# Patient Record
Sex: FEMALE | Race: WHITE | NOT HISPANIC OR LATINO | URBAN - METROPOLITAN AREA
[De-identification: names, ages, dates, MRNs, and addresses within clinical notes are randomized per-mention and may not be internally consistent; named-entity substitution may affect disease eponyms.]

---

## 2017-01-09 ENCOUNTER — OUTPATIENT (OUTPATIENT)
Dept: OUTPATIENT SERVICES | Facility: HOSPITAL | Age: 57
LOS: 1 days | End: 2017-01-09
Payer: COMMERCIAL

## 2017-01-09 ENCOUNTER — APPOINTMENT (OUTPATIENT)
Dept: CT IMAGING | Facility: IMAGING CENTER | Age: 57
End: 2017-01-09

## 2017-01-09 DIAGNOSIS — Z00.8 ENCOUNTER FOR OTHER GENERAL EXAMINATION: ICD-10-CM

## 2017-01-09 PROCEDURE — G0297: CPT

## 2018-02-14 ENCOUNTER — RESULT REVIEW (OUTPATIENT)
Age: 58
End: 2018-02-14

## 2018-03-05 ENCOUNTER — OUTPATIENT (OUTPATIENT)
Dept: OUTPATIENT SERVICES | Facility: HOSPITAL | Age: 58
LOS: 1 days | End: 2018-03-05
Payer: COMMERCIAL

## 2018-03-05 ENCOUNTER — APPOINTMENT (OUTPATIENT)
Dept: CT IMAGING | Facility: IMAGING CENTER | Age: 58
End: 2018-03-05
Payer: COMMERCIAL

## 2018-03-05 DIAGNOSIS — Z13.9 ENCOUNTER FOR SCREENING, UNSPECIFIED: ICD-10-CM

## 2018-03-05 PROCEDURE — G0297: CPT

## 2018-03-05 PROCEDURE — G0297: CPT | Mod: 26

## 2018-11-20 ENCOUNTER — APPOINTMENT (OUTPATIENT)
Dept: RADIOLOGY | Facility: IMAGING CENTER | Age: 58
End: 2018-11-20
Payer: COMMERCIAL

## 2018-11-20 ENCOUNTER — OUTPATIENT (OUTPATIENT)
Dept: OUTPATIENT SERVICES | Facility: HOSPITAL | Age: 58
LOS: 1 days | End: 2018-11-20
Payer: COMMERCIAL

## 2018-11-20 DIAGNOSIS — R05 COUGH: ICD-10-CM

## 2018-11-20 PROCEDURE — 71046 X-RAY EXAM CHEST 2 VIEWS: CPT | Mod: 26

## 2018-11-20 PROCEDURE — 71046 X-RAY EXAM CHEST 2 VIEWS: CPT

## 2019-03-22 ENCOUNTER — APPOINTMENT (OUTPATIENT)
Dept: CT IMAGING | Facility: IMAGING CENTER | Age: 59
End: 2019-03-22

## 2019-03-22 ENCOUNTER — OUTPATIENT (OUTPATIENT)
Dept: OUTPATIENT SERVICES | Facility: HOSPITAL | Age: 59
LOS: 1 days | End: 2019-03-22
Payer: COMMERCIAL

## 2019-03-22 DIAGNOSIS — Z00.8 ENCOUNTER FOR OTHER GENERAL EXAMINATION: ICD-10-CM

## 2019-03-22 PROCEDURE — G0297: CPT | Mod: 26

## 2019-03-22 PROCEDURE — G0297: CPT

## 2020-09-24 VITALS — BODY MASS INDEX: 19.49 KG/M2 | WEIGHT: 110 LBS | HEIGHT: 63 IN

## 2020-09-24 DIAGNOSIS — Z87.09 PERSONAL HISTORY OF OTHER DISEASES OF THE RESPIRATORY SYSTEM: ICD-10-CM

## 2020-09-24 DIAGNOSIS — Z12.2 ENCOUNTER FOR SCREENING FOR MALIGNANT NEOPLASM OF RESPIRATORY ORGANS: ICD-10-CM

## 2020-09-24 NOTE — ASSESSMENT
[Discussed Risks and Advised to Quit Smoking] : Discussed risks and advised to quit smoking [Discussed Cessation Strategies] : cessation strategies were discussed [Not Ready] : Patient is not ready for cessation intervention [Pre-contemplation] : Pre-contemplation: The patient is not thinking about quitting smoking in the next 6 months

## 2020-09-24 NOTE — HISTORY OF PRESENT ILLNESS
[Current] : current smoker [_____ pack-years] : [unfilled] pack-years [TextBox_13] : She denies hemoptysis, denies new cough, denies unexplained weight loss.  She is a current smoker with a 41 pack year smoking history (1PPD x 41 years). She denies family h/o lung cancer.  She is referred by Dr. Freedom Fallon.  This is a telephonic visit. \par \par

## 2020-09-24 NOTE — PLAN
[Smoking Cessation Guidance Provided] : Smoking cessation guidance was provided to patient [Smoking Cessation] : smoking cessation [Age appropriate screenings] : Age appropriate screenings [Regular Exercise] : regular exercise [Regular follow-up with healthcare provider] : regular follow-up with healthcare provider [FreeTextEntry1] : Her LDCT is scheduled for 9/29/20 at the [facility] location.  She will follow up with Dr. Fallon for the resutls.

## 2020-09-29 ENCOUNTER — APPOINTMENT (OUTPATIENT)
Dept: CT IMAGING | Facility: IMAGING CENTER | Age: 60
End: 2020-09-29
Payer: COMMERCIAL

## 2020-09-29 ENCOUNTER — OUTPATIENT (OUTPATIENT)
Dept: OUTPATIENT SERVICES | Facility: HOSPITAL | Age: 60
LOS: 1 days | End: 2020-09-29
Payer: COMMERCIAL

## 2020-09-29 DIAGNOSIS — Z00.8 ENCOUNTER FOR OTHER GENERAL EXAMINATION: ICD-10-CM

## 2020-09-29 PROCEDURE — G0297: CPT | Mod: 26

## 2020-09-29 PROCEDURE — G0297: CPT

## 2020-10-27 ENCOUNTER — APPOINTMENT (OUTPATIENT)
Dept: THORACIC SURGERY | Facility: CLINIC | Age: 60
End: 2020-10-27
Payer: COMMERCIAL

## 2020-10-27 VITALS
WEIGHT: 112 LBS | DIASTOLIC BLOOD PRESSURE: 78 MMHG | OXYGEN SATURATION: 97 % | BODY MASS INDEX: 19.84 KG/M2 | HEART RATE: 100 BPM | SYSTOLIC BLOOD PRESSURE: 118 MMHG | HEIGHT: 63 IN

## 2020-10-27 DIAGNOSIS — F17.200 NICOTINE DEPENDENCE, UNSPECIFIED, UNCOMPLICATED: ICD-10-CM

## 2020-10-27 DIAGNOSIS — Z82.5 FAMILY HISTORY OF ASTHMA AND OTHER CHRONIC LOWER RESPIRATORY DISEASES: ICD-10-CM

## 2020-10-27 DIAGNOSIS — Z78.9 OTHER SPECIFIED HEALTH STATUS: ICD-10-CM

## 2020-10-27 PROCEDURE — 99205 OFFICE O/P NEW HI 60 MIN: CPT

## 2020-10-27 PROCEDURE — 99072 ADDL SUPL MATRL&STAF TM PHE: CPT

## 2020-10-27 RX ORDER — ERGOCALCIFEROL (VITAMIN D2) 1250 MCG
50000 CAPSULE ORAL
Refills: 0 | Status: ACTIVE | COMMUNITY

## 2020-10-27 RX ORDER — FLUTICASONE FUROATE, UMECLIDINIUM BROMIDE AND VILANTEROL TRIFENATATE 100; 62.5; 25 UG/1; UG/1; UG/1
100-62.5-25 POWDER RESPIRATORY (INHALATION)
Refills: 0 | Status: ACTIVE | COMMUNITY

## 2020-10-27 RX ORDER — ALBUTEROL SULFATE
POWDER (GRAM) MISCELLANEOUS
Refills: 0 | Status: ACTIVE | COMMUNITY

## 2020-10-27 RX ORDER — MULTIVITAMIN/IRON/FOLIC ACID 18MG-0.4MG
TABLET ORAL
Refills: 0 | Status: ACTIVE | COMMUNITY

## 2020-10-28 NOTE — HISTORY OF PRESENT ILLNESS
[FreeTextEntry1] : Ms. AVIVA VALENCIA, 60 year old female, current smoker (1/2 PPD x 30 years; Quit 1 week ago), w/ hx of COPD, who presented to PCP for low dose CT lung cancer screening and was found to have new lung nodule during the LDCT this year. \par \par CT chest on 09/29/2020:\par - New 3 x 2 cm lobulated mass with surrounding tree-in-bud micronodules within the superior segment of LLL \par - Centrilobular and paraseptal emphysema \par \par Patient is here today for CT surgery consultation, referred by Dr. Freedom Fallon (Pulm). Today, patient admits to AM cough, expectorating small amount of clear sputum; Denies SOB, chest pain,hemoptysis, dyspnea upon exertion, fever, chills, night sweats, lightheadedness or dizziness.\par

## 2020-10-28 NOTE — PHYSICAL EXAM
[General Appearance - Alert] : alert [General Appearance - In No Acute Distress] : in no acute distress [Sclera] : the sclera and conjunctiva were normal [PERRL With Normal Accommodation] : pupils were equal in size, round, and reactive to light [Extraocular Movements] : extraocular movements were intact [Outer Ear] : the ears and nose were normal in appearance [Hearing Threshold Finger Rub Not Butler] : hearing was normal [Oropharynx] : the oropharynx was normal [Neck Appearance] : the appearance of the neck was normal [Neck Cervical Mass (___cm)] : no neck mass was observed [Jugular Venous Distention Increased] : there was no jugular-venous distention [Thyroid Diffuse Enlargement] : the thyroid was not enlarged [Thyroid Nodule] : there were no palpable thyroid nodules [Respiration, Rhythm And Depth] : normal respiratory rhythm and effort [Exaggerated Use Of Accessory Muscles For Inspiration] : no accessory muscle use [Auscultation Breath Sounds / Voice Sounds] : lungs were clear to auscultation bilaterally [Apical Impulse] : the apical impulse was normal [Heart Rate And Rhythm] : heart rate was normal and rhythm regular [Heart Sounds] : normal S1 and S2 [Examination Of The Chest] : the chest was normal in appearance [Chest Visual Inspection Thoracic Asymmetry] : no chest asymmetry [Diminished Respiratory Excursion] : normal chest expansion [2+] : left 2+ [No Pulse Delay] : no pulse delay [No Pulse Discrepancy] : no pulse discrepancy detected [Full Pulse] : peripheral pulses were full [Breast Appearance] : normal in appearance [Breast Palpation Mass] : no palpable masses [Bowel Sounds] : normal bowel sounds [Abdomen Soft] : soft [Abdomen Tenderness] : non-tender [Cervical Lymph Nodes Enlarged Posterior Bilaterally] : posterior cervical [Cervical Lymph Nodes Enlarged Anterior Bilaterally] : anterior cervical [Supraclavicular Lymph Nodes Enlarged Bilaterally] : supraclavicular [No CVA Tenderness] : no ~M costovertebral angle tenderness [No Spinal Tenderness] : no spinal tenderness [Abnormal Walk] : normal gait [Nail Clubbing] : no clubbing  or cyanosis of the fingernails [Musculoskeletal - Swelling] : no joint swelling seen [Motor Tone] : muscle strength and tone were normal [Skin Color & Pigmentation] : normal skin color and pigmentation [Skin Turgor] : normal skin turgor [] : no rash [Skin Lesions] : no skin lesions [Deep Tendon Reflexes (DTR)] : deep tendon reflexes were 2+ and symmetric [Sensation] : the sensory exam was normal to light touch and pinprick [Motor Exam] : the motor exam was normal [No Focal Deficits] : no focal deficits [Oriented To Time, Place, And Person] : oriented to person, place, and time [Impaired Insight] : insight and judgment were intact [Affect] : the affect was normal [Mood] : the mood was normal [Memory Recent] : recent memory was not impaired [Memory Remote] : remote memory was not impaired [Fingers] :  capillary refill of the fingers was normal [FreeTextEntry1] : Deferred

## 2020-10-28 NOTE — CONSULT LETTER
[Dear  ___] : Dear  [unfilled], [Consult Letter:] : I had the pleasure of evaluating your patient, [unfilled]. [( Thank you for referring [unfilled] for consultation for _____ )] : Thank you for referring [unfilled] for consultation for [unfilled] [Please see my note below.] : Please see my note below. [Consult Closing:] : Thank you very much for allowing me to participate in the care of this patient.  If you have any questions, please do not hesitate to contact me. [Sincerely,] : Sincerely, [FreeTextEntry2] : Dr. Freedom Fallon (Pulm/Ref) [FreeTextEntry3] : Arden Zuleta MD, FACS \par Chief, Division of Thoracic Surgery \par Director, Minimally Invasive Thoracic Surgery \par Department of Cardiovascular and Thoracic Surgery \par [facility] \par , Cardiovascular and Thoracic Surgery\par \par

## 2020-10-28 NOTE — ASSESSMENT
[FreeTextEntry1] : Ms. AVIVA VALENCIA, 60 year old female, current smoker (1/2 PPD x 30 years; Quit 1 week ago), w/ hx of COPD, who presented to PCP for low dose CT lung cancer screening and was found to have new lung nodule during the LDCT this year. \par \par CT chest on 09/29/2020:\par - New 3 x 2 cm lobulated mass with surrounding tree-in-bud micronodules within the superior segment of LLL \par - Centrilobular and paraseptal emphysema \par \par I have reviewed CT chest with patient and have recommended that the patient undergo a Lt VATS, lung resection. Risks and benefits and alternatives explained to patient, all questions answered, patient agreed to proceed with surgery. \par 2. Prior to surgery, I have instructed patient to undergo a PET/CT to evaluate for the presence of metabolically active disease; A Brain MRI with/without contrast to evaluate for the presence of metastasis; Pulmonary function testing; and medical clearance. \par 3. Patient meeting criteria for Ethicon study. Research coordinator present to discuss risks/benefits and patient verbalizes understanding. Patient agreeing to participate.ASA 3. \par \par \par I personally performed the services described in the documentation, reviewed the documentation recorded by the scribe in my presence and it accurately and completely records my words and actions.\par \par I, Odalys Zimmerman RN, am scribing for and the presence of EDDIE LehmanIM, the following sections HISTORY OF PRESENT ILLNESS, PAST MEDICAL/FAMILY/SOCIAL HISTORY; REVIEW OF SYSTEMS; VITAL SIGNS; PHYSICAL EXAM; DISPOSITION.\par \par

## 2020-10-28 NOTE — DATA REVIEWED
[FreeTextEntry1] : CT chest on 09/29/2020:\par - New 3 x 2 cm lobulated mass with surrounding tree-in-bud micronodules within the superior segment of LLL \par - Centrilobular and paraseptal emphysema \par \par

## 2020-11-10 ENCOUNTER — APPOINTMENT (OUTPATIENT)
Dept: MRI IMAGING | Facility: CLINIC | Age: 60
End: 2020-11-10
Payer: COMMERCIAL

## 2020-11-10 ENCOUNTER — OUTPATIENT (OUTPATIENT)
Dept: OUTPATIENT SERVICES | Facility: HOSPITAL | Age: 60
LOS: 1 days | End: 2020-11-10
Payer: COMMERCIAL

## 2020-11-10 DIAGNOSIS — R91.8 OTHER NONSPECIFIC ABNORMAL FINDING OF LUNG FIELD: ICD-10-CM

## 2020-11-10 DIAGNOSIS — Z00.8 ENCOUNTER FOR OTHER GENERAL EXAMINATION: ICD-10-CM

## 2020-11-10 PROCEDURE — 70553 MRI BRAIN STEM W/O & W/DYE: CPT

## 2020-11-10 PROCEDURE — 70553 MRI BRAIN STEM W/O & W/DYE: CPT | Mod: 26

## 2020-11-10 PROCEDURE — A9585: CPT

## 2020-11-12 ENCOUNTER — APPOINTMENT (OUTPATIENT)
Dept: NUCLEAR MEDICINE | Facility: IMAGING CENTER | Age: 60
End: 2020-11-12
Payer: COMMERCIAL

## 2020-11-12 ENCOUNTER — OUTPATIENT (OUTPATIENT)
Dept: OUTPATIENT SERVICES | Facility: HOSPITAL | Age: 60
LOS: 1 days | End: 2020-11-12
Payer: COMMERCIAL

## 2020-11-12 DIAGNOSIS — R91.8 OTHER NONSPECIFIC ABNORMAL FINDING OF LUNG FIELD: ICD-10-CM

## 2020-11-12 DIAGNOSIS — Z00.8 ENCOUNTER FOR OTHER GENERAL EXAMINATION: ICD-10-CM

## 2020-11-12 PROCEDURE — 78815 PET IMAGE W/CT SKULL-THIGH: CPT | Mod: 26,PI

## 2020-11-12 PROCEDURE — A9552: CPT

## 2020-11-12 PROCEDURE — 78815 PET IMAGE W/CT SKULL-THIGH: CPT

## 2020-11-13 ENCOUNTER — OUTPATIENT (OUTPATIENT)
Dept: OUTPATIENT SERVICES | Facility: HOSPITAL | Age: 60
LOS: 1 days | End: 2020-11-13

## 2020-11-13 VITALS
RESPIRATION RATE: 20 BRPM | WEIGHT: 113.1 LBS | TEMPERATURE: 98 F | SYSTOLIC BLOOD PRESSURE: 120 MMHG | DIASTOLIC BLOOD PRESSURE: 70 MMHG | OXYGEN SATURATION: 99 % | HEIGHT: 62 IN | HEART RATE: 96 BPM

## 2020-11-13 DIAGNOSIS — R91.8 OTHER NONSPECIFIC ABNORMAL FINDING OF LUNG FIELD: ICD-10-CM

## 2020-11-13 DIAGNOSIS — J44.9 CHRONIC OBSTRUCTIVE PULMONARY DISEASE, UNSPECIFIED: ICD-10-CM

## 2020-11-13 DIAGNOSIS — Z98.890 OTHER SPECIFIED POSTPROCEDURAL STATES: Chronic | ICD-10-CM

## 2020-11-13 DIAGNOSIS — Z90.89 ACQUIRED ABSENCE OF OTHER ORGANS: Chronic | ICD-10-CM

## 2020-11-13 LAB
ANION GAP SERPL CALC-SCNC: 11 MMO/L — SIGNIFICANT CHANGE UP (ref 7–14)
BLD GP AB SCN SERPL QL: NEGATIVE — SIGNIFICANT CHANGE UP
BUN SERPL-MCNC: 10 MG/DL — SIGNIFICANT CHANGE UP (ref 7–23)
CALCIUM SERPL-MCNC: 9.5 MG/DL — SIGNIFICANT CHANGE UP (ref 8.4–10.5)
CHLORIDE SERPL-SCNC: 101 MMOL/L — SIGNIFICANT CHANGE UP (ref 98–107)
CO2 SERPL-SCNC: 27 MMOL/L — SIGNIFICANT CHANGE UP (ref 22–31)
CREAT SERPL-MCNC: 0.58 MG/DL — SIGNIFICANT CHANGE UP (ref 0.5–1.3)
GLUCOSE SERPL-MCNC: 97 MG/DL — SIGNIFICANT CHANGE UP (ref 70–99)
POTASSIUM SERPL-MCNC: 3.6 MMOL/L — SIGNIFICANT CHANGE UP (ref 3.5–5.3)
POTASSIUM SERPL-SCNC: 3.6 MMOL/L — SIGNIFICANT CHANGE UP (ref 3.5–5.3)
RH IG SCN BLD-IMP: POSITIVE — SIGNIFICANT CHANGE UP
SODIUM SERPL-SCNC: 139 MMOL/L — SIGNIFICANT CHANGE UP (ref 135–145)

## 2020-11-13 RX ORDER — SODIUM CHLORIDE 9 MG/ML
1000 INJECTION, SOLUTION INTRAVENOUS
Refills: 0 | Status: DISCONTINUED | OUTPATIENT
Start: 2020-11-20 | End: 2020-11-22

## 2020-11-13 NOTE — H&P PST ADULT - NEGATIVE ENMT SYMPTOMS
no ear pain/no hearing difficulty/no vertigo/no nasal congestion/no sinus symptoms/no tinnitus/no throat pain/no dysphagia

## 2020-11-13 NOTE — H&P PST ADULT - RS GEN PE MLT RESP DETAILS PC
breath sounds equal/respirations non-labored/clear to auscultation bilaterally/airway patent/no wheezes/good air movement

## 2020-11-13 NOTE — H&P PST ADULT - NSICDXPROBLEM_GEN_ALL_CORE_FT
PROBLEM DIAGNOSES  Problem: Other nonspecific abnormal finding of lung field  Assessment and Plan: Scheduled for flexible bronchoscopy, left video assisted thoracoscopic surgery, lung resection with Dr. Zuleta.   Verbal and written pre-op instructions provided to patient. Patient verbalized understanding and will call surgeons office for revised instructions if surgery is rescheduled.   Pepcid for GI prophylaxis provided.   patient given verbal and written instruction with teach back on chlorhexidine shampoo, and the patient verbalized understanding with return demonstration.   Patient aware of need for COVID testing prior to  procedure and advised to coordinate with surgeon.   Pending Cardiac eval as per surgeon request-copy requested.   Patient will obtain medical clearance as per surgeons request-copy requested.       Problem: COPD (chronic obstructive pulmonary disease)  Assessment and Plan: Pt. instructed to continue medications as prescribed.

## 2020-11-13 NOTE — H&P PST ADULT - ASSESSMENT
59 yo 30-pack year smoker with history of COPD presents to PST unit with other nonspecific abnormal finding of lung field scheduled for flexible bronchoscopy, left video assisted thoracoscopic surgery, lung resection with Dr. Zuleta.

## 2020-11-13 NOTE — H&P PST ADULT - HISTORY OF PRESENT ILLNESS
61 yo 30-pack year smoker with history of COPD presents to PST unit with other nonspecific abnormal finding of lung field scheduled for flexible bronchoscopy, left video assisted thoracoscopic surgery, lung resection with Dr. Zuleta. She reports abnormal ling finding on annual CT scan done as surveillance due to her smoking history.

## 2020-11-13 NOTE — H&P PST ADULT - NSICDXPASTMEDICALHX_GEN_ALL_CORE_FT
PAST MEDICAL HISTORY:  History of COPD     Osteoporosis     Other nonspecific abnormal finding of lung field

## 2020-11-13 NOTE — H&P PST ADULT - NSANTHOSAYNRD_GEN_A_CORE
No. PANCHO screening performed.  STOP BANG Legend: 0-2 = LOW Risk; 3-4 = INTERMEDIATE Risk; 5-8 = HIGH Risk

## 2020-11-16 DIAGNOSIS — Z01.818 ENCOUNTER FOR OTHER PREPROCEDURAL EXAMINATION: ICD-10-CM

## 2020-11-17 ENCOUNTER — APPOINTMENT (OUTPATIENT)
Dept: DISASTER EMERGENCY | Facility: CLINIC | Age: 60
End: 2020-11-17

## 2020-11-18 LAB — SARS-COV-2 N GENE NPH QL NAA+PROBE: NOT DETECTED

## 2020-11-19 ENCOUNTER — TRANSCRIPTION ENCOUNTER (OUTPATIENT)
Age: 60
End: 2020-11-19

## 2020-11-20 ENCOUNTER — INPATIENT (INPATIENT)
Facility: HOSPITAL | Age: 60
LOS: 2 days | Discharge: HOME | End: 2020-11-23
Attending: THORACIC SURGERY (CARDIOTHORACIC VASCULAR SURGERY) | Admitting: THORACIC SURGERY (CARDIOTHORACIC VASCULAR SURGERY)
Payer: COMMERCIAL

## 2020-11-20 ENCOUNTER — RESULT REVIEW (OUTPATIENT)
Age: 60
End: 2020-11-20

## 2020-11-20 ENCOUNTER — APPOINTMENT (OUTPATIENT)
Dept: THORACIC SURGERY | Facility: HOSPITAL | Age: 60
End: 2020-11-20

## 2020-11-20 VITALS
HEART RATE: 90 BPM | WEIGHT: 113.1 LBS | TEMPERATURE: 98 F | DIASTOLIC BLOOD PRESSURE: 57 MMHG | HEIGHT: 62 IN | OXYGEN SATURATION: 100 % | SYSTOLIC BLOOD PRESSURE: 88 MMHG | RESPIRATION RATE: 17 BRPM

## 2020-11-20 DIAGNOSIS — Z90.89 ACQUIRED ABSENCE OF OTHER ORGANS: Chronic | ICD-10-CM

## 2020-11-20 DIAGNOSIS — Z98.890 OTHER SPECIFIED POSTPROCEDURAL STATES: Chronic | ICD-10-CM

## 2020-11-20 DIAGNOSIS — R91.8 OTHER NONSPECIFIC ABNORMAL FINDING OF LUNG FIELD: ICD-10-CM

## 2020-11-20 LAB — RH IG SCN BLD-IMP: POSITIVE — SIGNIFICANT CHANGE UP

## 2020-11-20 PROCEDURE — 88305 TISSUE EXAM BY PATHOLOGIST: CPT | Mod: 26

## 2020-11-20 PROCEDURE — 88341 IMHCHEM/IMCYTCHM EA ADD ANTB: CPT | Mod: 26

## 2020-11-20 PROCEDURE — 88313 SPECIAL STAINS GROUP 2: CPT | Mod: 26

## 2020-11-20 PROCEDURE — 88342 IMHCHEM/IMCYTCHM 1ST ANTB: CPT | Mod: 26

## 2020-11-20 PROCEDURE — 88331 PATH CONSLTJ SURG 1 BLK 1SPC: CPT | Mod: 26

## 2020-11-20 PROCEDURE — 99233 SBSQ HOSP IP/OBS HIGH 50: CPT

## 2020-11-20 PROCEDURE — 71045 X-RAY EXAM CHEST 1 VIEW: CPT | Mod: 26,77

## 2020-11-20 PROCEDURE — 88307 TISSUE EXAM BY PATHOLOGIST: CPT | Mod: 26

## 2020-11-20 PROCEDURE — 88309 TISSUE EXAM BY PATHOLOGIST: CPT | Mod: 26

## 2020-11-20 PROCEDURE — 32663 THORACOSCOPY W/LOBECTOMY: CPT | Mod: LT

## 2020-11-20 PROCEDURE — 88334 PATH CONSLTJ SURG CYTO XM EA: CPT | Mod: 26,59

## 2020-11-20 PROCEDURE — 32674 THORACOSCOPY LYMPH NODE EXC: CPT

## 2020-11-20 PROCEDURE — 71045 X-RAY EXAM CHEST 1 VIEW: CPT | Mod: 26

## 2020-11-20 RX ORDER — HYDROMORPHONE HYDROCHLORIDE 2 MG/ML
30 INJECTION INTRAMUSCULAR; INTRAVENOUS; SUBCUTANEOUS
Refills: 0 | Status: DISCONTINUED | OUTPATIENT
Start: 2020-11-20 | End: 2020-11-21

## 2020-11-20 RX ORDER — SENNA PLUS 8.6 MG/1
2 TABLET ORAL AT BEDTIME
Refills: 0 | Status: DISCONTINUED | OUTPATIENT
Start: 2020-11-20 | End: 2020-11-23

## 2020-11-20 RX ORDER — ACETAMINOPHEN 500 MG
975 TABLET ORAL ONCE
Refills: 0 | Status: COMPLETED | OUTPATIENT
Start: 2020-11-20 | End: 2020-11-20

## 2020-11-20 RX ORDER — ALBUTEROL 90 UG/1
0 AEROSOL, METERED ORAL
Qty: 0 | Refills: 0 | DISCHARGE

## 2020-11-20 RX ORDER — HEPARIN SODIUM 5000 [USP'U]/ML
5000 INJECTION INTRAVENOUS; SUBCUTANEOUS ONCE
Refills: 0 | Status: COMPLETED | OUTPATIENT
Start: 2020-11-20 | End: 2020-11-20

## 2020-11-20 RX ORDER — NICOTINE POLACRILEX 2 MG
0 GUM BUCCAL
Qty: 0 | Refills: 0 | DISCHARGE

## 2020-11-20 RX ORDER — GABAPENTIN 400 MG/1
300 CAPSULE ORAL ONCE
Refills: 0 | Status: COMPLETED | OUTPATIENT
Start: 2020-11-20 | End: 2020-11-20

## 2020-11-20 RX ORDER — ONDANSETRON 8 MG/1
4 TABLET, FILM COATED ORAL EVERY 6 HOURS
Refills: 0 | Status: DISCONTINUED | OUTPATIENT
Start: 2020-11-20 | End: 2020-11-21

## 2020-11-20 RX ORDER — ALBUTEROL 90 UG/1
1 AEROSOL, METERED ORAL EVERY 4 HOURS
Refills: 0 | Status: DISCONTINUED | OUTPATIENT
Start: 2020-11-20 | End: 2020-11-20

## 2020-11-20 RX ORDER — HEPARIN SODIUM 5000 [USP'U]/ML
5000 INJECTION INTRAVENOUS; SUBCUTANEOUS EVERY 8 HOURS
Refills: 0 | Status: DISCONTINUED | OUTPATIENT
Start: 2020-11-20 | End: 2020-11-23

## 2020-11-20 RX ORDER — CHOLECALCIFEROL (VITAMIN D3) 125 MCG
0 CAPSULE ORAL
Qty: 0 | Refills: 0 | DISCHARGE

## 2020-11-20 RX ORDER — MULTIVIT-MIN/FERROUS GLUCONATE 9 MG/15 ML
1 LIQUID (ML) ORAL
Qty: 0 | Refills: 0 | DISCHARGE

## 2020-11-20 RX ORDER — ALBUTEROL 90 UG/1
2.5 AEROSOL, METERED ORAL EVERY 6 HOURS
Refills: 0 | Status: DISCONTINUED | OUTPATIENT
Start: 2020-11-20 | End: 2020-11-21

## 2020-11-20 RX ORDER — NALOXONE HYDROCHLORIDE 4 MG/.1ML
0.1 SPRAY NASAL
Refills: 0 | Status: DISCONTINUED | OUTPATIENT
Start: 2020-11-20 | End: 2020-11-21

## 2020-11-20 RX ORDER — ALBUTEROL 90 UG/1
4 AEROSOL, METERED ORAL EVERY 6 HOURS
Refills: 0 | Status: DISCONTINUED | OUTPATIENT
Start: 2020-11-20 | End: 2020-11-21

## 2020-11-20 RX ORDER — SODIUM CHLORIDE 9 MG/ML
250 INJECTION, SOLUTION INTRAVENOUS ONCE
Refills: 0 | Status: COMPLETED | OUTPATIENT
Start: 2020-11-20 | End: 2020-11-20

## 2020-11-20 RX ORDER — HYDROMORPHONE HYDROCHLORIDE 2 MG/ML
0.5 INJECTION INTRAMUSCULAR; INTRAVENOUS; SUBCUTANEOUS
Refills: 0 | Status: DISCONTINUED | OUTPATIENT
Start: 2020-11-20 | End: 2020-11-21

## 2020-11-20 RX ADMIN — HYDROMORPHONE HYDROCHLORIDE 30 MILLILITER(S): 2 INJECTION INTRAMUSCULAR; INTRAVENOUS; SUBCUTANEOUS at 10:36

## 2020-11-20 RX ADMIN — Medication 975 MILLIGRAM(S): at 07:34

## 2020-11-20 RX ADMIN — SODIUM CHLORIDE 1000 MILLILITER(S): 9 INJECTION, SOLUTION INTRAVENOUS at 23:00

## 2020-11-20 RX ADMIN — SODIUM CHLORIDE 30 MILLILITER(S): 9 INJECTION, SOLUTION INTRAVENOUS at 10:35

## 2020-11-20 RX ADMIN — Medication 5 MILLIGRAM(S): at 17:46

## 2020-11-20 RX ADMIN — Medication 975 MILLIGRAM(S): at 07:38

## 2020-11-20 RX ADMIN — HEPARIN SODIUM 5000 UNIT(S): 5000 INJECTION INTRAVENOUS; SUBCUTANEOUS at 07:33

## 2020-11-20 RX ADMIN — HYDROMORPHONE HYDROCHLORIDE 0.5 MILLIGRAM(S): 2 INJECTION INTRAMUSCULAR; INTRAVENOUS; SUBCUTANEOUS at 21:48

## 2020-11-20 RX ADMIN — GABAPENTIN 300 MILLIGRAM(S): 400 CAPSULE ORAL at 07:33

## 2020-11-20 RX ADMIN — HEPARIN SODIUM 5000 UNIT(S): 5000 INJECTION INTRAVENOUS; SUBCUTANEOUS at 21:36

## 2020-11-20 RX ADMIN — HEPARIN SODIUM 5000 UNIT(S): 5000 INJECTION INTRAVENOUS; SUBCUTANEOUS at 16:07

## 2020-11-20 NOTE — BRIEF OPERATIVE NOTE - OPERATION/FINDINGS
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  Uniportal left lower lobectomy, left upper lobe wedge and mediastinal lymph node dissection.

## 2020-11-20 NOTE — PROGRESS NOTE ADULT - SUBJECTIVE AND OBJECTIVE BOX
AVIVA VALENCIA                     [id number]    HPI:  59 yo 30-pack year smoker with history of COPD presents to PST unit with other nonspecific abnormal finding of lung field scheduled for flexible bronchoscopy, left video assisted thoracoscopic surgery, lung resection with Dr. Zuleta. She reports abnormal ling finding on annual CT scan done as surveillance due to her smoking history.  (13 Nov 2020 14:23)      Procedure:   Thoracoscopic lobectomy of left lung 20-Nov-2020 Uniportal left lower lobectomy   Flexible bronchoscopy       Issues:  Lung nodule  Post op pain  COPD        PAST MEDICAL & SURGICAL HISTORY:  Other nonspecific abnormal finding of lung field    History of COPD    Osteoporosis    History of tonsillectomy    H/O ovarian cystectomy              VITAL SIGNS:  Vital Signs Last 24 Hrs  T(C): 36.4 (20 Nov 2020 16:00), Max: 36.7 (20 Nov 2020 07:18)  T(F): 97.6 (20 Nov 2020 16:00), Max: 98.1 (20 Nov 2020 12:00)  HR: 99 (20 Nov 2020 16:00) (82 - 100)  BP: 100/54 (20 Nov 2020 16:00) (88/57 - 110/56)  BP(mean): 65 (20 Nov 2020 16:00) (61 - 86)  RR: 20 (20 Nov 2020 16:00) (13 - 20)  SpO2: 94% (20 Nov 2020 16:00) (94% - 100%)    I/Os:   I&O's Detail    20 Nov 2020 07:01  -  20 Nov 2020 16:13  --------------------------------------------------------  IN:    Lactated Ringers: 210 mL    Oral Fluid: 240 mL  Total IN: 450 mL    OUT:    Chest Tube (mL): 35 mL  Total OUT: 35 mL    Total NET: 415 mL          CAPILLARY BLOOD GLUCOSE          =======================MEDICATIONS===================  MEDICATIONS  (STANDING):  busPIRone 5 milliGRAM(s) Oral every 12 hours  heparin   Injectable 5000 Unit(s) SubCutaneous every 8 hours  HYDROmorphone PCA (1 mG/mL) 30 milliLiter(s) PCA Continuous PCA Continuous  lactated ringers. 1000 milliLiter(s) (30 mL/Hr) IV Continuous <Continuous>    MEDICATIONS  (PRN):  ALBUTerol    0.083% 2.5 milliGRAM(s) Nebulizer every 6 hours PRN Shortness of Breath and/or Wheezing  ALBUTerol    90 MICROgram(s) HFA Inhaler 4 Puff(s) Inhalation every 6 hours PRN Shortness of Breath and/or Wheezing  HYDROmorphone PCA (1 mG/mL) Rescue Clinician Bolus 0.5 milliGRAM(s) IV Push every 15 minutes PRN for Pain Scale GREATER THAN 6  naloxone Injectable 0.1 milliGRAM(s) IV Push every 3 minutes PRN For ANY of the following changes in patient status:  A. RR LESS THAN 10 breaths per minute, B. Oxygen saturation LESS THAN 90%, C. Sedation score of 6  ondansetron Injectable 4 milliGRAM(s) IV Push every 6 hours PRN Nausea  senna 2 Tablet(s) Oral at bedtime PRN Constipation    PHYSICAL EXAM============================  General:                         Awake, alert, not in any distress  Neuro:                            Moving all extremities to commands.   Respiratory:	Air entry fair and  bilateral conducted sounds                                           Effort even and unlabored.  CV:		Regular rate and rhythm. Normal S1/S2                                          Distal pulses present.  Abdomen:	                     Soft, non-distended. Bowel sounds present   Skin:		No rash.  Extremities:	Warm, no cyanosis or edema.  Palpable pulses      =============================NEUROLOGY============================  Pain control with PCA  / Tylenol IV     ==============================RESPIRATORY========================  Pt is on  2    L nasal canula   Comfortable, not in any distress.  Using incentive spirometry &  Monitor chest tube output  Chest tube to suction 	  Continue bronchodilators, pulmonary toilet    ============================CARDIOVASCULAR======================  Continue hemodynamic monitoring.  Not on any pressors    =====================RENAL===================  Continue LR 30CC/hr    Monitor I/Os and electrolytes    ====================GASTROINTESTINAL===================  On clears, tolerating  Continue GI prophylaxis with Pepcid   Continue Zofran / Reglan for nausea - PRN	    ========================HEMATOLOGIC/ONCOLOGIC====================  Monitor chest tube output. No signs of active bleeding.   Follow CBC in AM    ============================INFECTIOUS DISEASE========================  Monitor for fever / leukocytosis.  All surgical incision / chest tube  sites look clean      Pt is on GI & DVT prophylaxis  OOB & ambulate       Pertinent clinical, laboratory, radiographic, hemodynamic, echocardiographic, respiratory data, microbiologic data and chart were reviewed and analyzed frequently throughout the course of the day and night  Patient seen, examined and plan discussed with CT Surgery / CTICU team during rounds.    Pt's status discussed with family at bedside, updated status        Charles Viramontes DO FACEP

## 2020-11-21 LAB
ALBUMIN SERPL ELPH-MCNC: 3.3 G/DL — SIGNIFICANT CHANGE UP (ref 3.3–5)
ALP SERPL-CCNC: 66 U/L — SIGNIFICANT CHANGE UP (ref 40–120)
ALT FLD-CCNC: 15 U/L — SIGNIFICANT CHANGE UP (ref 4–33)
ANION GAP SERPL CALC-SCNC: 11 MMO/L — SIGNIFICANT CHANGE UP (ref 7–14)
ANION GAP SERPL CALC-SCNC: 12 MMO/L — SIGNIFICANT CHANGE UP (ref 7–14)
AST SERPL-CCNC: 39 U/L — HIGH (ref 4–32)
BASOPHILS # BLD AUTO: 0.04 K/UL — SIGNIFICANT CHANGE UP (ref 0–0.2)
BASOPHILS NFR BLD AUTO: 0.2 % — SIGNIFICANT CHANGE UP (ref 0–2)
BASOPHILS NFR SPEC: 0.9 % — SIGNIFICANT CHANGE UP (ref 0–2)
BILIRUB DIRECT SERPL-MCNC: < 0.2 MG/DL — SIGNIFICANT CHANGE UP (ref 0.1–0.2)
BILIRUB SERPL-MCNC: 0.2 MG/DL — SIGNIFICANT CHANGE UP (ref 0.2–1.2)
BUN SERPL-MCNC: 10 MG/DL — SIGNIFICANT CHANGE UP (ref 7–23)
BUN SERPL-MCNC: 14 MG/DL — SIGNIFICANT CHANGE UP (ref 7–23)
CALCIUM SERPL-MCNC: 9.3 MG/DL — SIGNIFICANT CHANGE UP (ref 8.4–10.5)
CALCIUM SERPL-MCNC: 9.5 MG/DL — SIGNIFICANT CHANGE UP (ref 8.4–10.5)
CHLORIDE SERPL-SCNC: 92 MMOL/L — LOW (ref 98–107)
CHLORIDE SERPL-SCNC: 96 MMOL/L — LOW (ref 98–107)
CO2 SERPL-SCNC: 23 MMOL/L — SIGNIFICANT CHANGE UP (ref 22–31)
CO2 SERPL-SCNC: 23 MMOL/L — SIGNIFICANT CHANGE UP (ref 22–31)
CREAT SERPL-MCNC: 0.58 MG/DL — SIGNIFICANT CHANGE UP (ref 0.5–1.3)
CREAT SERPL-MCNC: 0.81 MG/DL — SIGNIFICANT CHANGE UP (ref 0.5–1.3)
EOSINOPHIL # BLD AUTO: 0 K/UL — SIGNIFICANT CHANGE UP (ref 0–0.5)
EOSINOPHIL NFR BLD AUTO: 0 % — SIGNIFICANT CHANGE UP (ref 0–6)
EOSINOPHIL NFR FLD: 0 % — SIGNIFICANT CHANGE UP (ref 0–6)
GLUCOSE SERPL-MCNC: 151 MG/DL — HIGH (ref 70–99)
GLUCOSE SERPL-MCNC: 154 MG/DL — HIGH (ref 70–99)
HCT VFR BLD CALC: 37.5 % — SIGNIFICANT CHANGE UP (ref 34.5–45)
HCT VFR BLD CALC: 37.7 % — SIGNIFICANT CHANGE UP (ref 34.5–45)
HGB BLD-MCNC: 11.8 G/DL — SIGNIFICANT CHANGE UP (ref 11.5–15.5)
HGB BLD-MCNC: 12 G/DL — SIGNIFICANT CHANGE UP (ref 11.5–15.5)
IMM GRANULOCYTES NFR BLD AUTO: 0.4 % — SIGNIFICANT CHANGE UP (ref 0–1.5)
LYMPHOCYTES # BLD AUTO: 1.27 K/UL — SIGNIFICANT CHANGE UP (ref 1–3.3)
LYMPHOCYTES # BLD AUTO: 6.9 % — LOW (ref 13–44)
LYMPHOCYTES NFR SPEC AUTO: 7.8 % — LOW (ref 13–44)
MAGNESIUM SERPL-MCNC: 1.9 MG/DL — SIGNIFICANT CHANGE UP (ref 1.6–2.6)
MAGNESIUM SERPL-MCNC: 2.7 MG/DL — HIGH (ref 1.6–2.6)
MANUAL SMEAR VERIFICATION: SIGNIFICANT CHANGE UP
MCHC RBC-ENTMCNC: 30.6 PG — SIGNIFICANT CHANGE UP (ref 27–34)
MCHC RBC-ENTMCNC: 30.7 PG — SIGNIFICANT CHANGE UP (ref 27–34)
MCHC RBC-ENTMCNC: 31.3 % — LOW (ref 32–36)
MCHC RBC-ENTMCNC: 32 % — SIGNIFICANT CHANGE UP (ref 32–36)
MCV RBC AUTO: 95.9 FL — SIGNIFICANT CHANGE UP (ref 80–100)
MCV RBC AUTO: 97.7 FL — SIGNIFICANT CHANGE UP (ref 80–100)
MONOCYTES # BLD AUTO: 1.85 K/UL — HIGH (ref 0–0.9)
MONOCYTES NFR BLD AUTO: 10 % — SIGNIFICANT CHANGE UP (ref 2–14)
MONOCYTES NFR BLD: 3.5 % — SIGNIFICANT CHANGE UP (ref 2–9)
MORPHOLOGY BLD-IMP: NORMAL — SIGNIFICANT CHANGE UP
NEUTROPHIL AB SER-ACNC: 86.1 % — HIGH (ref 43–77)
NEUTROPHILS # BLD AUTO: 15.17 K/UL — HIGH (ref 1.8–7.4)
NEUTROPHILS NFR BLD AUTO: 82.5 % — HIGH (ref 43–77)
NRBC # FLD: 0 K/UL — SIGNIFICANT CHANGE UP (ref 0–0)
NRBC # FLD: 0 K/UL — SIGNIFICANT CHANGE UP (ref 0–0)
PLATELET # BLD AUTO: 398 K/UL — SIGNIFICANT CHANGE UP (ref 150–400)
PLATELET # BLD AUTO: 424 K/UL — HIGH (ref 150–400)
PLATELET COUNT - ESTIMATE: NORMAL — SIGNIFICANT CHANGE UP
PMV BLD: 8.7 FL — SIGNIFICANT CHANGE UP (ref 7–13)
PMV BLD: 8.8 FL — SIGNIFICANT CHANGE UP (ref 7–13)
POTASSIUM SERPL-MCNC: 4.6 MMOL/L — SIGNIFICANT CHANGE UP (ref 3.5–5.3)
POTASSIUM SERPL-MCNC: 4.9 MMOL/L — SIGNIFICANT CHANGE UP (ref 3.5–5.3)
POTASSIUM SERPL-SCNC: 4.6 MMOL/L — SIGNIFICANT CHANGE UP (ref 3.5–5.3)
POTASSIUM SERPL-SCNC: 4.9 MMOL/L — SIGNIFICANT CHANGE UP (ref 3.5–5.3)
PROT SERPL-MCNC: 6.6 G/DL — SIGNIFICANT CHANGE UP (ref 6–8.3)
RBC # BLD: 3.86 M/UL — SIGNIFICANT CHANGE UP (ref 3.8–5.2)
RBC # BLD: 3.91 M/UL — SIGNIFICANT CHANGE UP (ref 3.8–5.2)
RBC # FLD: 12.1 % — SIGNIFICANT CHANGE UP (ref 10.3–14.5)
RBC # FLD: 12.4 % — SIGNIFICANT CHANGE UP (ref 10.3–14.5)
SODIUM SERPL-SCNC: 127 MMOL/L — LOW (ref 135–145)
SODIUM SERPL-SCNC: 130 MMOL/L — LOW (ref 135–145)
VARIANT LYMPHS # BLD: 1.7 % — SIGNIFICANT CHANGE UP
WBC # BLD: 16.13 K/UL — HIGH (ref 3.8–10.5)
WBC # BLD: 18.41 K/UL — HIGH (ref 3.8–10.5)
WBC # FLD AUTO: 16.13 K/UL — HIGH (ref 3.8–10.5)
WBC # FLD AUTO: 18.41 K/UL — HIGH (ref 3.8–10.5)

## 2020-11-21 PROCEDURE — 71045 X-RAY EXAM CHEST 1 VIEW: CPT | Mod: 26

## 2020-11-21 PROCEDURE — 99292 CRITICAL CARE ADDL 30 MIN: CPT

## 2020-11-21 PROCEDURE — 99291 CRITICAL CARE FIRST HOUR: CPT

## 2020-11-21 RX ORDER — ACETAMINOPHEN 500 MG
1000 TABLET ORAL ONCE
Refills: 0 | Status: DISCONTINUED | OUTPATIENT
Start: 2020-11-21 | End: 2020-11-23

## 2020-11-21 RX ORDER — SODIUM CHLORIDE 9 MG/ML
250 INJECTION INTRAMUSCULAR; INTRAVENOUS; SUBCUTANEOUS ONCE
Refills: 0 | Status: DISCONTINUED | OUTPATIENT
Start: 2020-11-21 | End: 2020-11-22

## 2020-11-21 RX ORDER — NICOTINE POLACRILEX 2 MG
1 GUM BUCCAL DAILY
Refills: 0 | Status: DISCONTINUED | OUTPATIENT
Start: 2020-11-21 | End: 2020-11-23

## 2020-11-21 RX ORDER — METOPROLOL TARTRATE 50 MG
12.5 TABLET ORAL ONCE
Refills: 0 | Status: COMPLETED | OUTPATIENT
Start: 2020-11-21 | End: 2020-11-21

## 2020-11-21 RX ORDER — ACETAMINOPHEN 500 MG
1000 TABLET ORAL ONCE
Refills: 0 | Status: COMPLETED | OUTPATIENT
Start: 2020-11-21 | End: 2020-11-21

## 2020-11-21 RX ORDER — BUDESONIDE AND FORMOTEROL FUMARATE DIHYDRATE 160; 4.5 UG/1; UG/1
2 AEROSOL RESPIRATORY (INHALATION)
Refills: 0 | Status: DISCONTINUED | OUTPATIENT
Start: 2020-11-21 | End: 2020-11-22

## 2020-11-21 RX ORDER — PHENYLEPHRINE HYDROCHLORIDE 10 MG/ML
0.5 INJECTION INTRAVENOUS
Qty: 40 | Refills: 0 | Status: DISCONTINUED | OUTPATIENT
Start: 2020-11-21 | End: 2020-11-22

## 2020-11-21 RX ORDER — METOPROLOL TARTRATE 50 MG
12.5 TABLET ORAL EVERY 12 HOURS
Refills: 0 | Status: DISCONTINUED | OUTPATIENT
Start: 2020-11-21 | End: 2020-11-21

## 2020-11-21 RX ORDER — SODIUM CHLORIDE 9 MG/ML
750 INJECTION, SOLUTION INTRAVENOUS ONCE
Refills: 0 | Status: COMPLETED | OUTPATIENT
Start: 2020-11-21 | End: 2020-11-21

## 2020-11-21 RX ORDER — ONDANSETRON 8 MG/1
4 TABLET, FILM COATED ORAL EVERY 8 HOURS
Refills: 0 | Status: DISCONTINUED | OUTPATIENT
Start: 2020-11-21 | End: 2020-11-23

## 2020-11-21 RX ORDER — KETOROLAC TROMETHAMINE 30 MG/ML
15 SYRINGE (ML) INJECTION EVERY 8 HOURS
Refills: 0 | Status: DISCONTINUED | OUTPATIENT
Start: 2020-11-21 | End: 2020-11-23

## 2020-11-21 RX ORDER — MIDODRINE HYDROCHLORIDE 2.5 MG/1
5 TABLET ORAL EVERY 8 HOURS
Refills: 0 | Status: DISCONTINUED | OUTPATIENT
Start: 2020-11-21 | End: 2020-11-21

## 2020-11-21 RX ORDER — IPRATROPIUM BROMIDE 0.2 MG/ML
500 SOLUTION, NON-ORAL INHALATION EVERY 6 HOURS
Refills: 0 | Status: DISCONTINUED | OUTPATIENT
Start: 2020-11-21 | End: 2020-11-21

## 2020-11-21 RX ORDER — SODIUM CHLORIDE 9 MG/ML
250 INJECTION, SOLUTION INTRAVENOUS ONCE
Refills: 0 | Status: COMPLETED | OUTPATIENT
Start: 2020-11-21 | End: 2020-11-21

## 2020-11-21 RX ORDER — TIOTROPIUM BROMIDE 18 UG/1
1 CAPSULE ORAL; RESPIRATORY (INHALATION) DAILY
Refills: 0 | Status: DISCONTINUED | OUTPATIENT
Start: 2020-11-21 | End: 2020-11-23

## 2020-11-21 RX ORDER — METOCLOPRAMIDE HCL 10 MG
10 TABLET ORAL EVERY 8 HOURS
Refills: 0 | Status: DISCONTINUED | OUTPATIENT
Start: 2020-11-21 | End: 2020-11-23

## 2020-11-21 RX ORDER — SODIUM CHLORIDE 9 MG/ML
500 INJECTION INTRAMUSCULAR; INTRAVENOUS; SUBCUTANEOUS ONCE
Refills: 0 | Status: COMPLETED | OUTPATIENT
Start: 2020-11-21 | End: 2020-11-21

## 2020-11-21 RX ORDER — MAGNESIUM SULFATE 500 MG/ML
2 VIAL (ML) INJECTION ONCE
Refills: 0 | Status: COMPLETED | OUTPATIENT
Start: 2020-11-21 | End: 2020-11-21

## 2020-11-21 RX ORDER — LIDOCAINE 4 G/100G
1 CREAM TOPICAL DAILY
Refills: 0 | Status: DISCONTINUED | OUTPATIENT
Start: 2020-11-21 | End: 2020-11-23

## 2020-11-21 RX ORDER — ACETAMINOPHEN 500 MG
1000 TABLET ORAL ONCE
Refills: 0 | Status: COMPLETED | OUTPATIENT
Start: 2020-11-21 | End: 2021-10-20

## 2020-11-21 RX ADMIN — HEPARIN SODIUM 5000 UNIT(S): 5000 INJECTION INTRAVENOUS; SUBCUTANEOUS at 06:10

## 2020-11-21 RX ADMIN — Medication 12.5 MILLIGRAM(S): at 17:13

## 2020-11-21 RX ADMIN — SODIUM CHLORIDE 30 MILLILITER(S): 9 INJECTION, SOLUTION INTRAVENOUS at 17:17

## 2020-11-21 RX ADMIN — Medication 50 GRAM(S): at 16:40

## 2020-11-21 RX ADMIN — Medication 400 MILLIGRAM(S): at 20:13

## 2020-11-21 RX ADMIN — LIDOCAINE 1 PATCH: 4 CREAM TOPICAL at 19:07

## 2020-11-21 RX ADMIN — TIOTROPIUM BROMIDE 1 CAPSULE(S): 18 CAPSULE ORAL; RESPIRATORY (INHALATION) at 21:40

## 2020-11-21 RX ADMIN — BUDESONIDE AND FORMOTEROL FUMARATE DIHYDRATE 2 PUFF(S): 160; 4.5 AEROSOL RESPIRATORY (INHALATION) at 21:38

## 2020-11-21 RX ADMIN — SODIUM CHLORIDE 250 MILLILITER(S): 9 INJECTION INTRAMUSCULAR; INTRAVENOUS; SUBCUTANEOUS at 20:14

## 2020-11-21 RX ADMIN — ONDANSETRON 4 MILLIGRAM(S): 8 TABLET, FILM COATED ORAL at 13:22

## 2020-11-21 RX ADMIN — SODIUM CHLORIDE 30 MILLILITER(S): 9 INJECTION, SOLUTION INTRAVENOUS at 19:06

## 2020-11-21 RX ADMIN — Medication 5 MILLIGRAM(S): at 06:10

## 2020-11-21 RX ADMIN — PHENYLEPHRINE HYDROCHLORIDE 9.62 MICROGRAM(S)/KG/MIN: 10 INJECTION INTRAVENOUS at 17:17

## 2020-11-21 RX ADMIN — HEPARIN SODIUM 5000 UNIT(S): 5000 INJECTION INTRAVENOUS; SUBCUTANEOUS at 13:22

## 2020-11-21 RX ADMIN — Medication 1 PATCH: at 19:07

## 2020-11-21 RX ADMIN — ONDANSETRON 4 MILLIGRAM(S): 8 TABLET, FILM COATED ORAL at 00:09

## 2020-11-21 RX ADMIN — HEPARIN SODIUM 5000 UNIT(S): 5000 INJECTION INTRAVENOUS; SUBCUTANEOUS at 21:07

## 2020-11-21 RX ADMIN — PHENYLEPHRINE HYDROCHLORIDE 9.62 MICROGRAM(S)/KG/MIN: 10 INJECTION INTRAVENOUS at 19:06

## 2020-11-21 RX ADMIN — Medication 400 MILLIGRAM(S): at 01:00

## 2020-11-21 RX ADMIN — Medication 5 MILLIGRAM(S): at 17:13

## 2020-11-21 RX ADMIN — Medication 1 PATCH: at 17:48

## 2020-11-21 RX ADMIN — SODIUM CHLORIDE 1000 MILLILITER(S): 9 INJECTION, SOLUTION INTRAVENOUS at 05:00

## 2020-11-21 RX ADMIN — LIDOCAINE 1 PATCH: 4 CREAM TOPICAL at 13:22

## 2020-11-21 RX ADMIN — SODIUM CHLORIDE 1000 MILLILITER(S): 9 INJECTION, SOLUTION INTRAVENOUS at 06:00

## 2020-11-21 RX ADMIN — Medication 1000 MILLIGRAM(S): at 20:40

## 2020-11-21 NOTE — PHYSICAL THERAPY INITIAL EVALUATION ADULT - MD ORDER
Thoracoscopic wedge resection of left lung 20-Nov-2020 10:37:44 Uniportal left upper lobe wedge Kee Colon  Thoracoscopic lobectomy of left lung 20-Nov-2020 10:37:29 Uniportal left lower lobectomy Kee Colon  Flexible bronchoscopy 20-Nov-2020 10:37:06  Kee Colon.

## 2020-11-21 NOTE — PHYSICAL THERAPY INITIAL EVALUATION ADULT - PATIENT PROFILE REVIEW, REHAB EVAL
PT orders received: Ambulate with assistance. Consult with LEONEL Hawley, pt may participate in PT evaluation.

## 2020-11-21 NOTE — PROGRESS NOTE ADULT - SUBJECTIVE AND OBJECTIVE BOX
PROCEDURE: Uniportal left lower lobectomy, left upper lobe wedge and mediastinal lymph node dissection  20-Nov-2020       ISSUES:   Post operative hypotension  Hypoxia  Post op pain  Chest tube in place  COPD  Lung mass  Osteoporosis         INTERVAL EVENTS:   Received patient on phenylephrine. Titrated to MAP 65.  TTE showed dilated IVC. Normal RV size.  Patient with airleak         HISTORY:   Patient reports moderate pain at chest wall incision sites which is worse with coughing and deep breathing without associated fever or dyspnea. Pain is improved with use of pain meds.     PHYSICAL EXAM:   Gen: Comfortable, No acute distress  Eyes: Sclera white, Conjunctiva normal, Eyelids normal, Pupils symmetrical   ENT: Mucous membranes moist,  ,  ,    Neck: Trachea midline,  ,  ,  ,  ,    CV: Rate regular, Rhythm regular,  ,  ,    Resp: Breath sounds clear, No accessory muscles use, L chest tube in place,    Abd: Soft, Non-distended, Non-tender, Bowel sounds normal,  ,  ,    Skin: Warm, No peripheral edema of lower extremities,  ,    : No angelo  Neuro: Moving all 4 extremities,    Psych: A&Ox3      ASSESSMENT AND PLAN:     NEURO:  Post-operative Pain - Pain control with oxycodone PO      RESPIRATORY:  Hypoxia - Wean nasal cannula for goal O2sat above 92. Obtain CXR. Incentive spirometry. Chest PT and frequent suctioning. Continue bronchodilators. OOB to chair & ambulate w/ assistance. Continuous pulse oximetry for support & to prevent decompensation.     Chest tube – Pleurevac regulated suctioning. Monitor chest tube output.         COPD - stable. Continue home inhalers.         CARDIOVASCULAR:  Post-operative Hypotension requiring IV pressors - wean pressors for MAP goal of 65         RENAL:  Stable - Monitor IOs and electrolytes. Keep K above 4.0 and Mg above 2.0.         GASTROINTESTINAL:  GI prophylaxis not indicated  Zofran and Reglan IV PRN for nausea  Regular consistency diet        HEMATOLOGIC:  No signs of active bleeding. Monitor Hgb in CBC in AM  DVT prophylaxis with heparin subQ and SCDs.         INFECTIOUS DISEASE:  All surgical sites appear clean. Will monitor for fever and leukocytosis.       ENDOCRINE:  Stable – Monitor glucose fingersticks for goal 120-180.            Pertinent clinical, laboratory, radiographic, hemodynamic, echocardiographic, respiratory data, microbiologic data and chart were reviewed by myself and analyzed frequently throughout the course of the day and night by myself.    Plan discussed at length with the CTICU staff and Attending CT Surgeon.     Patient's status was discussed with patient at bedside.    Patient required critical care management.  75 minutes were spent evaluating, managing, providing, coordinating, and documenting care for this patient, exclusive of procedures from  7AM to 1159PM.      ________________________________________________      _________________________  VITAL SIGNS:  Vital Signs Last 24 Hrs  T(C): 36.1 (21 Nov 2020 16:00), Max: 36.9 (21 Nov 2020 08:00)  T(F): 97 (21 Nov 2020 16:00), Max: 98.4 (21 Nov 2020 08:00)  HR: 93 (21 Nov 2020 18:00) (93 - 126)  BP: 100/67 (21 Nov 2020 18:00) (75/50 - 125/64)  BP(mean): 74 (21 Nov 2020 18:00) (56 - 88)  RR: 26 (21 Nov 2020 18:00) (20 - 29)  SpO2: 95% (21 Nov 2020 18:00) (93% - 100%)  I/Os:   I&O's Detail    20 Nov 2020 07:01  -  21 Nov 2020 07:00  --------------------------------------------------------  IN:    Lactated Ringers: 600 mL    Lactated Ringers Bolus: 1250 mL    Oral Fluid: 480 mL  Total IN: 2330 mL    OUT:    Chest Tube (mL): 125 mL    Voided (mL): 300 mL  Total OUT: 425 mL    Total NET: 1905 mL      21 Nov 2020 07:01  -  21 Nov 2020 18:42  --------------------------------------------------------  IN:    IV PiggyBack: 50 mL    Lactated Ringers: 360 mL    Oral Fluid: 720 mL    Phenylephrine: 324 mL  Total IN: 1454 mL    OUT:    Chest Tube (mL): 110 mL    Voided (mL): 900 mL  Total OUT: 1010 mL    Total NET: 444 mL              MEDICATIONS:  MEDICATIONS  (STANDING):  acetaminophen  IVPB .. 1000 milliGRAM(s) IV Intermittent once  acetaminophen  IVPB .. 1000 milliGRAM(s) IV Intermittent once  acetaminophen  IVPB .. 1000 milliGRAM(s) IV Intermittent once  acetaminophen  IVPB .. 1000 milliGRAM(s) IV Intermittent once  budesonide 160 MICROgram(s)/formoterol 4.5 MICROgram(s) Inhaler 2 Puff(s) Inhalation two times a day  busPIRone 5 milliGRAM(s) Oral every 12 hours  heparin   Injectable 5000 Unit(s) SubCutaneous every 8 hours  lactated ringers. 1000 milliLiter(s) (30 mL/Hr) IV Continuous <Continuous>  lidocaine   Patch 1 Patch Transdermal daily  metoprolol tartrate 12.5 milliGRAM(s) Oral every 12 hours  nicotine - 21 mG/24Hr(s) Patch 1 patch Transdermal daily  phenylephrine    Infusion 0.5 MICROgram(s)/kG/Min (9.62 mL/Hr) IV Continuous <Continuous>  tiotropium 18 MICROgram(s) Capsule 1 Capsule(s) Inhalation daily    MEDICATIONS  (PRN):  ketorolac   Injectable 15 milliGRAM(s) IV Push every 8 hours PRN Moderate Pain (4 - 6)  metoclopramide Injectable 10 milliGRAM(s) IV Push every 8 hours PRN nausea or vomiting not controlled by zofran  ondansetron Injectable 4 milliGRAM(s) IV Push every 8 hours PRN Nausea and/or Vomiting  senna 2 Tablet(s) Oral at bedtime PRN Constipation      LABS:                        12.0   18.41 )-----------( 398      ( 21 Nov 2020 02:15 )             37.5     11-21    130<L>  |  96<L>  |  14  ----------------------------<  151<H>  4.6   |  23  |  0.81    Ca    9.3      21 Nov 2020 02:15  Mg     1.9     11-21    TPro  6.6  /  Alb  3.3  /  TBili  0.2  /  DBili  < 0.2  /  AST  39<H>  /  ALT  15  /  AlkPhos  66  11-21    LIVER FUNCTIONS - ( 21 Nov 2020 02:15 )  Alb: 3.3 g/dL / Pro: 6.6 g/dL / ALK PHOS: 66 u/L / ALT: 15 u/L / AST: 39 u/L / GGT: x                 _________________________             PROCEDURE: Uniportal left lower lobectomy, left upper lobe wedge and mediastinal lymph node dissection  20-Nov-2020       ISSUES:   Post operative hypotension  Hypoxia  Post op pain  Chest tube in place  COPD  Lung mass  Osteoporosis         INTERVAL EVENTS:   Received patient on phenylephrine. Titrated to MAP 65.  TTE showed dilated IVC. Normal RV size.  Patient with airleak         HISTORY:   Patient reports moderate pain at chest wall incision sites which is worse with coughing and deep breathing without associated fever or dyspnea. Pain is improved with use of pain meds.     PHYSICAL EXAM:   Gen: Comfortable, No acute distress  Eyes: Sclera white, Conjunctiva normal, Eyelids normal, Pupils symmetrical   ENT: Mucous membranes moist,  ,  ,    Neck: Trachea midline,  ,  ,  ,  ,    CV: Rate regular, Rhythm regular,  ,  ,    Resp: Breath sounds clear, No accessory muscles use, L chest tube in place,    Abd: Soft, Non-distended, Non-tender, Bowel sounds normal,  ,  ,    Skin: Warm, No peripheral edema of lower extremities,  ,    : No angelo  Neuro: Moving all 4 extremities,    Psych: A&Ox3      ASSESSMENT AND PLAN:     NEURO:  Post-operative Pain - Pain control with oxycodone PO      RESPIRATORY:  Hypoxia - Wean nasal cannula for goal O2sat above 92. Obtain CXR. Incentive spirometry. Chest PT and frequent suctioning. Continue bronchodilators. OOB to chair & ambulate w/ assistance. Continuous pulse oximetry for support & to prevent decompensation.     Chest tube – Pleurevac regulated suctioning. Monitor chest tube output.         COPD - stable. Continue home inhalers.         CARDIOVASCULAR:  Post-operative Hypotension requiring IV pressors - wean pressors for MAP goal of 65         RENAL:  Stable - Monitor IOs and electrolytes. Keep K above 4.0 and Mg above 2.0.     Hyponatremia - hypovolemia and low solute. IVFs. Encourage PO intake.    GASTROINTESTINAL:  GI prophylaxis not indicated  Zofran and Reglan IV PRN for nausea  Regular consistency diet        HEMATOLOGIC:  No signs of active bleeding. Monitor Hgb in CBC in AM  DVT prophylaxis with heparin subQ and SCDs.         INFECTIOUS DISEASE:  All surgical sites appear clean. Will monitor for fever and leukocytosis.       ENDOCRINE:  Stable – Monitor glucose fingersticks for goal 120-180.            Pertinent clinical, laboratory, radiographic, hemodynamic, echocardiographic, respiratory data, microbiologic data and chart were reviewed by myself and analyzed frequently throughout the course of the day and night by myself.    Plan discussed at length with the CTICU staff and Attending CT Surgeon.     Patient's status was discussed with patient at bedside.    Patient required critical care management.  75 minutes were spent evaluating, managing, providing, coordinating, and documenting care for this patient, exclusive of procedures from  7AM to 1159PM.      ________________________________________________      _________________________  VITAL SIGNS:  Vital Signs Last 24 Hrs  T(C): 36.1 (21 Nov 2020 16:00), Max: 36.9 (21 Nov 2020 08:00)  T(F): 97 (21 Nov 2020 16:00), Max: 98.4 (21 Nov 2020 08:00)  HR: 93 (21 Nov 2020 18:00) (93 - 126)  BP: 100/67 (21 Nov 2020 18:00) (75/50 - 125/64)  BP(mean): 74 (21 Nov 2020 18:00) (56 - 88)  RR: 26 (21 Nov 2020 18:00) (20 - 29)  SpO2: 95% (21 Nov 2020 18:00) (93% - 100%)  I/Os:   I&O's Detail    20 Nov 2020 07:01  -  21 Nov 2020 07:00  --------------------------------------------------------  IN:    Lactated Ringers: 600 mL    Lactated Ringers Bolus: 1250 mL    Oral Fluid: 480 mL  Total IN: 2330 mL    OUT:    Chest Tube (mL): 125 mL    Voided (mL): 300 mL  Total OUT: 425 mL    Total NET: 1905 mL      21 Nov 2020 07:01  -  21 Nov 2020 18:42  --------------------------------------------------------  IN:    IV PiggyBack: 50 mL    Lactated Ringers: 360 mL    Oral Fluid: 720 mL    Phenylephrine: 324 mL  Total IN: 1454 mL    OUT:    Chest Tube (mL): 110 mL    Voided (mL): 900 mL  Total OUT: 1010 mL    Total NET: 444 mL              MEDICATIONS:  MEDICATIONS  (STANDING):  acetaminophen  IVPB .. 1000 milliGRAM(s) IV Intermittent once  acetaminophen  IVPB .. 1000 milliGRAM(s) IV Intermittent once  acetaminophen  IVPB .. 1000 milliGRAM(s) IV Intermittent once  acetaminophen  IVPB .. 1000 milliGRAM(s) IV Intermittent once  budesonide 160 MICROgram(s)/formoterol 4.5 MICROgram(s) Inhaler 2 Puff(s) Inhalation two times a day  busPIRone 5 milliGRAM(s) Oral every 12 hours  heparin   Injectable 5000 Unit(s) SubCutaneous every 8 hours  lactated ringers. 1000 milliLiter(s) (30 mL/Hr) IV Continuous <Continuous>  lidocaine   Patch 1 Patch Transdermal daily  metoprolol tartrate 12.5 milliGRAM(s) Oral every 12 hours  nicotine - 21 mG/24Hr(s) Patch 1 patch Transdermal daily  phenylephrine    Infusion 0.5 MICROgram(s)/kG/Min (9.62 mL/Hr) IV Continuous <Continuous>  tiotropium 18 MICROgram(s) Capsule 1 Capsule(s) Inhalation daily    MEDICATIONS  (PRN):  ketorolac   Injectable 15 milliGRAM(s) IV Push every 8 hours PRN Moderate Pain (4 - 6)  metoclopramide Injectable 10 milliGRAM(s) IV Push every 8 hours PRN nausea or vomiting not controlled by zofran  ondansetron Injectable 4 milliGRAM(s) IV Push every 8 hours PRN Nausea and/or Vomiting  senna 2 Tablet(s) Oral at bedtime PRN Constipation      LABS:                        12.0   18.41 )-----------( 398      ( 21 Nov 2020 02:15 )             37.5     11-21    130<L>  |  96<L>  |  14  ----------------------------<  151<H>  4.6   |  23  |  0.81    Ca    9.3      21 Nov 2020 02:15  Mg     1.9     11-21    TPro  6.6  /  Alb  3.3  /  TBili  0.2  /  DBili  < 0.2  /  AST  39<H>  /  ALT  15  /  AlkPhos  66  11-21    LIVER FUNCTIONS - ( 21 Nov 2020 02:15 )  Alb: 3.3 g/dL / Pro: 6.6 g/dL / ALK PHOS: 66 u/L / ALT: 15 u/L / AST: 39 u/L / GGT: x                 _________________________

## 2020-11-21 NOTE — PROGRESS NOTE ADULT - SUBJECTIVE AND OBJECTIVE BOX
Anesthesia Pain Management Service    SUBJECTIVE: Pt seen at bedside. States that she has some pain in the back, and around the left side of her chest, but has improved. Pt currently off IV PCA.    Therapy:	  [ ] IV PCA	   [ ] Epidural           [ ] s/p Spinal Opoid              [ ] Postpartum infusion	  [ ] Patient controlled regional anesthesia (PCRA)    [X ] prn Analgesics    Allergies  No Known Allergies    Intolerances      MEDICATIONS  (STANDING):  acetaminophen  IVPB .. 1000 milliGRAM(s) IV Intermittent once  acetaminophen  IVPB .. 1000 milliGRAM(s) IV Intermittent once  acetaminophen  IVPB .. 1000 milliGRAM(s) IV Intermittent once  acetaminophen  IVPB .. 1000 milliGRAM(s) IV Intermittent once  budesonide 160 MICROgram(s)/formoterol 4.5 MICROgram(s) Inhaler 2 Puff(s) Inhalation two times a day  busPIRone 5 milliGRAM(s) Oral every 12 hours  heparin   Injectable 5000 Unit(s) SubCutaneous every 8 hours  ipratropium    for Nebulization 500 MICROGram(s) Nebulizer every 6 hours  lactated ringers. 1000 milliLiter(s) (30 mL/Hr) IV Continuous <Continuous>  lidocaine   Patch 1 Patch Transdermal daily  phenylephrine    Infusion 0.5 MICROgram(s)/kG/Min (9.62 mL/Hr) IV Continuous <Continuous>    MEDICATIONS  (PRN):  ketorolac   Injectable 15 milliGRAM(s) IV Push every 8 hours PRN Moderate Pain (4 - 6)  metoclopramide Injectable 10 milliGRAM(s) IV Push every 8 hours PRN nausea or vomiting not controlled by zofran  ondansetron Injectable 4 milliGRAM(s) IV Push every 8 hours PRN Nausea and/or Vomiting  senna 2 Tablet(s) Oral at bedtime PRN Constipation      OBJECTIVE:   [X] No new signs     [ ] Other:    Side Effects:  [X ] None			[ ] Other:    Assessment of Catheter Site:		[ ] Intact		[ ] Other:    ASSESSMENT/PLAN  [ ] Continue current therapy    [X ] Therapy changed to:    [ ] IV PCA       [ ] Epidural     [ X] prn Analgesics     Comments:   - Pt off IV PCA per primary team  - continue with non-opioid adjuvants        Sharon Laboy, PGY 2  Anesthesiology  Pager: [phone]    Progress Note written now but Patient was seen earlier.

## 2020-11-21 NOTE — PROGRESS NOTE ADULT - SUBJECTIVE AND OBJECTIVE BOX
ANESTHESIA POSTOP CHECK    60y Female POSTOP DAY 1 S/P L VATS.    Vital Signs Last 24 Hrs  T(C): 36.9 (21 Nov 2020 08:00), Max: 36.9 (21 Nov 2020 08:00)  T(F): 98.4 (21 Nov 2020 08:00), Max: 98.4 (21 Nov 2020 08:00)  HR: 103 (21 Nov 2020 10:00) (88 - 126)  BP: 91/56 (21 Nov 2020 10:00) (75/50 - 125/64)  BP(mean): 63 (21 Nov 2020 10:00) (56 - 86)  RR: 26 (21 Nov 2020 10:00) (13 - 28)  SpO2: 98% (21 Nov 2020 10:00) (93% - 100%)    I&O's Summary    20 Nov 2020 07:01  -  21 Nov 2020 07:00  --------------------------------------------------------  IN: 2330 mL / OUT: 425 mL / NET: 1905 mL    21 Nov 2020 07:01  -  21 Nov 2020 11:57  --------------------------------------------------------  IN: 417 mL / OUT: 160 mL / NET: 257 mL        [X ] NO APPARENT ANESTHESIA COMPLICATIONS      Sharon Laboy, PGY 2  Anesthesiology  Pager: [phone]

## 2020-11-21 NOTE — PHYSICAL THERAPY INITIAL EVALUATION ADULT - ADDITIONAL COMMENTS
Patient lived in a private house alone, requiring 4 steps to enter. +handrail. Patient ambulated independently and without an assistive device. Prior to admission, patient was independent across all transfers and ADL skills.    Patient was left seated in recliner, with all lines/tubes intact. LEONEL javier.

## 2020-11-21 NOTE — PROGRESS NOTE ADULT - SUBJECTIVE AND OBJECTIVE BOX
Anesthesia Pain Management Service    SUBJECTIVE: Pt now off IV PCA without problems reported.    Therapy:	  [ X] IV PCA	   [ ] Epidural           [ ] s/p Spinal Opoid              [ ] Postpartum infusion	  [ ] Patient controlled regional anesthesia (PCRA)    [ ] prn Analgesics    Allergies    No Known Allergies    Intolerances      MEDICATIONS  (STANDING):  acetaminophen  IVPB .. 1000 milliGRAM(s) IV Intermittent once  acetaminophen  IVPB .. 1000 milliGRAM(s) IV Intermittent once  acetaminophen  IVPB .. 1000 milliGRAM(s) IV Intermittent once  acetaminophen  IVPB .. 1000 milliGRAM(s) IV Intermittent once  budesonide 160 MICROgram(s)/formoterol 4.5 MICROgram(s) Inhaler 2 Puff(s) Inhalation two times a day  busPIRone 5 milliGRAM(s) Oral every 12 hours  heparin   Injectable 5000 Unit(s) SubCutaneous every 8 hours  ipratropium    for Nebulization 500 MICROGram(s) Nebulizer every 6 hours  lactated ringers. 1000 milliLiter(s) (30 mL/Hr) IV Continuous <Continuous>  lidocaine   Patch 1 Patch Transdermal daily  phenylephrine    Infusion 0.5 MICROgram(s)/kG/Min (9.62 mL/Hr) IV Continuous <Continuous>    MEDICATIONS  (PRN):  ketorolac   Injectable 15 milliGRAM(s) IV Push every 8 hours PRN Moderate Pain (4 - 6)  senna 2 Tablet(s) Oral at bedtime PRN Constipation      OBJECTIVE:   [X] No new signs     [ ] Other:    Side Effects:  [X ] None			[ ] Other:    Assessment of Catheter Site:		[ ] Intact		[ ] Other:    ASSESSMENT/PLAN  [ ] Continue current therapy    [X ] Therapy changed to:    [ ] IV PCA       [ ] Epidural     [ X] prn Analgesics     Comments: PRN Oral/IV opioids and/or non-opioid adjuvant analgesics to be used at this point.    Progress Note written now but Patient was seen earlier.

## 2020-11-21 NOTE — PHYSICAL THERAPY INITIAL EVALUATION ADULT - GENERAL OBSERVATIONS, REHAB EVAL
Patient was received seated in recliner, with c/o dizziness, per RN, secondary to attempting sit<->stand transfer, but is willing to participate in PT session. BP noted 95/57. RN Marelen present and aware. +tele, +IV, +chest tube

## 2020-11-21 NOTE — PHYSICAL THERAPY INITIAL EVALUATION ADULT - PERTINENT HX OF CURRENT PROBLEM, REHAB EVAL
Patient is a 61 yo female s/p VATS procedure.  She reports abnormal lung finding on annual CT scan done as surveillance due to her smoking history. PMH: 30-pack year smoker with history of COPD.

## 2020-11-22 LAB
ALBUMIN SERPL ELPH-MCNC: 3.3 G/DL — SIGNIFICANT CHANGE UP (ref 3.3–5)
ALP SERPL-CCNC: 70 U/L — SIGNIFICANT CHANGE UP (ref 40–120)
ALT FLD-CCNC: 30 U/L — SIGNIFICANT CHANGE UP (ref 4–33)
ANION GAP SERPL CALC-SCNC: 7 MMO/L — SIGNIFICANT CHANGE UP (ref 7–14)
ANION GAP SERPL CALC-SCNC: 9 MMO/L — SIGNIFICANT CHANGE UP (ref 7–14)
APTT BLD: 29.7 SEC — SIGNIFICANT CHANGE UP (ref 27–36.3)
AST SERPL-CCNC: 58 U/L — HIGH (ref 4–32)
BILIRUB SERPL-MCNC: 0.2 MG/DL — SIGNIFICANT CHANGE UP (ref 0.2–1.2)
BLD GP AB SCN SERPL QL: NEGATIVE — SIGNIFICANT CHANGE UP
BUN SERPL-MCNC: 10 MG/DL — SIGNIFICANT CHANGE UP (ref 7–23)
BUN SERPL-MCNC: 11 MG/DL — SIGNIFICANT CHANGE UP (ref 7–23)
CALCIUM SERPL-MCNC: 8.6 MG/DL — SIGNIFICANT CHANGE UP (ref 8.4–10.5)
CALCIUM SERPL-MCNC: 9 MG/DL — SIGNIFICANT CHANGE UP (ref 8.4–10.5)
CHLORIDE SERPL-SCNC: 93 MMOL/L — LOW (ref 98–107)
CHLORIDE SERPL-SCNC: 96 MMOL/L — LOW (ref 98–107)
CO2 SERPL-SCNC: 24 MMOL/L — SIGNIFICANT CHANGE UP (ref 22–31)
CO2 SERPL-SCNC: 25 MMOL/L — SIGNIFICANT CHANGE UP (ref 22–31)
CREAT SERPL-MCNC: 0.51 MG/DL — SIGNIFICANT CHANGE UP (ref 0.5–1.3)
CREAT SERPL-MCNC: 0.58 MG/DL — SIGNIFICANT CHANGE UP (ref 0.5–1.3)
GLUCOSE SERPL-MCNC: 126 MG/DL — HIGH (ref 70–99)
GLUCOSE SERPL-MCNC: 128 MG/DL — HIGH (ref 70–99)
HCT VFR BLD CALC: 30.7 % — LOW (ref 34.5–45)
HCT VFR BLD CALC: 33.3 % — LOW (ref 34.5–45)
HGB BLD-MCNC: 10.6 G/DL — LOW (ref 11.5–15.5)
HGB BLD-MCNC: 9.9 G/DL — LOW (ref 11.5–15.5)
INR BLD: 1.08 — SIGNIFICANT CHANGE UP (ref 0.88–1.16)
MAGNESIUM SERPL-MCNC: 2.1 MG/DL — SIGNIFICANT CHANGE UP (ref 1.6–2.6)
MAGNESIUM SERPL-MCNC: 2.2 MG/DL — SIGNIFICANT CHANGE UP (ref 1.6–2.6)
MCHC RBC-ENTMCNC: 30.5 PG — SIGNIFICANT CHANGE UP (ref 27–34)
MCHC RBC-ENTMCNC: 31 PG — SIGNIFICANT CHANGE UP (ref 27–34)
MCHC RBC-ENTMCNC: 31.8 % — LOW (ref 32–36)
MCHC RBC-ENTMCNC: 32.2 % — SIGNIFICANT CHANGE UP (ref 32–36)
MCV RBC AUTO: 95.7 FL — SIGNIFICANT CHANGE UP (ref 80–100)
MCV RBC AUTO: 96.2 FL — SIGNIFICANT CHANGE UP (ref 80–100)
NRBC # FLD: 0 K/UL — SIGNIFICANT CHANGE UP (ref 0–0)
NRBC # FLD: 0 K/UL — SIGNIFICANT CHANGE UP (ref 0–0)
PHOSPHATE SERPL-MCNC: 2.2 MG/DL — LOW (ref 2.5–4.5)
PLATELET # BLD AUTO: 305 K/UL — SIGNIFICANT CHANGE UP (ref 150–400)
PLATELET # BLD AUTO: 316 K/UL — SIGNIFICANT CHANGE UP (ref 150–400)
PMV BLD: 8.5 FL — SIGNIFICANT CHANGE UP (ref 7–13)
PMV BLD: 8.7 FL — SIGNIFICANT CHANGE UP (ref 7–13)
POTASSIUM SERPL-MCNC: 4.6 MMOL/L — SIGNIFICANT CHANGE UP (ref 3.5–5.3)
POTASSIUM SERPL-MCNC: 4.8 MMOL/L — SIGNIFICANT CHANGE UP (ref 3.5–5.3)
POTASSIUM SERPL-SCNC: 4.6 MMOL/L — SIGNIFICANT CHANGE UP (ref 3.5–5.3)
POTASSIUM SERPL-SCNC: 4.8 MMOL/L — SIGNIFICANT CHANGE UP (ref 3.5–5.3)
PROT SERPL-MCNC: 6.2 G/DL — SIGNIFICANT CHANGE UP (ref 6–8.3)
PROTHROM AB SERPL-ACNC: 12.4 SEC — SIGNIFICANT CHANGE UP (ref 10.6–13.6)
RBC # BLD: 3.19 M/UL — LOW (ref 3.8–5.2)
RBC # BLD: 3.48 M/UL — LOW (ref 3.8–5.2)
RBC # FLD: 12 % — SIGNIFICANT CHANGE UP (ref 10.3–14.5)
RBC # FLD: 12.1 % — SIGNIFICANT CHANGE UP (ref 10.3–14.5)
RH IG SCN BLD-IMP: POSITIVE — SIGNIFICANT CHANGE UP
SODIUM SERPL-SCNC: 126 MMOL/L — LOW (ref 135–145)
SODIUM SERPL-SCNC: 128 MMOL/L — LOW (ref 135–145)
WBC # BLD: 10.44 K/UL — SIGNIFICANT CHANGE UP (ref 3.8–10.5)
WBC # BLD: 10.94 K/UL — HIGH (ref 3.8–10.5)
WBC # FLD AUTO: 10.44 K/UL — SIGNIFICANT CHANGE UP (ref 3.8–10.5)
WBC # FLD AUTO: 10.94 K/UL — HIGH (ref 3.8–10.5)

## 2020-11-22 PROCEDURE — 71045 X-RAY EXAM CHEST 1 VIEW: CPT | Mod: 26,77

## 2020-11-22 PROCEDURE — 71045 X-RAY EXAM CHEST 1 VIEW: CPT | Mod: 26

## 2020-11-22 PROCEDURE — 99233 SBSQ HOSP IP/OBS HIGH 50: CPT

## 2020-11-22 RX ORDER — SODIUM CHLORIDE 9 MG/ML
250 INJECTION INTRAMUSCULAR; INTRAVENOUS; SUBCUTANEOUS ONCE
Refills: 0 | Status: COMPLETED | OUTPATIENT
Start: 2020-11-22 | End: 2020-11-22

## 2020-11-22 RX ORDER — BUDESONIDE, MICRONIZED 100 %
0.5 POWDER (GRAM) MISCELLANEOUS
Refills: 0 | Status: DISCONTINUED | OUTPATIENT
Start: 2020-11-22 | End: 2020-11-23

## 2020-11-22 RX ORDER — METOPROLOL TARTRATE 50 MG
12.5 TABLET ORAL
Refills: 0 | Status: DISCONTINUED | OUTPATIENT
Start: 2020-11-22 | End: 2020-11-22

## 2020-11-22 RX ORDER — METOPROLOL TARTRATE 50 MG
5 TABLET ORAL ONCE
Refills: 0 | Status: COMPLETED | OUTPATIENT
Start: 2020-11-22 | End: 2020-11-22

## 2020-11-22 RX ORDER — METOPROLOL TARTRATE 50 MG
12.5 TABLET ORAL EVERY 8 HOURS
Refills: 0 | Status: DISCONTINUED | OUTPATIENT
Start: 2020-11-22 | End: 2020-11-22

## 2020-11-22 RX ORDER — MAGNESIUM SULFATE 500 MG/ML
1 VIAL (ML) INJECTION ONCE
Refills: 0 | Status: COMPLETED | OUTPATIENT
Start: 2020-11-22 | End: 2020-11-22

## 2020-11-22 RX ORDER — ACETAMINOPHEN 500 MG
1000 TABLET ORAL ONCE
Refills: 0 | Status: COMPLETED | OUTPATIENT
Start: 2020-11-22 | End: 2020-11-22

## 2020-11-22 RX ORDER — METOPROLOL TARTRATE 50 MG
2.5 TABLET ORAL ONCE
Refills: 0 | Status: COMPLETED | OUTPATIENT
Start: 2020-11-22 | End: 2020-11-22

## 2020-11-22 RX ORDER — METOPROLOL TARTRATE 50 MG
25 TABLET ORAL EVERY 8 HOURS
Refills: 0 | Status: DISCONTINUED | OUTPATIENT
Start: 2020-11-22 | End: 2020-11-23

## 2020-11-22 RX ADMIN — HEPARIN SODIUM 5000 UNIT(S): 5000 INJECTION INTRAVENOUS; SUBCUTANEOUS at 21:07

## 2020-11-22 RX ADMIN — Medication 63.75 MILLIMOLE(S): at 12:03

## 2020-11-22 RX ADMIN — Medication 5 MILLIGRAM(S): at 17:58

## 2020-11-22 RX ADMIN — Medication 2.5 MILLIGRAM(S): at 09:15

## 2020-11-22 RX ADMIN — Medication 1 PATCH: at 19:50

## 2020-11-22 RX ADMIN — Medication 12.5 MILLIGRAM(S): at 12:03

## 2020-11-22 RX ADMIN — Medication 5 MILLIGRAM(S): at 05:05

## 2020-11-22 RX ADMIN — Medication 100 GRAM(S): at 12:42

## 2020-11-22 RX ADMIN — Medication 1000 MILLIGRAM(S): at 23:18

## 2020-11-22 RX ADMIN — Medication 1 PATCH: at 12:12

## 2020-11-22 RX ADMIN — Medication 25 MILLIGRAM(S): at 21:07

## 2020-11-22 RX ADMIN — Medication 2.5 MILLIGRAM(S): at 12:36

## 2020-11-22 RX ADMIN — LIDOCAINE 1 PATCH: 4 CREAM TOPICAL at 00:58

## 2020-11-22 RX ADMIN — Medication 1 PATCH: at 12:13

## 2020-11-22 RX ADMIN — HEPARIN SODIUM 5000 UNIT(S): 5000 INJECTION INTRAVENOUS; SUBCUTANEOUS at 14:25

## 2020-11-22 RX ADMIN — Medication 400 MILLIGRAM(S): at 22:48

## 2020-11-22 RX ADMIN — HEPARIN SODIUM 5000 UNIT(S): 5000 INJECTION INTRAVENOUS; SUBCUTANEOUS at 05:05

## 2020-11-22 RX ADMIN — Medication 0.5 MILLIGRAM(S): at 11:28

## 2020-11-22 RX ADMIN — Medication 15 MILLIGRAM(S): at 01:17

## 2020-11-22 RX ADMIN — LIDOCAINE 1 PATCH: 4 CREAM TOPICAL at 12:04

## 2020-11-22 RX ADMIN — Medication 1 PATCH: at 07:41

## 2020-11-22 RX ADMIN — Medication 5 MILLIGRAM(S): at 16:05

## 2020-11-22 RX ADMIN — TIOTROPIUM BROMIDE 1 CAPSULE(S): 18 CAPSULE ORAL; RESPIRATORY (INHALATION) at 11:29

## 2020-11-22 RX ADMIN — Medication 12.5 MILLIGRAM(S): at 03:24

## 2020-11-22 RX ADMIN — LIDOCAINE 1 PATCH: 4 CREAM TOPICAL at 19:50

## 2020-11-22 RX ADMIN — SODIUM CHLORIDE 500 MILLILITER(S): 9 INJECTION INTRAMUSCULAR; INTRAVENOUS; SUBCUTANEOUS at 06:45

## 2020-11-22 RX ADMIN — Medication 15 MILLIGRAM(S): at 01:37

## 2020-11-22 NOTE — PROGRESS NOTE ADULT - SUBJECTIVE AND OBJECTIVE BOX
AVIVA VALENCIA                     [id number]    HPI:  61 yo 30-pack year smoker with history of COPD presents to PST unit with other nonspecific abnormal finding of lung field scheduled for flexible bronchoscopy, left video assisted thoracoscopic surgery, lung resection with Dr. Zuleta. She reports abnormal ling finding on annual CT scan done as surveillance due to her smoking history.  (13 Nov 2020 14:23)      PROCEDURE: Uniportal left lower lobectomy, left upper lobe wedge and mediastinal lymph node dissection  20-Nov-2020       ISSUES:   Post operative hypotension, vasogenic  Hypoxia  Post op pain  Chest tube in place  COPD  Lung mass  Osteoporosis       PAST MEDICAL & SURGICAL HISTORY:  Other nonspecific abnormal finding of lung field    History of COPD    Osteoporosis    History of tonsillectomy    H/O ovarian cystectomy              VITAL SIGNS:  Vital Signs Last 24 Hrs  T(C): 36 (22 Nov 2020 15:00), Max: 36.4 (22 Nov 2020 00:00)  T(F): 96.8 (22 Nov 2020 15:00), Max: 97.6 (22 Nov 2020 00:00)  HR: 112 (22 Nov 2020 17:00) (90 - 129)  BP: 99/72 (22 Nov 2020 17:00) (95/69 - 120/67)  BP(mean): 79 (22 Nov 2020 17:00) (65 - 96)  RR: 31 (22 Nov 2020 17:00) (18 - 31)  SpO2: 96% (22 Nov 2020 17:00) (93% - 100%)    I/Os:   I&O's Detail    21 Nov 2020 07:01  -  22 Nov 2020 07:00  --------------------------------------------------------  IN:    IV PiggyBack: 150 mL    Lactated Ringers: 390 mL    Oral Fluid: 1020 mL    Phenylephrine: 403.8 mL    Sodium Chloride 0.9% Bolus: 500 mL  Total IN: 2463.8 mL    OUT:    Chest Tube (mL): 170 mL    Voided (mL): 1500 mL  Total OUT: 1670 mL    Total NET: 793.8 mL      22 Nov 2020 07:01  -  22 Nov 2020 18:06  --------------------------------------------------------  IN:    IV PiggyBack: 350 mL    Oral Fluid: 450 mL  Total IN: 800 mL    OUT:    Chest Tube (mL): 10 mL    Voided (mL): 350 mL  Total OUT: 360 mL    Total NET: 440 mL          CAPILLARY BLOOD GLUCOSE          =======================MEDICATIONS===================  MEDICATIONS  (STANDING):  acetaminophen  IVPB .. 1000 milliGRAM(s) IV Intermittent once  acetaminophen  IVPB .. 1000 milliGRAM(s) IV Intermittent once  acetaminophen  IVPB .. 1000 milliGRAM(s) IV Intermittent once  acetaminophen  IVPB .. 1000 milliGRAM(s) IV Intermittent once  buDESOnide    Inhalation Suspension 0.5 milliGRAM(s) Inhalation two times a day  busPIRone 5 milliGRAM(s) Oral every 12 hours  heparin   Injectable 5000 Unit(s) SubCutaneous every 8 hours  lidocaine   Patch 1 Patch Transdermal daily  metoprolol tartrate 25 milliGRAM(s) Oral every 8 hours  nicotine - 21 mG/24Hr(s) Patch 1 patch Transdermal daily  tiotropium 18 MICROgram(s) Capsule 1 Capsule(s) Inhalation daily    MEDICATIONS  (PRN):  ketorolac   Injectable 15 milliGRAM(s) IV Push every 8 hours PRN Moderate Pain (4 - 6)  metoclopramide Injectable 10 milliGRAM(s) IV Push every 8 hours PRN nausea or vomiting not controlled by zofran  ondansetron Injectable 4 milliGRAM(s) IV Push every 8 hours PRN Nausea and/or Vomiting  senna 2 Tablet(s) Oral at bedtime PRN Constipation      PHYSICAL EXAM============================  General:                         Awake, alert, not in any distress  Neuro:                            Moving all extremities to commands.   Respiratory:	Air entry fair and  bilateral conducted sounds                                           Effort even and unlabored.  CV:		Regular rate and rhythm. Normal S1/S2                                          Distal pulses present.  Abdomen:	                     Soft, non-distended. Bowel sounds present   Skin:		No rash.  Extremities:	Warm, no cyanosis or edema.  Palpable pulses    ============================LABS=========================                        10.6   10.44 )-----------( 305      ( 22 Nov 2020 10:44 )             33.3     11-22    126<L>  |  93<L>  |  10  ----------------------------<  128<H>  4.6   |  24  |  0.51    Ca    9.0      22 Nov 2020 10:44  Phos  2.2     11-22  Mg     2.1     11-22    TPro  6.2  /  Alb  3.3  /  TBili  0.2  /  DBili  x   /  AST  58<H>  /  ALT  30  /  AlkPhos  70  11-22    LIVER FUNCTIONS - ( 22 Nov 2020 10:44 )  Alb: 3.3 g/dL / Pro: 6.2 g/dL / ALK PHOS: 70 u/L / ALT: 30 u/L / AST: 58 u/L / GGT: x           PT/INR - ( 22 Nov 2020 03:20 )   PT: 12.4 SEC;   INR: 1.08          PTT - ( 22 Nov 2020 03:20 )  PTT:29.7 SEC        ============================IMAGING STUDIES=========================    < from: Xray Chest 1 View- PORTABLE-Urgent (Xray Chest 1 View- PORTABLE-Urgent .) (11.22.20 @ 14:33) >    INTERPRETATION:  INDICATION: Chest tube clamped. Rule out pneumothorax.    TECHNIQUE: Single portable view of the chest.    COMPARISON: 11/22/2020 at 4:56 AM    IMPRESSION: There is a left-sided chest tube in place. No left-sided pneumothorax is seen. There are bilateral small pleural effusions, unchanged.    A/P:  =============================NEUROLOGY============================  Pain control with PCA /  Tylenol IV     ==============================RESPIRATORY========================  Pt is on  2  L nasal canula   Comfortable, not in any distress.  Using incentive spirometry   Monitor chest tube output  Chest tube to  water seal,	  Continue bronchodilators, pulmonary toilet    ============================CARDIOVASCULAR======================  Continue hemodynamic monitoring.  Hypotension, IVF, off Jose drip    =====================RENAL===================  Continue LR 30CC/hr    Monitor I/Os and electrolytes    ====================GASTROINTESTINAL===================   tolerating PO  Continue GI prophylaxis with Pepcid   Continue Zofran / Reglan for nausea - PRN	    ========================HEMATOLOGIC/ONCOLOGIC====================  Monitor chest tube output. No signs of active bleeding.   Follow CBC in AM    ============================INFECTIOUS DISEASE========================  Monitor for fever / leukocytosis.  All surgical incision / chest tube  sites look clean      Pt is on GI & DVT prophylaxis  OOB & ambulate       Pertinent clinical, laboratory, radiographic, hemodynamic, echocardiographic, respiratory data, microbiologic data and chart were reviewed and analyzed frequently throughout the course of the day and night  Patient seen, examined and plan discussed with CT Surgery / CTICU team during rounds.    Pt's status discussed with family at bedside, updated status        Charles Viramontes DO FACEP

## 2020-11-23 ENCOUNTER — TRANSCRIPTION ENCOUNTER (OUTPATIENT)
Age: 60
End: 2020-11-23

## 2020-11-23 VITALS
SYSTOLIC BLOOD PRESSURE: 118 MMHG | DIASTOLIC BLOOD PRESSURE: 69 MMHG | RESPIRATION RATE: 35 BRPM | HEART RATE: 108 BPM | OXYGEN SATURATION: 98 %

## 2020-11-23 LAB
ANION GAP SERPL CALC-SCNC: 9 MMO/L — SIGNIFICANT CHANGE UP (ref 7–14)
BUN SERPL-MCNC: 7 MG/DL — SIGNIFICANT CHANGE UP (ref 7–23)
CALCIUM SERPL-MCNC: 9.1 MG/DL — SIGNIFICANT CHANGE UP (ref 8.4–10.5)
CHLORIDE SERPL-SCNC: 95 MMOL/L — LOW (ref 98–107)
CO2 SERPL-SCNC: 27 MMOL/L — SIGNIFICANT CHANGE UP (ref 22–31)
CREAT SERPL-MCNC: 0.51 MG/DL — SIGNIFICANT CHANGE UP (ref 0.5–1.3)
GLUCOSE SERPL-MCNC: 116 MG/DL — HIGH (ref 70–99)
HCT VFR BLD CALC: 35.2 % — SIGNIFICANT CHANGE UP (ref 34.5–45)
HGB BLD-MCNC: 11.9 G/DL — SIGNIFICANT CHANGE UP (ref 11.5–15.5)
MAGNESIUM SERPL-MCNC: 2.1 MG/DL — SIGNIFICANT CHANGE UP (ref 1.6–2.6)
MCHC RBC-ENTMCNC: 31.2 PG — SIGNIFICANT CHANGE UP (ref 27–34)
MCHC RBC-ENTMCNC: 33.8 % — SIGNIFICANT CHANGE UP (ref 32–36)
MCV RBC AUTO: 92.1 FL — SIGNIFICANT CHANGE UP (ref 80–100)
NRBC # FLD: 0 K/UL — SIGNIFICANT CHANGE UP (ref 0–0)
PHOSPHATE SERPL-MCNC: 2.6 MG/DL — SIGNIFICANT CHANGE UP (ref 2.5–4.5)
PLATELET # BLD AUTO: 337 K/UL — SIGNIFICANT CHANGE UP (ref 150–400)
PMV BLD: 8.4 FL — SIGNIFICANT CHANGE UP (ref 7–13)
POTASSIUM SERPL-MCNC: 4 MMOL/L — SIGNIFICANT CHANGE UP (ref 3.5–5.3)
POTASSIUM SERPL-SCNC: 4 MMOL/L — SIGNIFICANT CHANGE UP (ref 3.5–5.3)
RBC # BLD: 3.82 M/UL — SIGNIFICANT CHANGE UP (ref 3.8–5.2)
RBC # FLD: 11.9 % — SIGNIFICANT CHANGE UP (ref 10.3–14.5)
SODIUM SERPL-SCNC: 131 MMOL/L — LOW (ref 135–145)
WBC # BLD: 9.61 K/UL — SIGNIFICANT CHANGE UP (ref 3.8–10.5)
WBC # FLD AUTO: 9.61 K/UL — SIGNIFICANT CHANGE UP (ref 3.8–10.5)

## 2020-11-23 PROCEDURE — 71045 X-RAY EXAM CHEST 1 VIEW: CPT | Mod: 26,77

## 2020-11-23 PROCEDURE — 99233 SBSQ HOSP IP/OBS HIGH 50: CPT

## 2020-11-23 PROCEDURE — 71045 X-RAY EXAM CHEST 1 VIEW: CPT | Mod: 26

## 2020-11-23 RX ORDER — MAGNESIUM SULFATE 500 MG/ML
1 VIAL (ML) INJECTION ONCE
Refills: 0 | Status: COMPLETED | OUTPATIENT
Start: 2020-11-23 | End: 2020-11-23

## 2020-11-23 RX ORDER — SENNA PLUS 8.6 MG/1
2 TABLET ORAL
Qty: 14 | Refills: 0
Start: 2020-11-23 | End: 2020-11-29

## 2020-11-23 RX ORDER — OXYCODONE HYDROCHLORIDE 5 MG/1
1 TABLET ORAL
Qty: 30 | Refills: 0
Start: 2020-11-23 | End: 2020-11-27

## 2020-11-23 RX ORDER — METOPROLOL TARTRATE 50 MG
1 TABLET ORAL
Qty: 90 | Refills: 0
Start: 2020-11-23 | End: 2020-12-22

## 2020-11-23 RX ADMIN — TIOTROPIUM BROMIDE 1 CAPSULE(S): 18 CAPSULE ORAL; RESPIRATORY (INHALATION) at 09:27

## 2020-11-23 RX ADMIN — Medication 100 GRAM(S): at 06:25

## 2020-11-23 RX ADMIN — Medication 1 PATCH: at 11:20

## 2020-11-23 RX ADMIN — HEPARIN SODIUM 5000 UNIT(S): 5000 INJECTION INTRAVENOUS; SUBCUTANEOUS at 13:29

## 2020-11-23 RX ADMIN — LIDOCAINE 1 PATCH: 4 CREAM TOPICAL at 11:14

## 2020-11-23 RX ADMIN — Medication 0.5 MILLIGRAM(S): at 09:27

## 2020-11-23 RX ADMIN — Medication 1 PATCH: at 11:15

## 2020-11-23 RX ADMIN — Medication 5 MILLIGRAM(S): at 05:34

## 2020-11-23 RX ADMIN — HEPARIN SODIUM 5000 UNIT(S): 5000 INJECTION INTRAVENOUS; SUBCUTANEOUS at 05:34

## 2020-11-23 RX ADMIN — Medication 25 MILLIGRAM(S): at 13:30

## 2020-11-23 RX ADMIN — Medication 1 PATCH: at 06:13

## 2020-11-23 RX ADMIN — Medication 25 MILLIGRAM(S): at 05:34

## 2020-11-23 RX ADMIN — LIDOCAINE 1 PATCH: 4 CREAM TOPICAL at 00:00

## 2020-11-23 RX ADMIN — Medication 0.5 MILLIGRAM(S): at 00:05

## 2020-11-23 NOTE — DISCHARGE NOTE PROVIDER - NSDCACTIVITY_GEN_ALL_CORE
Walking - Outdoors allowed/Showering allowed/Walking - Indoors allowed/Do not drive or operate machinery/No heavy lifting/straining/Do not make important decisions/Stairs allowed

## 2020-11-23 NOTE — PROGRESS NOTE ADULT - SUBJECTIVE AND OBJECTIVE BOX
AVIVA VALENCIA      60y   Female   MRN-[id number]         No Known Allergies             Daily     Daily Drug Dosing Weight  Height (cm): 157.5 (20 Nov 2020 07:18)  Weight (kg): 51.3 (20 Nov 2020 07:18)  BMI (kg/m2): 20.7 (20 Nov 2020 07:18)  BSA (m2): 1.5 (20 Nov 2020 07:18)    HPI:  61 yo 30-pack year smoker with history of COPD presents to PST unit with other nonspecific abnormal finding of lung field scheduled for flexible bronchoscopy, left video assisted thoracoscopic surgery, lung resection with Dr. Zuleta. She reports abnormal ling finding on annual CT scan done as surveillance due to her smoking history.  (13 Nov 2020 14:23)      Procedure:  Uniportal left lower lobectomy    Issues:  Lung nodule  Postop pain  Chest tube status  COPD  Hx of smoking               Home Medications:  albuterol:  (20 Nov 2020 09:43)  busPIRone: orally 2 times a day (20 Nov 2020 09:43)  Calcium  1200+ d3 25mcg: orally once a day, Evening (20 Nov 2020 09:43)  Centrum oral tablet: 1 tab(s) orally once a day, Evening (20 Nov 2020 09:43)  Nicoderm: transdermal once a day (20 Nov 2020 09:43)  Trelegy: 100 microgram(s) inhaled once a day, AM (20 Nov 2020 09:43)  Vitamin D3 2000 intl units (50 mcg) oral tablet: orally every other day, Evening (20 Nov 2020 09:43)      PAST MEDICAL & SURGICAL HISTORY:  Other nonspecific abnormal finding of lung field    History of COPD    Osteoporosis    History of tonsillectomy    H/O ovarian cystectomy          Vital Signs Last 24 Hrs  T(C): 36 (23 Nov 2020 08:00), Max: 37.4 (22 Nov 2020 20:00)  T(F): 96.8 (23 Nov 2020 08:00), Max: 99.4 (22 Nov 2020 20:00)  HR: 105 (23 Nov 2020 08:00) (93 - 129)  BP: 108/61 (23 Nov 2020 08:00) (98/60 - 139/89)  BP(mean): 71 (23 Nov 2020 08:00) (65 - 101)  RR: 32 (23 Nov 2020 08:00) (17 - 32)  SpO2: 96% (23 Nov 2020 08:00) (93% - 99%)  I&O's Detail    22 Nov 2020 07:01  -  23 Nov 2020 07:00  --------------------------------------------------------  IN:    IV PiggyBack: 450 mL    Oral Fluid: 450 mL  Total IN: 900 mL    OUT:    Chest Tube (mL): 40 mL    Voided (mL): 2150 mL  Total OUT: 2190 mL    Total NET: -1290 mL      23 Nov 2020 07:01  -  23 Nov 2020 09:04  --------------------------------------------------------  IN:  Total IN: 0 mL    OUT:    Chest Tube (mL): 0 mL    Voided (mL): 150 mL  Total OUT: 150 mL    Total NET: -150 mL        CAPILLARY BLOOD GLUCOSE          Home Medications:  albuterol:  (20 Nov 2020 09:43)  busPIRone: orally 2 times a day (20 Nov 2020 09:43)  Calcium  1200+ d3 25mcg: orally once a day, Evening (20 Nov 2020 09:43)  Centrum oral tablet: 1 tab(s) orally once a day, Evening (20 Nov 2020 09:43)  Nicoderm: transdermal once a day (20 Nov 2020 09:43)  Trelegy: 100 microgram(s) inhaled once a day, AM (20 Nov 2020 09:43)  Vitamin D3 2000 intl units (50 mcg) oral tablet: orally every other day, Evening (20 Nov 2020 09:43)      MEDICATIONS  (STANDING):  acetaminophen  IVPB .. 1000 milliGRAM(s) IV Intermittent once  acetaminophen  IVPB .. 1000 milliGRAM(s) IV Intermittent once  acetaminophen  IVPB .. 1000 milliGRAM(s) IV Intermittent once  buDESOnide    Inhalation Suspension 0.5 milliGRAM(s) Inhalation two times a day  busPIRone 5 milliGRAM(s) Oral every 12 hours  heparin   Injectable 5000 Unit(s) SubCutaneous every 8 hours  lidocaine   Patch 1 Patch Transdermal daily  metoprolol tartrate 25 milliGRAM(s) Oral every 8 hours  nicotine - 21 mG/24Hr(s) Patch 1 patch Transdermal daily  tiotropium 18 MICROgram(s) Capsule 1 Capsule(s) Inhalation daily    MEDICATIONS  (PRN):  ketorolac   Injectable 15 milliGRAM(s) IV Push every 8 hours PRN Moderate Pain (4 - 6)  metoclopramide Injectable 10 milliGRAM(s) IV Push every 8 hours PRN nausea or vomiting not controlled by zofran  ondansetron Injectable 4 milliGRAM(s) IV Push every 8 hours PRN Nausea and/or Vomiting  senna 2 Tablet(s) Oral at bedtime PRN Constipation      Physical exam:                             General:               Pt is awake, alert,  appears to be in pain but not in  distress                                                  Neuro:                  Nonfocal                             Cardiovascular:   S1 & S2, regular                           Respiratory:         Air entry is fair and equal on both sides, has bilateral conducted sounds                           GI:                          Soft, nondistended and nontender, Bowel sounds active                            Ext:                        No cyanosis or edema     Labs:                                                                           11.9   9.61  )-----------( 337      ( 23 Nov 2020 02:41 )             35.2             11-23    131<L>  |  95<L>  |  7   ----------------------------<  116<H>  4.0   |  27  |  0.51    Ca    9.1      23 Nov 2020 02:41  Phos  2.6     11-23  Mg     2.1     11-23    TPro  6.2  /  Alb  3.3  /  TBili  0.2  /  DBili  x   /  AST  58<H>  /  ALT  30  /  AlkPhos  70  11-22                  PT/INR - ( 22 Nov 2020 03:20 )   PT: 12.4 SEC;   INR: 1.08          PTT - ( 22 Nov 2020 03:20 )  PTT:29.7 SEC  LIVER FUNCTIONS - ( 22 Nov 2020 10:44 )  Alb: 3.3 g/dL / Pro: 6.2 g/dL / ALK PHOS: 70 u/L / ALT: 30 u/L / AST: 58 u/L / GGT: x         Transcutaneous Bilirubin      CXR:  < from: Xray Chest 1 View- PORTABLE-Urgent (Xray Chest 1 View- PORTABLE-Urgent .) (11.22.20 @ 14:33) >  IMPRESSION: There is a left-sided chest tube in place. No left-sided pneumothorax is seen. There are bilateral small pleural effusions, unchanged.        Plan:    General: 60yFemale s/p  Uniportal left lower lobectomy , experiencing  pain with deep breathing.                             Neuro:                                         Pain control with Oxy /  Tylenol                             Cardiovascular:                                          Continue hemodynamic monitoring.    Sinus tachycardia - On Lopressor , continue for now. Will f/w PCP after discharge                            Respiratory:                                         Pt is on RA                                         Comfortable, not in any distress.                                         Encourage incentive spirometry                                          Monitor chest tube output                                         Chest tube to water seal. Chest tube to be d/cd.     Hx of smoking: Continue Nicoderm                                                                   COPD: Continue bronchodilators, pulmonary toilet                            GI                                         On regular diet as tolerated                                         Continue Zofran / Reglan for nausea - PRN	                                                                 Renal:                                         Monitor I/Os and electrolytes                                                                  Hem/ Onc:                                                                                  No  signs of bleeding.                                                                   Infectious disease:                                            Monitor for fever / leukocytosis.                                          All surgical incision / chest tube  sites look clean                            Endocrine                                            Continue Accu-Checks with coverage    Pt is on SQ Heparin and Venodyne boots for DVT prophylaxis.     Pertinent clinical, laboratory, radiographic, hemodynamic, echocardiographic, respiratory data, microbiologic data and chart were reviewed and analyzed frequently throughout the course of the day and night  Patient seen, examined and plan discussed with CT Surgeon Dr. Zuleta / CTICU team during rounds.    Status discussed with patient /   updated plan of care.           Coleman Elkins MD

## 2020-11-23 NOTE — DISCHARGE NOTE PROVIDER - NSDCQMPCI_CARD_ALL_CORE
[facility] [address]    [address] [facility] [place] [address]    Phone:  [phone]    Fax:  [phone]       Thank You for choosing us for your health care visit. We are glad to serve you and happy to provide you with this summary of your visit. Please help us to ensure we have accurate records. If you find anything that needs to be changed, please let our staff know as soon as possible.          Your Demographic Information     Patient Name Sex Quentin Meza Male 1984       Ethnic Group Patient Race    Not of  or  Origin White      Your Visit Details     Date & Time Provider Department    2017 10:00 AM Virgil Alvarez MD [facility] [place] [address]      Your Upcoming Appointment*(Max 10)     2017 10:40 AM CST   [facility] Berry MD   [facility]    [address]  [facility] [address]  [facility] [id number] [phone]            2017  1:00 PM CDT   RED EXTENDED FOLLOW-UP with Virgil Alvarez MD   [facility] [address] ([facility])    [address] [facility] [id number] [phone]              Your To Do List     Follow-Up    Return in about 2 months (around 2017).      We Ordered or Performed the Following     PSYTX PT W/E&M 30 MIN       Conditions Discussed Today or Order-Related Diagnoses        Comments    Uncomplicated opioid dependence    -  Primary       Your Vitals Were     Smoking Status                   Current Some Day Smoker           Medications Prescribed or Re-Ordered Today     buprenorphine-naLOXone (SUBOXONE) 8-2 MG sublingual film    Sig - Route: Place 1 each under the tongue 2 times daily. Indications: Opioid Dependence - Sublingual    Class: Normal    Pharmacy: [id number] - [facility] [place] [address] #: [phone]    QUEtiapine  (SEROQUEL) 100 MG tablet    Sig - Route: Take 1 tablet by mouth nightly. Indications: episodic mood disorder - Oral    Class: Eprescribe    Pharmacy: [facility] - [facility], [place] [id number] [address] Ph #: [phone]      Your Current Medications Are        Disp Refills Start End    buprenorphine-naLOXone (SUBOXONE) 8-2 MG sublingual film 60 each 1 2/24/2017     Sig - Route: Place 1 each under the tongue 2 times daily. Indications: Opioid Dependence - Sublingual    QUEtiapine (SEROQUEL) 100 MG tablet 30 tablet 3 2/24/2017     Sig - Route: Take 1 tablet by mouth nightly. Indications: episodic mood disorder - Oral    Class: Eprescribe    albuterol (PROVENTIL HFA) 108 (90 BASE) MCG/ACT inhaler 1 Inhaler 2 2/9/2017     Sig - Route: Inhale 2 puffs into the lungs every 4 hours as needed for Shortness of Breath or Wheezing. - Inhalation    Class: Eprescribe    amphetamine-dextroamphetamine (ADDERALL) 20 MG tablet 60 tablet 0 2/8/2017 3/10/2017    Sig - Route: Take 1 tablet by mouth 2 times daily. - Oral    albuterol (VENTOLIN) 108 (90 BASE) MCG/ACT inhaler        Sig - Route: Inhale 2 puffs into the lungs every 4 hours as needed.   - Inhalation    Class: Historical Med      Allergies     Cat Dander Other (See Comments)    Asthma attack    Dog Dander Other (See Comments)    Asthma attack    Pollen Other (See Comments)    Asthma attack      Immunizations History as of 2/24/2017     Name Date    INFLUENZA QUADRIVALENT 10/9/2015 11:45 AM    Influenza Quadrivalent Preservative Free 11/5/2016  8:40 AM    Pneumococcal Polysaccharide Adult 11/5/2016  8:40 AM    Tdap 10/9/2015 11:45 AM      Problem List as of 2/24/2017     Tobacco use            Patient Instructions     None       No

## 2020-11-23 NOTE — DISCHARGE NOTE PROVIDER - NSDCFUADDINST_GEN_ALL_CORE_FT
Call Dr. Valentin office [phone], or CALL [phone], if you have any increased shortness of breath, worsening chest pains, fever of 101oF or greater, or any other concerning symptom.    Continue incentive spirometry, and ambulate at least 4-5x daily.

## 2020-11-23 NOTE — DISCHARGE NOTE NURSING/CASE MANAGEMENT/SOCIAL WORK - NSDCFUADDAPPT_GEN_ALL_CORE_FT
Follow-up with Dr. Zuleta in 2 weeks [phone]  Left chest suture to be removed at follow-up appointment with Dr. Zuleta  Have CXR performed 1-2 days prior to follow-up with Dr. Zuleta    Follow-up with PMD within 1 week

## 2020-11-23 NOTE — DISCHARGE NOTE PROVIDER - HOSPITAL COURSE
59 y/o female w/ PMHx of COPD, former smoker, with a Left Lower Lobe lung mass.  She presented through Same-Day Surgery on 11/20/20, and underwent Left VATS, Left Lower Lobectomy and Left Upper Lobe Wedge Resection.  Postoperatively, she had an air-leak, that resolved, and her left chest tube was removed this morning.  CXR s/p chest tube removal with a decrease in previously seen small left apical pneumothorax as per official reading.  She also had some postoperative atrial fibrillation on POD#2, which resolved, and she is currently in sinus rhythm.  She denies any chest pains or SOB, and is hemodynamically stable.  Discussed with Dr. Zuleta this morning on multidisciplinary rounds, and she is to be discharged home today with Lopressor.

## 2020-11-23 NOTE — DIETITIAN INITIAL EVALUATION ADULT. - OTHER INFO
59 yo 30-pack year smoker with history of COPD presents  with  nonspecific abnormal finding of lung field , s/p flexible bronchoscopy, left video assisted thoracoscopic surgery, lung resection with Dr. Zuleta. Pt. tolerating PO diet with > 75% intake @ meals .

## 2020-11-23 NOTE — PROGRESS NOTE ADULT - SUBJECTIVE AND OBJECTIVE BOX
Left chest tube drained 40ml serosanguinous fluid over past 24hrs.  No air-leak.  CXR at 5:11am with no gross pneumothorax.  Removed earlier this morning after discussing with Dr. Zuleta at multidisciplinary rounds.

## 2020-11-23 NOTE — DIETITIAN INITIAL EVALUATION ADULT. - ORAL INTAKE PTA/DIET HISTORY
Pt. reports having good appetite, and PO nutrition prior to admission, no known food allergies, no recent wt. changes , no issues with PO nutrition was on Regular diet .

## 2020-11-23 NOTE — DISCHARGE NOTE PROVIDER - NSDCMRMEDTOKEN_GEN_ALL_CORE_FT
albuterol:   busPIRone: orally 2 times a day  Calcium  1200+ d3 25mcg: orally once a day, Evening  Centrum oral tablet: 1 tab(s) orally once a day, Evening  metoprolol tartrate 25 mg oral tablet: 1 tab(s) orally every 8 hours MDD:3 tabs  Nicoderm: transdermal once a day  oxyCODONE 5 mg oral tablet: 1 tab(s) orally every 4 to 6 hours, As Needed -for moderate pain MDD:6 tabs   senna oral tablet: 2 tab(s) orally once a day MDD:2 tabs  Trelegy: 100 microgram(s) inhaled once a day, AM  Vitamin D3 2000 intl units (50 mcg) oral tablet: orally every other day, Evening

## 2020-11-23 NOTE — DISCHARGE NOTE PROVIDER - CARE PROVIDER_API CALL
Arden Zuleta  SURGERY  [address] [place] [address], [place] [address]  Phone: [phone]  Fax: [phone]  Follow Up Time: 2 weeks

## 2020-11-23 NOTE — DISCHARGE NOTE NURSING/CASE MANAGEMENT/SOCIAL WORK - NSDCPNINST_GEN_ALL_CORE
Adjust and increase the patient's level of activity as per patient tolerance. If chest pain unrelieved by medication develops call 911. If a fever and/or shortness of breath develops, call PCP.

## 2020-11-23 NOTE — DISCHARGE NOTE PROVIDER - NSDCFUADDAPPT_GEN_ALL_CORE_FT
Follow-up with Dr. Zuleta in 2 weeks [phone]  Left chest suture to be removed at follow-up appointment with Dr. Zuleta  Have CXR performed 1-2 days prior to follow-up with Dr. Zuleta    Follow-up with PMD within 1 week

## 2020-11-23 NOTE — DISCHARGE NOTE NURSING/CASE MANAGEMENT/SOCIAL WORK - PATIENT PORTAL LINK FT
You can access the FollowMyHealth Patient Portal offered by [facility] by registering at the following website: [email or web address]. By joining [facility]’s FollowMyHealth portal, you will also be able to view your health information using other applications (apps) compatible with our system.

## 2020-12-01 PROBLEM — R91.8 OTHER NONSPECIFIC ABNORMAL FINDING OF LUNG FIELD: Chronic | Status: ACTIVE | Noted: 2020-11-13

## 2020-12-01 PROBLEM — M81.0 AGE-RELATED OSTEOPOROSIS WITHOUT CURRENT PATHOLOGICAL FRACTURE: Chronic | Status: ACTIVE | Noted: 2020-11-13

## 2020-12-01 PROBLEM — Z87.09 PERSONAL HISTORY OF OTHER DISEASES OF THE RESPIRATORY SYSTEM: Chronic | Status: ACTIVE | Noted: 2020-11-13

## 2020-12-02 LAB — SURGICAL PATHOLOGY STUDY: SIGNIFICANT CHANGE UP

## 2020-12-08 ENCOUNTER — TRANSCRIPTION ENCOUNTER (OUTPATIENT)
Age: 60
End: 2020-12-08

## 2020-12-08 ENCOUNTER — APPOINTMENT (OUTPATIENT)
Dept: THORACIC SURGERY | Facility: CLINIC | Age: 60
End: 2020-12-08
Payer: COMMERCIAL

## 2020-12-08 ENCOUNTER — APPOINTMENT (OUTPATIENT)
Dept: RADIOLOGY | Facility: HOSPITAL | Age: 60
End: 2020-12-08

## 2020-12-08 ENCOUNTER — RESULT REVIEW (OUTPATIENT)
Age: 60
End: 2020-12-08

## 2020-12-08 ENCOUNTER — OUTPATIENT (OUTPATIENT)
Dept: OUTPATIENT SERVICES | Facility: HOSPITAL | Age: 60
LOS: 1 days | End: 2020-12-08
Payer: COMMERCIAL

## 2020-12-08 VITALS
HEART RATE: 87 BPM | HEIGHT: 62 IN | DIASTOLIC BLOOD PRESSURE: 83 MMHG | TEMPERATURE: 97.8 F | OXYGEN SATURATION: 98 % | WEIGHT: 114 LBS | SYSTOLIC BLOOD PRESSURE: 120 MMHG | BODY MASS INDEX: 20.98 KG/M2

## 2020-12-08 DIAGNOSIS — Z98.890 OTHER SPECIFIED POSTPROCEDURAL STATES: Chronic | ICD-10-CM

## 2020-12-08 DIAGNOSIS — Z90.89 ACQUIRED ABSENCE OF OTHER ORGANS: Chronic | ICD-10-CM

## 2020-12-08 DIAGNOSIS — R91.8 OTHER NONSPECIFIC ABNORMAL FINDING OF LUNG FIELD: ICD-10-CM

## 2020-12-08 PROCEDURE — 71046 X-RAY EXAM CHEST 2 VIEWS: CPT | Mod: 26

## 2020-12-08 PROCEDURE — 99024 POSTOP FOLLOW-UP VISIT: CPT

## 2020-12-08 RX ORDER — METOPROLOL TARTRATE 25 MG/1
25 TABLET, FILM COATED ORAL
Refills: 0 | Status: ACTIVE | COMMUNITY

## 2021-02-02 ENCOUNTER — APPOINTMENT (OUTPATIENT)
Dept: THORACIC SURGERY | Facility: CLINIC | Age: 61
End: 2021-02-02
Payer: COMMERCIAL

## 2021-02-02 PROCEDURE — 99024 POSTOP FOLLOW-UP VISIT: CPT

## 2021-03-03 ENCOUNTER — APPOINTMENT (OUTPATIENT)
Dept: CT IMAGING | Facility: IMAGING CENTER | Age: 61
End: 2021-03-03
Payer: COMMERCIAL

## 2021-03-03 ENCOUNTER — OUTPATIENT (OUTPATIENT)
Dept: OUTPATIENT SERVICES | Facility: HOSPITAL | Age: 61
LOS: 1 days | End: 2021-03-03
Payer: COMMERCIAL

## 2021-03-03 DIAGNOSIS — Z98.890 OTHER SPECIFIED POSTPROCEDURAL STATES: Chronic | ICD-10-CM

## 2021-03-03 DIAGNOSIS — Z90.89 ACQUIRED ABSENCE OF OTHER ORGANS: Chronic | ICD-10-CM

## 2021-03-03 DIAGNOSIS — Z00.8 ENCOUNTER FOR OTHER GENERAL EXAMINATION: ICD-10-CM

## 2021-03-03 PROCEDURE — 71250 CT THORAX DX C-: CPT | Mod: 26

## 2021-03-03 PROCEDURE — 71250 CT THORAX DX C-: CPT

## 2021-03-09 ENCOUNTER — APPOINTMENT (OUTPATIENT)
Dept: THORACIC SURGERY | Facility: CLINIC | Age: 61
End: 2021-03-09
Payer: COMMERCIAL

## 2021-03-09 VITALS
WEIGHT: 130 LBS | HEART RATE: 84 BPM | HEIGHT: 63 IN | RESPIRATION RATE: 18 BRPM | SYSTOLIC BLOOD PRESSURE: 92 MMHG | BODY MASS INDEX: 23.04 KG/M2 | TEMPERATURE: 98.6 F | DIASTOLIC BLOOD PRESSURE: 59 MMHG | OXYGEN SATURATION: 100 %

## 2021-03-09 PROCEDURE — 99214 OFFICE O/P EST MOD 30 MIN: CPT

## 2021-03-09 PROCEDURE — 99072 ADDL SUPL MATRL&STAF TM PHE: CPT

## 2021-03-09 NOTE — PHYSICAL EXAM
[Normal Rate] : the respiratory rate was normal [Normal Rhythm/Effort] : normal respiratory rhythm and effort [Wheezing Bilaterally Apices] : wheezing was heard over both apices [Heart Rate And Rhythm] : heart rate was normal and rhythm regular [Heart Sounds] : normal S1 and S2 [Heart Sounds Gallop] : no gallops [Murmurs] : no murmurs [Heart Sounds Pericardial Friction Rub] : no pericardial rub [Examination Of The Chest] : the chest was normal in appearance [Chest Visual Inspection Thoracic Asymmetry] : no chest asymmetry [Diminished Respiratory Excursion] : normal chest expansion [No CVA Tenderness] : no ~M costovertebral angle tenderness [No Spinal Tenderness] : no spinal tenderness [Skin Color & Pigmentation] : normal skin color and pigmentation [Skin Turgor] : normal skin turgor [] : no rash [Oriented To Time, Place, And Person] : oriented to person, place, and time [Impaired Insight] : insight and judgment were intact [Affect] : the affect was normal [FreeTextEntry1] : proximal left forearm lump

## 2021-03-09 NOTE — ASSESSMENT
[FreeTextEntry1] : Ms. AVIVA VALENCIA, 60 year old female, former smoker (1/2 PPD x 30 years; Quit 10/2020), w/ hx of COPD, who presented to PCP for low dose CT lung cancer screening and was found to have new lung nodule. Initially presented to office in October, 2020 for surgical evaluation.\par \par Patient s/p Flex bronch, uniportal Lt VATS, LLL anatomic video-assisted thoracoscopic lobectomy, MLND, intercostal nerve block on 11/20/2021. Path of LLLobectomy revealing poorly differentiated squamous cell carcinoma; metastatic carcinoma; 3.7 cm , G3, + 1/7 hilar LN; All margins and remainder of lymph nodes (0/24) negative for carcinoma pT2a,pN1 (Stage IIB), EVG stain highlights the intact elastic visceral pleura. +p40, (-) TTF-1. path of MILLA wedge resection revealing lung parenchyma with emphysematous changes, negative for carcinoma.\par \par Patient was referred to Dr. Catarino Aguirre (Hem/Onc). She is s/p Cycle 3 chemotherapy, the last dose is at the end of 03/2021.\par \par CT chest on 3/3/21:\par - Post op changes\par - There is minimal pleural tethering along the left lateral chest wall. Attention on follow-up. \par - Emphsyema.\par \par I have reviewed the patient's medical records and diagnostic images at time of this office consultation and have made the following recommendation:\par 1. CT chest reviewed and explained to patient, I recommended patient to return to office in 3 months with CT Chest without contrast.\par 2. F/u with Dr. Aguirre. Instructed patient to f/u with Dr. Aguirre regarding the left forearm lump. \par \par I personally performed the services described in the documentation, reviewed the documentation recorded by the scribe in my presence and it accurately and completely records my words and actions.\par \par I, Meghan Crane, NP, am scribing for and the presence of NATHAN Lehman, the following sections HISTORY OF PRESENT ILLNESS, PAST MEDICAL/FAMILY/SOCIAL HISTORY; REVIEW OF SYSTEMS; VITAL SIGNS; PHYSICAL EXAM; DISPOSITION. \par

## 2021-03-09 NOTE — CONSULT LETTER
[Dear  ___] : Dear  [unfilled], [Courtesy Letter:] : I had the pleasure of seeing your patient, [unfilled], in my office today. [Please see my note below.] : Please see my note below. [Sincerely,] : Sincerely, [DrKrish  ___] : Dr. BHAGAT [FreeTextEntry2] : Dr. Freedom Fallon (Pulm/Ref) \par  Dr. Catarino Aguirre ([facility]) [FreeTextEntry3] : Arden Zuleta MD, FACS \par Chief, Division of Thoracic Surgery \par Director, Minimally Invasive Thoracic Surgery \par Department of Cardiovascular and Thoracic Surgery \par [facility] \par , Cardiovascular and Thoracic Surgery\par \par \par

## 2021-03-09 NOTE — DATA REVIEWED
[FreeTextEntry1] : CT chest on 3/3/21:\par - Post op changes\par - There is minimal pleural tethering along the left lateral chest wall. Attention on follow-up. \par - Emphsyema.\par

## 2021-03-09 NOTE — HISTORY OF PRESENT ILLNESS
[FreeTextEntry1] : Ms. AVIVA VALENCIA, 60 year old female, former smoker (1/2 PPD x 30 years; Quit 10/2020), w/ hx of COPD, who presented to PCP for low dose CT lung cancer screening and was found to have new lung nodule. Initially presented to office in October, 2020 for surgical evaluation.\par \par Patient s/p Flex bronch, uniportal Lt VATS, LLL anatomic video-assisted thoracoscopic lobectomy, MLND, intercostal nerve block on 11/20/2021. Path of LLLobectomy revealing poorly differentiated squamous cell carcinoma; metastatic carcinoma; 3.7 cm , G3, + 1/7 hilar LN; All margins and remainder of lymph nodes (0/24) negative for carcinoma pT2a,pN1 (Stage IIB), EVG stain highlights the intact elastic visceral pleura. +p40, (-) TTF-1. path of MILLA wedge resection revealing lung parenchyma with emphysematous changes, negative for carcinoma.\par \par Patient was referred to Dr. Catarino Aguirre (Hem/Onc). She is s/p Cycle 3 chemotherapy, the last dose is at the end of 03/2021.\par \par CT chest on 3/3/21:\par - Post op changes\par - There is minimal pleural tethering along the left lateral chest wall. Attention on follow-up. \par - Emphsyema.\par \par Patient is here today for a follow up. Patient c/o SOB on exertion r/t pain, c/o lump at proximal left forearm, soreness on palpation, started a week ago, stable size, denies shortness of breath, cough, chest pain, fever, chills.

## 2021-04-15 ENCOUNTER — APPOINTMENT (OUTPATIENT)
Dept: THORACIC SURGERY | Facility: CLINIC | Age: 61
End: 2021-04-15
Payer: COMMERCIAL

## 2021-04-15 PROCEDURE — 99443: CPT

## 2021-04-15 NOTE — REASON FOR VISIT
[Follow-Up: _____] : a [unfilled] follow-up visit [FreeTextEntry1] : [facility] FOLLOW UP

## 2021-04-15 NOTE — ASSESSMENT
[FreeTextEntry1] : Ms. AVIVA VALENCIA, 60 year old female, former smoker (1/2 PPD x 30 years; Quit 10/2020), w/ hx of COPD, who presented to PCP for low dose CT lung cancer screening and was found to have new lung nodule. Initially presented to office in October, 2020 for surgical evaluation.\par \par Patient s/p Flex bronch, uniportal Lt VATS, LLL anatomic video-assisted thoracoscopic lobectomy, MLND, intercostal nerve block on 11/20/2021. Path of LLLobectomy revealing poorly differentiated squamous cell carcinoma; metastatic carcinoma; 3.7 cm , G3, + 1/7 hilar LN; All margins and remainder of lymph nodes (0/24) negative for carcinoma pT2a,pN1 (Stage IIB), EVG stain highlights the intact elastic visceral pleura. +p40, (-) TTF-1. path of MILLA wedge resection revealing lung parenchyma with emphysematous changes, negative for carcinoma.\par \par Patient was referred to Dr. Catarino Aguirre (Hem/Onc). She is s/p Cycle 3 chemotherapy, the last dose is at the end of 03/2021.\par \par Patient presents today via telephonic visit for [facility] follow up. States that she is doing well. Patient denies worsening SOB, chest pain, cough, hemoptysis, fever, chills, night sweats, lightheadedness or dizziness.\par \par - RTC in June with f/u CT Chest as recommended. \par Recommendations reviewed with patient during this office visit, and all questions answered; Patient instructed on the importance of follow up and verbalizes understanding.\par \par I personally performed the services described in the documentation, reviewed the documentation recorded by the scribe in my presence and it accurately and completely records my words and actions.\par \par I, JIGNA ColonC, am scribing for and the presence of NATHAN Lehman, the following sections HISTORY OF PRESENT ILLNESS, PAST MEDICAL/FAMILY/SOCIAL HISTORY; REVIEW OF SYSTEMS; VITAL SIGNS; PHYSICAL EXAM; DISPOSITION.\par \par \par

## 2021-04-15 NOTE — HISTORY OF PRESENT ILLNESS
[Home] : at home, [unfilled] , at the time of the visit. [Medical Office: ([facility])___] : at the medical office located in  [Verbal consent obtained from patient] : the patient, [unfilled] [FreeTextEntry1] : Ms. VAIVA VALENCIA, 60 year old female, former smoker (1/2 PPD x 30 years; Quit 10/2020), w/ hx of COPD, who presented to PCP for low dose CT lung cancer screening and was found to have new lung nodule. Initially presented to office in October, 2020 for surgical evaluation.\par \par Patient s/p Flex bronch, uniportal Lt VATS, LLL anatomic video-assisted thoracoscopic lobectomy, MLND, intercostal nerve block on 11/20/2021. Path of LLLobectomy revealing poorly differentiated squamous cell carcinoma; metastatic carcinoma; 3.7 cm , G3, + 1/7 hilar LN; All margins and remainder of lymph nodes (0/24) negative for carcinoma pT2a,pN1 (Stage IIB), EVG stain highlights the intact elastic visceral pleura. +p40, (-) TTF-1. path of MILLA wedge resection revealing lung parenchyma with emphysematous changes, negative for carcinoma.\par \par Patient was referred to Dr. Catarino Aguirre (Hem/Onc). She is s/p Cycle 3 chemotherapy, the last dose is at the end of 03/2021.\par \par Patient presents today via telephonic visit for [facility] follow up.

## 2021-04-21 ENCOUNTER — APPOINTMENT (OUTPATIENT)
Dept: CT IMAGING | Facility: IMAGING CENTER | Age: 61
End: 2021-04-21
Payer: COMMERCIAL

## 2021-04-21 ENCOUNTER — OUTPATIENT (OUTPATIENT)
Dept: OUTPATIENT SERVICES | Facility: HOSPITAL | Age: 61
LOS: 1 days | End: 2021-04-21
Payer: COMMERCIAL

## 2021-04-21 DIAGNOSIS — Z90.89 ACQUIRED ABSENCE OF OTHER ORGANS: Chronic | ICD-10-CM

## 2021-04-21 DIAGNOSIS — Z00.8 ENCOUNTER FOR OTHER GENERAL EXAMINATION: ICD-10-CM

## 2021-04-21 DIAGNOSIS — Z98.890 OTHER SPECIFIED POSTPROCEDURAL STATES: Chronic | ICD-10-CM

## 2021-04-21 PROCEDURE — 74176 CT ABD & PELVIS W/O CONTRAST: CPT

## 2021-04-21 PROCEDURE — 74176 CT ABD & PELVIS W/O CONTRAST: CPT | Mod: 26

## 2021-04-29 NOTE — ASU PATIENT PROFILE, ADULT - AS SC BRADEN MOBILITY
(4) no limitation Drysol Pregnancy And Lactation Text: This medication is considered safe during pregnancy and breast feeding.

## 2021-06-03 ENCOUNTER — OUTPATIENT (OUTPATIENT)
Dept: OUTPATIENT SERVICES | Facility: HOSPITAL | Age: 61
LOS: 1 days | End: 2021-06-03
Payer: COMMERCIAL

## 2021-06-03 ENCOUNTER — APPOINTMENT (OUTPATIENT)
Dept: CT IMAGING | Facility: IMAGING CENTER | Age: 61
End: 2021-06-03
Payer: COMMERCIAL

## 2021-06-03 DIAGNOSIS — Z90.89 ACQUIRED ABSENCE OF OTHER ORGANS: Chronic | ICD-10-CM

## 2021-06-03 DIAGNOSIS — Z00.8 ENCOUNTER FOR OTHER GENERAL EXAMINATION: ICD-10-CM

## 2021-06-03 DIAGNOSIS — C34.90 MALIGNANT NEOPLASM OF UNSPECIFIED PART OF UNSPECIFIED BRONCHUS OR LUNG: ICD-10-CM

## 2021-06-03 DIAGNOSIS — Z98.890 OTHER SPECIFIED POSTPROCEDURAL STATES: Chronic | ICD-10-CM

## 2021-06-03 PROCEDURE — 71250 CT THORAX DX C-: CPT

## 2021-06-03 PROCEDURE — 71250 CT THORAX DX C-: CPT | Mod: 26

## 2021-06-08 ENCOUNTER — NON-APPOINTMENT (OUTPATIENT)
Age: 61
End: 2021-06-08

## 2021-06-08 ENCOUNTER — APPOINTMENT (OUTPATIENT)
Dept: THORACIC SURGERY | Facility: CLINIC | Age: 61
End: 2021-06-08
Payer: COMMERCIAL

## 2021-06-08 VITALS
OXYGEN SATURATION: 96 % | DIASTOLIC BLOOD PRESSURE: 67 MMHG | SYSTOLIC BLOOD PRESSURE: 101 MMHG | BODY MASS INDEX: 23.04 KG/M2 | HEART RATE: 87 BPM | WEIGHT: 130 LBS | HEIGHT: 63 IN | TEMPERATURE: 98.1 F

## 2021-06-08 DIAGNOSIS — Z23 ENCOUNTER FOR IMMUNIZATION: ICD-10-CM

## 2021-06-08 PROCEDURE — 99214 OFFICE O/P EST MOD 30 MIN: CPT

## 2021-06-08 PROCEDURE — 99072 ADDL SUPL MATRL&STAF TM PHE: CPT

## 2021-06-08 RX ORDER — BUSPIRONE HYDROCHLORIDE 7.5 MG/1
TABLET ORAL
Refills: 0 | Status: ACTIVE | COMMUNITY

## 2021-06-08 NOTE — PHYSICAL EXAM
[] : no respiratory distress [Respiration, Rhythm And Depth] : normal respiratory rhythm and effort [Exaggerated Use Of Accessory Muscles For Inspiration] : no accessory muscle use [Auscultation Breath Sounds / Voice Sounds] : lungs were clear to auscultation bilaterally [Examination Of The Chest] : the chest was normal in appearance [Chest Visual Inspection Thoracic Asymmetry] : no chest asymmetry [Diminished Respiratory Excursion] : normal chest expansion [Oriented To Time, Place, And Person] : oriented to person, place, and time [Abnormal Walk] : normal gait [Impaired Insight] : insight and judgment were intact [Affect] : the affect was normal [Mood] : the mood was normal

## 2021-06-08 NOTE — ASSESSMENT
[FreeTextEntry1] : Ms. AVIVA VALENCIA, 60 year old female, former smoker (1/2 PPD x 30 years; Quit 10/2020), w/ hx of COPD, who presented to PCP for low dose CT lung cancer screening and was found to have new lung nodule. Initially presented to office in October, 2020 for surgical evaluation.\par \par Patient s/p Flex bronch, uniportal Lt VATS, LLL anatomic video-assisted thoracoscopic lobectomy, MLND, intercostal nerve block on 11/20/2020. Path of LLLobectomy revealing poorly differentiated squamous cell carcinoma; metastatic carcinoma; 3.7 cm , G3, + 1/7 hilar LN; All margins and remainder of lymph nodes (0/24) negative for carcinoma pT2a,pN1 (Stage IIB), EVG stain highlights the intact elastic visceral pleura. +p40, (-) TTF-1. path of MILLA wedge resection revealing lung parenchyma with emphysematous changes, negative for carcinoma.\par \par CT chest on 3/3/21:\par - Post op changes\par - There is minimal pleural tethering along the left lateral chest wall. Attention on follow-up. \par - Emphsyema.\par \par Patient was referred to Dr. Catarino Aguirre (Hem/Onc). Completed chemotherapy end of March, 2021. S/p 4  Cycles\par \par I have independently reviewed the medical records and imaging at the time of this office consultation, and discussed the following interpretations and recommendations with the patient:\par - CT scan showed no evidence of recurrence, recommended patient to return to office in 3 months with CT chest, no contrast.\par Recommendations reviewed with patient during this office visit, and all questions answered; Patient instructed on the importance of follow up and verbalizes understanding.\par \par I personally performed the services described in the documentation, reviewed the documentation recorded by the scribe in my presence and it accurately and completely records my words and actions.\par \par I, ALFONZO Colon-C, am scribing for and the presence of NATHAN Lehman, the following sections HISTORY OF PRESENT ILLNESS, PAST MEDICAL/FAMILY/SOCIAL HISTORY; REVIEW OF SYSTEMS; VITAL SIGNS; PHYSICAL EXAM; DISPOSITION.\par \par

## 2021-06-08 NOTE — CONSULT LETTER
[Dear  ___] : Dear  [unfilled], [Courtesy Letter:] : I had the pleasure of seeing your patient, [unfilled], in my office today. [Please see my note below.] : Please see my note below. [Sincerely,] : Sincerely, [DrKrish  ___] : Dr. BHAGAT [FreeTextEntry2] : Dr. Freedom Fallon (Pulm/Ref) \par  Dr. Catarino Aguirre ([facility])  [FreeTextEntry3] : Arden Zuleta MD, FACS \par Chief, Division of Thoracic Surgery \par Director, Minimally Invasive Thoracic Surgery \par Department of Cardiovascular and Thoracic Surgery \par [facility] \par , Cardiovascular and Thoracic Surgery\par \par \par

## 2021-06-08 NOTE — DATA REVIEWED
[FreeTextEntry1] : Independently reviewed the following imaging:\par - CT chest on 3/3/21\par - CT chest on 6/3/21

## 2021-06-08 NOTE — HISTORY OF PRESENT ILLNESS
[FreeTextEntry1] : Ms. AVIVA VALENCIA, 60 year old female, former smoker (1/2 PPD x 30 years; Quit 10/2020), w/ hx of COPD, who presented to PCP for low dose CT lung cancer screening and was found to have new lung nodule. Initially presented to office in October, 2020 for surgical evaluation.\par \par Patient s/p Flex bronch, uniportal Lt VATS, LLL anatomic video-assisted thoracoscopic lobectomy, MLND, intercostal nerve block on 11/20/2020. Path of LLLobectomy revealing poorly differentiated squamous cell carcinoma; metastatic carcinoma; 3.7 cm , G3, + 1/7 hilar LN; All margins and remainder of lymph nodes (0/24) negative for carcinoma pT2a,pN1 (Stage IIB), EVG stain highlights the intact elastic visceral pleura. +p40, (-) TTF-1. path of MILLA wedge resection revealing lung parenchyma with emphysematous changes, negative for carcinoma.\par \par CT chest on 3/3/21:\par - Post op changes\par - There is minimal pleural tethering along the left lateral chest wall. Attention on follow-up. \par - Emphsyema.\par \par Patient was referred to Dr. Catarino Aguirre (Hem/Onc). Completed chemotherapy end of March, 2021. S/p 4  Cycles\par \par CT chest on 6/3/21:\par - s/p LLLobectomy and a MILLA wedge resection.\par - Unchanged, subtle area of pleural thickening adjacent to the lateral fifth rib\par - Unchanged, hypodense thyroid nodule with a peripheral calcification in right lobe measure less than 10 mm \par - Emphysema\par \par Patient presents to office for follow up. Patient denies worsening SOB, chest pain, cough, hemoptysis, fever, chills, night sweats, lightheadedness or dizziness.\par

## 2021-09-08 ENCOUNTER — OUTPATIENT (OUTPATIENT)
Dept: OUTPATIENT SERVICES | Facility: HOSPITAL | Age: 61
LOS: 1 days | End: 2021-09-08
Payer: COMMERCIAL

## 2021-09-08 ENCOUNTER — APPOINTMENT (OUTPATIENT)
Dept: CT IMAGING | Facility: IMAGING CENTER | Age: 61
End: 2021-09-08

## 2021-09-08 DIAGNOSIS — Z98.890 OTHER SPECIFIED POSTPROCEDURAL STATES: Chronic | ICD-10-CM

## 2021-09-08 DIAGNOSIS — C34.90 MALIGNANT NEOPLASM OF UNSPECIFIED PART OF UNSPECIFIED BRONCHUS OR LUNG: ICD-10-CM

## 2021-09-08 DIAGNOSIS — Z00.8 ENCOUNTER FOR OTHER GENERAL EXAMINATION: ICD-10-CM

## 2021-09-08 DIAGNOSIS — Z90.89 ACQUIRED ABSENCE OF OTHER ORGANS: Chronic | ICD-10-CM

## 2021-09-08 PROCEDURE — 71250 CT THORAX DX C-: CPT | Mod: 26

## 2021-09-08 PROCEDURE — 71250 CT THORAX DX C-: CPT

## 2021-09-14 ENCOUNTER — APPOINTMENT (OUTPATIENT)
Dept: THORACIC SURGERY | Facility: CLINIC | Age: 61
End: 2021-09-14
Payer: COMMERCIAL

## 2021-09-14 VITALS
WEIGHT: 135 LBS | OXYGEN SATURATION: 98 % | HEIGHT: 63 IN | SYSTOLIC BLOOD PRESSURE: 109 MMHG | HEART RATE: 106 BPM | BODY MASS INDEX: 23.92 KG/M2 | RESPIRATION RATE: 18 BRPM | TEMPERATURE: 98.2 F | DIASTOLIC BLOOD PRESSURE: 74 MMHG

## 2021-09-14 PROCEDURE — 99214 OFFICE O/P EST MOD 30 MIN: CPT

## 2021-09-14 NOTE — HISTORY OF PRESENT ILLNESS
[FreeTextEntry1] : Ms. AVIVA VALENCIA, 61 year old female, former smoker (1/2 PPD x 30 years; Quit 10/2020), w/ hx of COPD, who presented to PCP for low dose CT lung cancer screening and was found to have new lung nodule. Initially presented to office in October, 2020 for surgical evaluation.\par \par Patient s/p Flex bronch, uniportal Lt VATS, LLL anatomic video-assisted thoracoscopic lobectomy, MLND, intercostal nerve block on 11/20/2020. Path of LLLobectomy revealing poorly differentiated squamous cell carcinoma; metastatic carcinoma; 3.7 cm , G3, + 1/7 hilar LN; All margins and remainder of lymph nodes (0/24) negative for carcinoma pT2a,pN1 (Stage IIB), EVG stain highlights the intact elastic visceral pleura. +p40, (-) TTF-1. path of MILLA wedge resection revealing lung parenchyma with emphysematous changes, negative for carcinoma.\par \par CT chest on 3/3/21:\par - Post op changes\par - There is minimal pleural tethering along the left lateral chest wall. Attention on follow-up. \par - Emphsyema.\par \par Patient was referred to Dr. Catarino Aguirre (Hem/Onc). Completed chemotherapy end of March, 2021. S/p 4  Cycles\par \par CT chest on 6/3/21:\par - s/p LLLobectomy and a MILLA wedge resection.\par - Unchanged, subtle area of pleural thickening adjacent to the lateral fifth rib\par - Unchanged, hypodense thyroid nodule with a peripheral calcification in right lobe measure less than 10 mm \par - Emphysema\par \par CT chest on 9/8/21:\par - Stable, post op changes\par \par Patient presents to office for follow up. Denies worsening SOB, chest pain, cough, hemoptysis, fever, chills, night sweats, lightheadedness or dizziness.\par

## 2021-09-14 NOTE — CONSULT LETTER
[Dear  ___] : Dear  [unfilled], [Courtesy Letter:] : I had the pleasure of seeing your patient, [unfilled], in my office today. [Please see my note below.] : Please see my note below. [Sincerely,] : Sincerely, [FreeTextEntry2] : Dr. Freedom Fallon (Pulm/Ref) \par  Dr. Catarino Aguirre ([facility])  [FreeTextEntry3] : Arden Zuleta MD, FACS \par Chief, Division of Thoracic Surgery \par Director, Minimally Invasive Thoracic Surgery \par Department of Cardiovascular and Thoracic Surgery \par [facility] \par , Cardiovascular and Thoracic Surgery\par \par \par

## 2021-09-14 NOTE — ASSESSMENT
[FreeTextEntry1] : Ms. AVIVA VALENCIA, 61 year old female, former smoker (1/2 PPD x 30 years; Quit 10/2020), w/ hx of COPD, who presented to PCP for low dose CT lung cancer screening and was found to have new lung nodule. Initially presented to office in October, 2020 for surgical evaluation.\par \par Patient s/p Flex bronch, uniportal Lt VATS, LLL anatomic video-assisted thoracoscopic lobectomy, MLND, intercostal nerve block on 11/20/2020. Path of LLLobectomy revealing poorly differentiated squamous cell carcinoma; metastatic carcinoma; 3.7 cm , G3, + 1/7 hilar LN; All margins and remainder of lymph nodes (0/24) negative for carcinoma pT2a,pN1 (Stage IIB), EVG stain highlights the intact elastic visceral pleura. +p40, (-) TTF-1. path of MILLA wedge resection revealing lung parenchyma with emphysematous changes, negative for carcinoma.\par \par Patient was referred to Dr. Catarino Aguirre (Hem/Onc). Completed chemotherapy end of March, 2021. S/p 4  Cycles\par \par I have independently reviewed the medical records and imaging at the time of this office consultation, and discussed the following interpretations and recommendations with the patient:\par - CT scan stable. Recommended for patient to return to clinic in 3 months with repeat CT chest, no contrast. \par \par Recommendations reviewed with patient during this office visit, and all questions answered; Patient instructed on the importance of follow up and verbalizes understanding.\par \par

## 2021-09-14 NOTE — PHYSICAL EXAM
[] : no respiratory distress [Respiration, Rhythm And Depth] : normal respiratory rhythm and effort [Exaggerated Use Of Accessory Muscles For Inspiration] : no accessory muscle use [Auscultation Breath Sounds / Voice Sounds] : lungs were clear to auscultation bilaterally [Examination Of The Chest] : the chest was normal in appearance [Chest Visual Inspection Thoracic Asymmetry] : no chest asymmetry [Diminished Respiratory Excursion] : normal chest expansion [Abnormal Walk] : normal gait [Skin Color & Pigmentation] : normal skin color and pigmentation [Oriented To Time, Place, And Person] : oriented to person, place, and time [Impaired Insight] : insight and judgment were intact [Affect] : the affect was normal [Mood] : the mood was normal [Memory Recent] : recent memory was not impaired [Memory Remote] : remote memory was not impaired

## 2021-12-08 ENCOUNTER — OUTPATIENT (OUTPATIENT)
Dept: OUTPATIENT SERVICES | Facility: HOSPITAL | Age: 61
LOS: 1 days | End: 2021-12-08
Payer: COMMERCIAL

## 2021-12-08 ENCOUNTER — APPOINTMENT (OUTPATIENT)
Dept: CT IMAGING | Facility: IMAGING CENTER | Age: 61
End: 2021-12-08
Payer: COMMERCIAL

## 2021-12-08 DIAGNOSIS — C34.90 MALIGNANT NEOPLASM OF UNSPECIFIED PART OF UNSPECIFIED BRONCHUS OR LUNG: ICD-10-CM

## 2021-12-08 DIAGNOSIS — Z98.890 OTHER SPECIFIED POSTPROCEDURAL STATES: Chronic | ICD-10-CM

## 2021-12-08 DIAGNOSIS — Z90.89 ACQUIRED ABSENCE OF OTHER ORGANS: Chronic | ICD-10-CM

## 2021-12-08 PROCEDURE — 74176 CT ABD & PELVIS W/O CONTRAST: CPT | Mod: 26

## 2021-12-08 PROCEDURE — 71250 CT THORAX DX C-: CPT | Mod: 26

## 2021-12-08 PROCEDURE — 74176 CT ABD & PELVIS W/O CONTRAST: CPT

## 2021-12-08 PROCEDURE — 71250 CT THORAX DX C-: CPT

## 2021-12-14 ENCOUNTER — APPOINTMENT (OUTPATIENT)
Dept: THORACIC SURGERY | Facility: CLINIC | Age: 61
End: 2021-12-14
Payer: COMMERCIAL

## 2021-12-14 VITALS
HEIGHT: 63 IN | HEART RATE: 82 BPM | BODY MASS INDEX: 27.82 KG/M2 | RESPIRATION RATE: 18 BRPM | WEIGHT: 157 LBS | OXYGEN SATURATION: 95 % | DIASTOLIC BLOOD PRESSURE: 82 MMHG | SYSTOLIC BLOOD PRESSURE: 143 MMHG

## 2021-12-14 PROCEDURE — 99214 OFFICE O/P EST MOD 30 MIN: CPT

## 2021-12-17 NOTE — ASSESSMENT
[FreeTextEntry1] : Ms. AVIVA VALENCIA, 61 year old female, former smoker (1/2 PPD x 30 years; Quit 10/2020), w/ hx of COPD, who presented to PCP for low dose CT lung cancer screening and was found to have new lung nodule. Initially presented to office in October, 2020 for surgical evaluation.\par \par Patient s/p Flex bronch, uniportal Lt VATS, LLL anatomic video-assisted thoracoscopic lobectomy, MLND, intercostal nerve block on 11/20/2020. Path of LLLobectomy revealing poorly differentiated squamous cell carcinoma; metastatic carcinoma; 3.7 cm , G3, + 1/7 hilar LN; All margins and remainder of lymph nodes (0/24) negative for carcinoma pT2a,pN1 (Stage IIB), EVG stain highlights the intact elastic visceral pleura. +p40, (-) TTF-1. path of MILLA wedge resection revealing lung parenchyma with emphysematous changes, negative for carcinoma.\par \par Patient was referred to Dr. Catarino Aguirre (Hem/Onc). Completed chemotherapy end of March, 2021. S/p 4  Cycles\par \par I have independently reviewed the medical records and imaging at the time of this office consultation, and discussed the following interpretations and recommendations with the patient:\par - CT scan stable. Recommended for patient to return to clinic in 3 months with repeat CT chest, no contrast. \par \par Recommendations reviewed with patient during this office visit, and all questions answered; Patient instructed on the importance of follow up and verbalizes understanding.\par \par I personally performed the services described in the documentation, reviewed the documentation recorded by the scribe in my presence and it accurately and completely records my words and actions.\par \par I, Odalys Sheikh, ANP-C, am scribing for and the presence of NATHAN Lehman, the following sections HISTORY OF PRESENT ILLNESS, PAST MEDICAL/FAMILY/SOCIAL HISTORY; REVIEW OF SYSTEMS; VITAL SIGNS; PHYSICAL EXAM; DISPOSITION.\par \par \par

## 2021-12-17 NOTE — HISTORY OF PRESENT ILLNESS
[FreeTextEntry1] : Ms. AVIVA VALENCIA, 61 year old female, former smoker (1/2 PPD x 30 years; Quit 10/2020), w/ hx of COPD, who presented to PCP for low dose CT lung cancer screening and was found to have new lung nodule. Initially presented to office in October, 2020 for surgical evaluation.\par \par Patient is 1 year s/p Flex bronch, uniportal Lt VATS, LLL anatomic video-assisted thoracoscopic lobectomy, MLND, intercostal nerve block on 11/20/2020. Path of LLLobectomy revealing poorly differentiated squamous cell carcinoma; metastatic carcinoma; 3.7 cm , G3, + 1/7 hilar LN; All margins and remainder of lymph nodes (0/24) negative for carcinoma pT2a,pN1 (Stage IIB), EVG stain highlights the intact elastic visceral pleura. +p40, (-) TTF-1. path of MILLA wedge resection revealing lung parenchyma with emphysematous changes, negative for carcinoma.\par \par CT chest on 3/3/21:\par - Post op changes\par - There is minimal pleural tethering along the left lateral chest wall. Attention on follow-up. \par - Emphsyema.\par \par Patient was referred to Dr. Catarino Aguirre (Hem/Onc). Completed chemotherapy end of March, 2021. S/p 4  Cycles\par \par CT chest on 6/3/21:\par - s/p LLLobectomy and a MILLA wedge resection.\par - Unchanged, subtle area of pleural thickening adjacent to the lateral fifth rib\par - Unchanged, hypodense thyroid nodule with a peripheral calcification in right lobe measure less than 10 mm \par - Emphysema\par \par CT chest on 9/8/21:\par - Stable, post op changes\par \par CT Chest/Abdo on 12/8/21: \par - Post op changes\par - Stable partially calcified 1.5 cm nodule in the right hemithyroid. \par - No evidence of recurrence or metastasis.\par \par Patient presents to office for follow up. Continues follow up with Dr. Catarino Aguirre. Overall, she is feeling well. Today, patient denies worsening SOB, chest pain, cough, hemoptysis, fever, chills, night sweats, lightheadedness or dizziness.\par

## 2021-12-17 NOTE — PHYSICAL EXAM
[Sclera] : the sclera and conjunctiva were normal [PERRL With Normal Accommodation] : pupils were equal in size, round, and reactive to light [Extraocular Movements] : extraocular movements were intact [Neck Appearance] : the appearance of the neck was normal [Jugular Venous Distention Increased] : there was no jugular-venous distention [Respiration, Rhythm And Depth] : normal respiratory rhythm and effort [Exaggerated Use Of Accessory Muscles For Inspiration] : no accessory muscle use [Auscultation Breath Sounds / Voice Sounds] : lungs were clear to auscultation bilaterally [Heart Rate And Rhythm] : heart rate was normal and rhythm regular [Examination Of The Chest] : the chest was normal in appearance [Chest Visual Inspection Thoracic Asymmetry] : no chest asymmetry [Diminished Respiratory Excursion] : normal chest expansion [2+] : left 2+ [Breast Appearance] : normal in appearance [Breast Palpation Mass] : no palpable masses [Cervical Lymph Nodes Enlarged Posterior Bilaterally] : posterior cervical [Cervical Lymph Nodes Enlarged Anterior Bilaterally] : anterior cervical [Supraclavicular Lymph Nodes Enlarged Bilaterally] : supraclavicular [Abnormal Walk] : normal gait [Nail Clubbing] : no clubbing  or cyanosis of the fingernails [Involuntary Movements] : no involuntary movements were seen [Musculoskeletal - Swelling] : no joint swelling seen [Motor Tone] : muscle strength and tone were normal [Skin Color & Pigmentation] : normal skin color and pigmentation [Skin Turgor] : normal skin turgor [] : no rash [Oriented To Time, Place, And Person] : oriented to person, place, and time [Right Carotid Bruit] : no bruit heard over the right carotid [Left Carotid Bruit] : no bruit heard over the left carotid [Right Femoral Bruit] : no bruit heard over the right femoral artery [Left Femoral Bruit] : no bruit heard over the left femoral artery [FreeTextEntry1] : Well healed surgical incisions

## 2022-03-01 ENCOUNTER — OUTPATIENT (OUTPATIENT)
Dept: OUTPATIENT SERVICES | Facility: HOSPITAL | Age: 62
LOS: 1 days | End: 2022-03-01
Payer: COMMERCIAL

## 2022-03-01 ENCOUNTER — APPOINTMENT (OUTPATIENT)
Dept: CT IMAGING | Facility: IMAGING CENTER | Age: 62
End: 2022-03-01
Payer: COMMERCIAL

## 2022-03-01 DIAGNOSIS — Z90.89 ACQUIRED ABSENCE OF OTHER ORGANS: Chronic | ICD-10-CM

## 2022-03-01 DIAGNOSIS — Z98.890 OTHER SPECIFIED POSTPROCEDURAL STATES: Chronic | ICD-10-CM

## 2022-03-01 DIAGNOSIS — C34.90 MALIGNANT NEOPLASM OF UNSPECIFIED PART OF UNSPECIFIED BRONCHUS OR LUNG: ICD-10-CM

## 2022-03-01 PROCEDURE — 71250 CT THORAX DX C-: CPT

## 2022-03-01 PROCEDURE — 71250 CT THORAX DX C-: CPT | Mod: 26

## 2022-03-08 ENCOUNTER — APPOINTMENT (OUTPATIENT)
Dept: THORACIC SURGERY | Facility: CLINIC | Age: 62
End: 2022-03-08
Payer: COMMERCIAL

## 2022-03-08 VITALS
HEART RATE: 67 BPM | DIASTOLIC BLOOD PRESSURE: 80 MMHG | OXYGEN SATURATION: 100 % | BODY MASS INDEX: 28.35 KG/M2 | WEIGHT: 160 LBS | SYSTOLIC BLOOD PRESSURE: 126 MMHG | HEIGHT: 63 IN

## 2022-03-08 PROCEDURE — 99214 OFFICE O/P EST MOD 30 MIN: CPT

## 2022-03-08 NOTE — HISTORY OF PRESENT ILLNESS
[FreeTextEntry1] : Ms. AVIVA VALENCIA, 61 year old female, former smoker (1/2 PPD x 30 years; Quit 10/2020), w/ hx of COPD, who presented to PCP for low dose CT lung cancer screening and was found to have new lung nodule. Initially presented to office in October, 2020 for surgical evaluation.\par \par Patient is 1 year s/p Flex bronch, uniportal Lt VATS, LLL anatomic video-assisted thoracoscopic lobectomy, MLND, intercostal nerve block on 11/20/2020. Path of LLLobectomy revealing poorly differentiated squamous cell carcinoma; metastatic carcinoma; 3.7 cm , G3, + 1/7 hilar LN; All margins and remainder of lymph nodes (0/24) negative for carcinoma pT2a,pN1 (Stage IIB), EVG stain highlights the intact elastic visceral pleura. +p40, (-) TTF-1. path of MILLA wedge resection revealing lung parenchyma with emphysematous changes, negative for carcinoma.\par \par CT chest on 3/3/21:\par - Post op changes\par - There is minimal pleural tethering along the left lateral chest wall. Attention on follow-up. \par - Emphsyema.\par \par Patient was referred to Dr. Catarino Aguirre (Hem/Onc). Completed chemotherapy end of March, 2021. S/p 4  Cycles\par \par CT chest on 6/3/21:\par - s/p LLLobectomy and a MILLA wedge resection.\par - Unchanged, subtle area of pleural thickening adjacent to the lateral fifth rib\par - Unchanged, hypodense thyroid nodule with a peripheral calcification in right lobe measure less than 10 mm \par - Emphysema\par \par CT chest on 9/8/21:\par - Stable, post op changes\par \par CT Chest/Abdo on 12/8/21: \par - Post op changes\par - Stable partially calcified 1.5 cm nodule in the right hemithyroid. \par - No evidence of recurrence or metastasis.\par \par CT Chest on 3/1/22: \par - Status post partial left lung resection without evidence of recurrence or metastatic disease\par \par Patient presents to office for follow up. Follows with Dr. Catarino Aguirre. Today, patient denies worsening SOB, chest pain, cough, hemoptysis, fever, chills, night sweats, lightheadedness or dizziness.\par

## 2022-03-08 NOTE — CONSULT LETTER
[FreeTextEntry2] : Dr. Freedom Fallon (Pulm/Ref) \par  Dr. Catarino Aguirre ([facility])  [FreeTextEntry3] : Arden Zuleta MD, FACS \par Chief, Division of Thoracic Surgery \par Director, Minimally Invasive Thoracic Surgery \par Department of Cardiovascular and Thoracic Surgery \par [facility] \par , Cardiovascular and Thoracic Surgery\par \par \par

## 2022-06-01 ENCOUNTER — APPOINTMENT (OUTPATIENT)
Dept: CT IMAGING | Facility: IMAGING CENTER | Age: 62
End: 2022-06-01

## 2022-06-03 ENCOUNTER — NON-APPOINTMENT (OUTPATIENT)
Age: 62
End: 2022-06-03

## 2022-06-05 ENCOUNTER — OUTPATIENT (OUTPATIENT)
Dept: OUTPATIENT SERVICES | Facility: HOSPITAL | Age: 62
LOS: 1 days | End: 2022-06-05
Payer: COMMERCIAL

## 2022-06-05 ENCOUNTER — APPOINTMENT (OUTPATIENT)
Dept: CT IMAGING | Facility: IMAGING CENTER | Age: 62
End: 2022-06-05
Payer: COMMERCIAL

## 2022-06-05 DIAGNOSIS — Z98.890 OTHER SPECIFIED POSTPROCEDURAL STATES: Chronic | ICD-10-CM

## 2022-06-05 DIAGNOSIS — C34.90 MALIGNANT NEOPLASM OF UNSPECIFIED PART OF UNSPECIFIED BRONCHUS OR LUNG: ICD-10-CM

## 2022-06-05 DIAGNOSIS — Z00.8 ENCOUNTER FOR OTHER GENERAL EXAMINATION: ICD-10-CM

## 2022-06-05 DIAGNOSIS — Z90.89 ACQUIRED ABSENCE OF OTHER ORGANS: Chronic | ICD-10-CM

## 2022-06-05 PROCEDURE — 71250 CT THORAX DX C-: CPT

## 2022-06-05 PROCEDURE — 71250 CT THORAX DX C-: CPT | Mod: 26

## 2022-06-07 ENCOUNTER — APPOINTMENT (OUTPATIENT)
Dept: THORACIC SURGERY | Facility: CLINIC | Age: 62
End: 2022-06-07
Payer: COMMERCIAL

## 2022-06-07 ENCOUNTER — RESULT REVIEW (OUTPATIENT)
Age: 62
End: 2022-06-07

## 2022-06-07 VITALS
WEIGHT: 160 LBS | DIASTOLIC BLOOD PRESSURE: 76 MMHG | HEART RATE: 76 BPM | RESPIRATION RATE: 17 BRPM | SYSTOLIC BLOOD PRESSURE: 117 MMHG | HEIGHT: 63 IN | BODY MASS INDEX: 28.35 KG/M2 | OXYGEN SATURATION: 96 %

## 2022-06-07 PROCEDURE — 99214 OFFICE O/P EST MOD 30 MIN: CPT

## 2022-06-09 NOTE — HISTORY OF PRESENT ILLNESS
[FreeTextEntry1] : Ms. AVIVA VALENCIA, 62 year old female, former smoker (1/2 PPD x 30 years; Quit 10/2020), w/ hx of COPD, who presented to PCP for low dose CT lung cancer screening and was found to have new lung nodule. Initially presented to office in October, 2020 for surgical evaluation.\par \par Patient is now approx 1.5  years s/p Flex bronch, uniportal Lt VATS, LLL anatomic video-assisted thoracoscopic lobectomy, MLND, intercostal nerve block on 11/20/2020. Path of LLLobectomy revealing poorly differentiated squamous cell carcinoma; metastatic carcinoma; 3.7 cm , G3, + 1/7 hilar LN; All margins and remainder of lymph nodes (0/24) negative for carcinoma pT2a,pN1 (Stage IIB), EVG stain highlights the intact elastic visceral pleura. +p40, (-) TTF-1. path of MILLA wedge resection revealing lung parenchyma with emphysematous changes, negative for carcinoma.\par \par CT chest on 3/3/21:\par - Post op changes\par - There is minimal pleural tethering along the left lateral chest wall. Attention on follow-up. \par - Emphsyema.\par \par Patient was referred to Dr. Catarino Aguirre (Hem/Onc). Completed chemotherapy end of March, 2021. S/p 4  Cycles\par \par CT chest on 6/3/21:\par - s/p LLLobectomy and a MILLA wedge resection.\par - Unchanged, subtle area of pleural thickening adjacent to the lateral fifth rib\par - Unchanged, hypodense thyroid nodule with a peripheral calcification in right lobe measure less than 10 mm \par - Emphysema\par \par CT chest on 9/8/21:\par - Stable, post op changes\par \par CT Chest/Abdo on 12/8/21: \par - Post op changes\par - Stable partially calcified 1.5 cm nodule in the right hemithyroid. \par - No evidence of recurrence or metastasis.\par \par CT Chest on 3/1/22: \par - Status post partial left lung resection without evidence of recurrence or metastatic disease\par \par CT Chest on 6/5/22:\par - Stable post op changes\par - Partially included calcified right thyroid nodule. No enlarged lymph nodes.\par - Subjectively mild amount of coronary calcified plaque.\par \par Patient presents to office for follow up. Follows with Dr. Catarino Aguirre. Today, patient with no complaints. Denies worsening SOB, chest pain, cough, hemoptysis, fever, chills, night sweats, lightheadedness or dizziness.\par

## 2022-06-09 NOTE — ASSESSMENT
[FreeTextEntry1] : Ms. AVIVA VALENCIA, 62 year old female, former smoker (1/2 PPD x 30 years; Quit 10/2020), w/ hx of COPD, who presented to PCP for low dose CT lung cancer screening and was found to have new lung nodule. Initially presented to office in October, 2020 for surgical evaluation.\par \par Patient is now approx 1.5  years s/p Flex bronch, uniportal Lt VATS, LLL anatomic video-assisted thoracoscopic lobectomy, MLND, intercostal nerve block on 11/20/2020. Path of LLLobectomy revealing poorly differentiated squamous cell carcinoma; metastatic carcinoma; 3.7 cm , G3, + 1/7 hilar LN; All margins and remainder of lymph nodes (0/24) negative for carcinoma pT2a,pN1 (Stage IIB), EVG stain highlights the intact elastic visceral pleura. +p40, (-) TTF-1. path of MILLA wedge resection revealing lung parenchyma with emphysematous changes, negative for carcinoma.\par \par Patient was referred to Dr. Catarino Aguirre (Hem/Onc). Completed chemotherapy end of March, 2021. S/p 4  Cycles\par \par Here today with follow up scan. \par \par I have independently reviewed the medical records and imaging at the time of this office consultation, and discussed the following interpretations with the patient:\par - CT Chest with stable, post op findings. Recommendation to return to clinic in 6 months with repeat CT Chest, no contrast.\par - [facility]\par \par Recommendations reviewed with patient during this office visit, and all questions answered; Patient instructed on the importance of follow up and verbalizes understanding.\par \par I personally performed the services described in the documentation, reviewed the documentation recorded by the scribe in my presence and it accurately and completely records my words and actions.\par \par I, JIGNA ColonC, am scribing for and the presence of NATHAN Lehman, the following sections HISTORY OF PRESENT ILLNESS, PAST MEDICAL/FAMILY/SOCIAL HISTORY; REVIEW OF SYSTEMS; VITAL SIGNS; PHYSICAL EXAM; DISPOSITION.\par \par \par

## 2022-06-09 NOTE — CONSULT LETTER
[Dear  ___] : Dear  [unfilled], [Courtesy Letter:] : I had the pleasure of seeing your patient, [unfilled], in my office today. [Please see my note below.] : Please see my note below. [Sincerely,] : Sincerely, [FreeTextEntry2] : Dr. Freedom Fallon (Pulm/Ref) \par  Dr. Catarino Aguirre ([facility])  [FreeTextEntry3] : Arden Zuleta MD, FACS \par Chief, Division of Thoracic Surgery \par Director, Minimally Invasive Thoracic Surgery \par Department of Cardiovascular and Thoracic Surgery \par [facility] \par , Cardiovascular and Thoracic Surgery\par \par \par

## 2022-06-09 NOTE — PHYSICAL EXAM
[] : no respiratory distress [Respiration, Rhythm And Depth] : normal respiratory rhythm and effort [Exaggerated Use Of Accessory Muscles For Inspiration] : no accessory muscle use [Auscultation Breath Sounds / Voice Sounds] : lungs were clear to auscultation bilaterally [Examination Of The Chest] : the chest was normal in appearance [Chest Visual Inspection Thoracic Asymmetry] : no chest asymmetry [Diminished Respiratory Excursion] : normal chest expansion [2+] : left 2+ [Breast Appearance] : normal in appearance [Breast Palpation Mass] : no palpable masses [Bowel Sounds] : normal bowel sounds [Abdomen Soft] : soft [Abdomen Tenderness] : non-tender [Cervical Lymph Nodes Enlarged Posterior Bilaterally] : posterior cervical [Cervical Lymph Nodes Enlarged Anterior Bilaterally] : anterior cervical [Supraclavicular Lymph Nodes Enlarged Bilaterally] : supraclavicular [No CVA Tenderness] : no ~M costovertebral angle tenderness [No Spinal Tenderness] : no spinal tenderness [Abnormal Walk] : normal gait [Skin Color & Pigmentation] : normal skin color and pigmentation [No Focal Deficits] : no focal deficits [Oriented To Time, Place, And Person] : oriented to person, place, and time

## 2022-06-23 NOTE — BRIEF OPERATIVE NOTE - TYPE OF ANESTHESIA
tablet by mouth daily 30 tablet 3     No current facility-administered medications for this visit. Allergies   Allergen Reactions    Pollen Extract Other (See Comments)     Sneezing, watery eyes       Subjective:      Review of Systems   Constitutional: Negative for chills, diaphoresis, fever and unexpected weight change. HENT: Negative. Negative for mouth sores. Eyes: Negative. Respiratory: Negative. Cardiovascular: Negative. Gastrointestinal: Negative for abdominal pain. Musculoskeletal: Negative for arthralgias and myalgias. Skin: Negative for color change, pallor, rash and wound. Allergic/Immunologic: Negative for environmental allergies, food allergies and immunocompromised state. Hematological: Negative for adenopathy. Objective:     /84   Pulse 90   Ht 5' 10\" (1.778 m)   Wt 258 lb (117 kg)   SpO2 96%   BMI 37.02 kg/m²   Physical Exam  Skin:     Comments: 2 flat blisters on thenar eminence of right hand with some redness surrounding it         Assessment:       Diagnosis Orders   1. Blister          Plan:    Wound instructions given  Mupirocin tid  Complete Bactim  If this is spreading, please call. No follow-ups on file. No orders of the defined types were placed in this encounter.     Orders Placed This Encounter   Medications    sulfamethoxazole-trimethoprim (BACTRIM DS) 800-160 MG per tablet     Sig: Take 1 tablet by mouth 2 times daily for 7 days     Dispense:  14 tablet     Refill:  0           Electronically signed by Julissa Alexander 6/23/2022 at 1:48 PM General

## 2022-12-06 ENCOUNTER — OUTPATIENT (OUTPATIENT)
Dept: OUTPATIENT SERVICES | Facility: HOSPITAL | Age: 62
LOS: 1 days | End: 2022-12-06
Payer: COMMERCIAL

## 2022-12-06 ENCOUNTER — APPOINTMENT (OUTPATIENT)
Dept: CT IMAGING | Facility: IMAGING CENTER | Age: 62
End: 2022-12-06

## 2022-12-06 DIAGNOSIS — Z98.890 OTHER SPECIFIED POSTPROCEDURAL STATES: Chronic | ICD-10-CM

## 2022-12-06 DIAGNOSIS — C34.32 MALIGNANT NEOPLASM OF LOWER LOBE, LEFT BRONCHUS OR LUNG: ICD-10-CM

## 2022-12-06 DIAGNOSIS — Z90.89 ACQUIRED ABSENCE OF OTHER ORGANS: Chronic | ICD-10-CM

## 2022-12-06 DIAGNOSIS — R97.0 ELEVATED CARCINOEMBRYONIC ANTIGEN [CEA]: ICD-10-CM

## 2022-12-06 PROCEDURE — 71260 CT THORAX DX C+: CPT

## 2022-12-06 PROCEDURE — 74177 CT ABD & PELVIS W/CONTRAST: CPT | Mod: 26

## 2022-12-06 PROCEDURE — 74177 CT ABD & PELVIS W/CONTRAST: CPT

## 2022-12-06 PROCEDURE — 71260 CT THORAX DX C+: CPT | Mod: 26

## 2022-12-20 ENCOUNTER — APPOINTMENT (OUTPATIENT)
Dept: THORACIC SURGERY | Facility: CLINIC | Age: 62
End: 2022-12-20

## 2022-12-20 VITALS
HEART RATE: 85 BPM | WEIGHT: 170 LBS | HEIGHT: 63 IN | BODY MASS INDEX: 30.12 KG/M2 | RESPIRATION RATE: 18 BRPM | SYSTOLIC BLOOD PRESSURE: 137 MMHG | DIASTOLIC BLOOD PRESSURE: 82 MMHG | OXYGEN SATURATION: 96 %

## 2022-12-20 PROCEDURE — 99214 OFFICE O/P EST MOD 30 MIN: CPT

## 2022-12-20 NOTE — PHYSICAL EXAM
[] : no respiratory distress [Respiration, Rhythm And Depth] : normal respiratory rhythm and effort [Exaggerated Use Of Accessory Muscles For Inspiration] : no accessory muscle use [Auscultation Breath Sounds / Voice Sounds] : lungs were clear to auscultation bilaterally [Examination Of The Chest] : the chest was normal in appearance [Chest Visual Inspection Thoracic Asymmetry] : no chest asymmetry [Diminished Respiratory Excursion] : normal chest expansion [2+] : left 2+ [Cervical Lymph Nodes Enlarged Posterior Bilaterally] : posterior cervical [Cervical Lymph Nodes Enlarged Anterior Bilaterally] : anterior cervical [Supraclavicular Lymph Nodes Enlarged Bilaterally] : supraclavicular [Involuntary Movements] : no involuntary movements were seen [Skin Color & Pigmentation] : normal skin color and pigmentation [Oriented To Time, Place, And Person] : oriented to person, place, and time

## 2022-12-20 NOTE — CONSULT LETTER
[Dear  ___] : Dear  [unfilled], [Courtesy Letter:] : I had the pleasure of seeing your patient, [unfilled], in my office today. [Please see my note below.] : Please see my note below. [Sincerely,] : Sincerely, [FreeTextEntry2] : Dr. Freedom Fallon (Pulm/Ref) \par  Dr. Catarino Aguirre ([facility])  [FreeTextEntry3] : Arden Zuleta MD, FACS \par Chief, Division of Thoracic Surgery \par Director, Minimally Invasive Thoracic Surgery \par Department of Cardiovascular and Thoracic Surgery \par [facility] \par , Cardiovascular and Thoracic Surgery\par \par \par

## 2022-12-20 NOTE — HISTORY OF PRESENT ILLNESS
[FreeTextEntry1] : Ms. AVIVA VALENCIA, 62 year old female, former smoker (1/2 PPD x 30 years; Quit 10/2020), w/ hx of COPD, who presented to PCP for low dose CT lung cancer screening and was found to have new lung nodule. Initially presented to office in October, 2020 for surgical evaluation.\par \par Patient is now approx 1.5  years s/p Flex bronch, uniportal Lt VATS, LLL anatomic video-assisted thoracoscopic lobectomy, MLND, intercostal nerve block on 11/20/2020. Path of LLLobectomy revealing poorly differentiated squamous cell carcinoma; metastatic carcinoma; 3.7 cm , G3, + 1/7 hilar LN; All margins and remainder of lymph nodes (0/24) negative for carcinoma pT2a,pN1 (Stage IIB), EVG stain highlights the intact elastic visceral pleura. +p40, (-) TTF-1. path of MILLA wedge resection revealing lung parenchyma with emphysematous changes, negative for carcinoma.\par \par CT chest on 3/3/21:\par - Post op changes\par - There is minimal pleural tethering along the left lateral chest wall. Attention on follow-up. \par - Emphsyema.\par \par Patient was referred to Dr. Catarino Aguirre (Hem/Onc). Completed chemotherapy end of March, 2021. S/p 4  Cycles\par \par CT chest on 6/3/21:\par - s/p LLLobectomy and a MILLA wedge resection.\par - Unchanged, subtle area of pleural thickening adjacent to the lateral fifth rib\par - Unchanged, hypodense thyroid nodule with a peripheral calcification in right lobe measure less than 10 mm \par - Emphysema\par \par CT chest on 9/8/21:\par - Stable, post op changes\par \par CT Chest/Abdo on 12/8/21: \par - Post op changes\par - Stable partially calcified 1.5 cm nodule in the right hemithyroid. \par - No evidence of recurrence or metastasis.\par \par CT Chest on 3/1/22: \par - Status post partial left lung resection without evidence of recurrence or metastatic disease\par \par CT Chest on 6/5/22:\par - Stable post op changes\par - Partially included calcified right thyroid nodule. No enlarged lymph nodes.\par - Subjectively mild amount of coronary calcified plaque.\par \par CT Chest AP on 12/6/22:\par - No recurrent/metastatic disease in the chest, abdomen or pelvis. \par \par Patient is here today for 6 month follow up. Endorses that she follows with Dr. Aguirre every 3 months. Today, patient denies worsening SOB, chest pain, cough, hemoptysis, fever, chills, night sweats, lightheadedness or dizziness.\par

## 2022-12-20 NOTE — ASSESSMENT
[FreeTextEntry1] : Ms. AVIVA VALENCIA, 62 year old female, former smoker (1/2 PPD x 30 years; Quit 10/2020), w/ hx of COPD, who presented to PCP for low dose CT lung cancer screening and was found to have new lung nodule. Initially presented to office in October, 2020 for surgical evaluation.\par \par 11/20/20 s/p Flex bronch, uniportal Lt VATS, LLL anatomic video-assisted thoracoscopic lobectomy, MLND, intercostal nerve block. Path of LLLobectomy revealing poorly differentiated squamous cell carcinoma; metastatic carcinoma; 3.7 cm , G3, + 1/7 hilar LN; All margins and remainder of lymph nodes (0/24) negative for carcinoma pT2a,pN1 (Stage IIB), EVG stain highlights the intact elastic visceral pleura. +p40, (-) TTF-1. path of MILLA wedge resection revealing lung parenchyma with emphysematous changes, negative for carcinoma.\par \par I have independently reviewed the patient's medical records and diagnostic images at time of this office consultation and have made the following recommendation:\par 1.  CT Chest with stable findings. Discussed follow up CT Chest in 6 months to re-evaluate stability. Patient is agreeable. \par \par Recommendations reviewed with patient during this office visit, and all questions answered; Patient instructed on the importance of follow up and verbalizes understanding.\par \par I, NATHAN Lehman, personally performed the evaluation and management (E/M) services for this established patient. That E/M includes conducting the examination, assessing all new/exacerbated conditions, and establishing a new plan of care. Today, My ACP, Odalys Sheikh, was here to observe my evaluation and management services for this patient to be followed going forward.\par \par \par  \par \par

## 2023-06-06 ENCOUNTER — APPOINTMENT (OUTPATIENT)
Dept: CT IMAGING | Facility: IMAGING CENTER | Age: 63
End: 2023-06-06
Payer: COMMERCIAL

## 2023-06-06 ENCOUNTER — OUTPATIENT (OUTPATIENT)
Dept: OUTPATIENT SERVICES | Facility: HOSPITAL | Age: 63
LOS: 1 days | End: 2023-06-06
Payer: COMMERCIAL

## 2023-06-06 DIAGNOSIS — Z01.818 ENCOUNTER FOR OTHER PREPROCEDURAL EXAMINATION: ICD-10-CM

## 2023-06-06 DIAGNOSIS — Z98.890 OTHER SPECIFIED POSTPROCEDURAL STATES: Chronic | ICD-10-CM

## 2023-06-06 DIAGNOSIS — Z90.89 ACQUIRED ABSENCE OF OTHER ORGANS: Chronic | ICD-10-CM

## 2023-06-06 PROCEDURE — 71250 CT THORAX DX C-: CPT

## 2023-06-06 PROCEDURE — 71250 CT THORAX DX C-: CPT | Mod: 26

## 2023-06-13 ENCOUNTER — APPOINTMENT (OUTPATIENT)
Dept: THORACIC SURGERY | Facility: CLINIC | Age: 63
End: 2023-06-13
Payer: COMMERCIAL

## 2023-06-13 ENCOUNTER — NON-APPOINTMENT (OUTPATIENT)
Age: 63
End: 2023-06-13

## 2023-06-20 ENCOUNTER — APPOINTMENT (OUTPATIENT)
Dept: THORACIC SURGERY | Facility: CLINIC | Age: 63
End: 2023-06-20
Payer: COMMERCIAL

## 2023-06-20 VITALS
DIASTOLIC BLOOD PRESSURE: 78 MMHG | HEIGHT: 63 IN | SYSTOLIC BLOOD PRESSURE: 127 MMHG | HEART RATE: 74 BPM | OXYGEN SATURATION: 96 % | BODY MASS INDEX: 30.12 KG/M2 | WEIGHT: 170 LBS

## 2023-06-20 PROCEDURE — 99214 OFFICE O/P EST MOD 30 MIN: CPT

## 2023-06-20 NOTE — HISTORY OF PRESENT ILLNESS
[FreeTextEntry1] : Ms. AVIVA VALENCIA, 63 year old female, former smoker (1/2 PPD x 30 years; Quit 10/2020), w/ hx of COPD, who presented to PCP for low dose CT lung cancer screening and was found to have new lung nodule. Initially presented to office in October, 2020 for surgical evaluation.\par \par Patient is s/p Flex bronch, uniportal Lt VATS, LLL anatomic video-assisted thoracoscopic lobectomy, MLND, intercostal nerve block on 11/20/2020. Path of LLLobectomy revealing poorly differentiated squamous cell carcinoma; metastatic carcinoma; 3.7 cm , G3, + 1/7 hilar LN; All margins and remainder of lymph nodes (0/24) negative for carcinoma pT2a,pN1 (Stage IIB), EVG stain highlights the intact elastic visceral pleura. +p40, (-) TTF-1. path of MILLA wedge resection revealing lung parenchyma with emphysematous changes, negative for carcinoma.\par \par CT chest on 3/3/21:\par - Post op changes\par - There is minimal pleural tethering along the left lateral chest wall. Attention on follow-up. \par - Emphsyema.\par \par Patient was referred to Dr. Catarino Aguirre (Hem/Onc). Completed chemotherapy end of March, 2021. S/p 4  Cycles\par \par CT chest on 6/3/21:\par - s/p LLLobectomy and a MILLA wedge resection.\par - Unchanged, subtle area of pleural thickening adjacent to the lateral fifth rib\par - Unchanged, hypodense thyroid nodule with a peripheral calcification in right lobe measure less than 10 mm \par - Emphysema\par \par CT chest on 9/8/21:\par - Stable, post op changes\par \par CT Chest/Abdo on 12/8/21: \par - Post op changes\par - Stable partially calcified 1.5 cm nodule in the right hemithyroid. \par - No evidence of recurrence or metastasis.\par \par CT Chest on 3/1/22: \par - Status post partial left lung resection without evidence of recurrence or metastatic disease\par \par CT Chest on 6/5/22:\par - Stable post op changes\par - Partially included calcified right thyroid nodule. No enlarged lymph nodes.\par - Subjectively mild amount of coronary calcified plaque.\par \par CT Chest AP on 12/6/22:\par - No recurrent/metastatic disease in the chest, abdomen or pelvis. \par \par CT chest on 06/06/2023:\par - Left lower lobectomy and left upper lobe wedge resection. \par - No endobronchial lesion. \par - Emphysema.\par \par Patient is here today for 6 month follow up. Today, patient denies worsening SOB, chest pain, cough, hemoptysis, fever, chills, night sweats, lightheadedness or dizziness.\par

## 2023-06-20 NOTE — ASSESSMENT
[FreeTextEntry1] : Ms. AVIVA VALENCIA, 63 year old female, former smoker (1/2 PPD x 30 years; Quit 10/2020), w/ hx of COPD, who presented to PCP for low dose CT lung cancer screening and was found to have new lung nodule. Initially presented to office in October, 2020 for surgical evaluation.\par \par 11/20/20 s/p Flex bronch, uniportal Lt VATS, LLL anatomic video-assisted thoracoscopic lobectomy, MLND, intercostal nerve block. Path of LLLobectomy revealing poorly differentiated squamous cell carcinoma; metastatic carcinoma; 3.7 cm , G3, + 1/7 hilar LN; All margins and remainder of lymph nodes (0/24) negative for carcinoma pT2a,pN1 (Stage IIB), EVG stain highlights the intact elastic visceral pleura. +p40, (-) TTF-1. path of MILLA wedge resection revealing lung parenchyma with emphysematous changes, negative for carcinoma.\par \par I have independently reviewed the patient's medical records and diagnostic images at time of this office consultation and have made the following recommendation:\par 1.  CT Chest reviewed; Stable findings. Discussed returning to clinic in 6 months with repeat CT Chest, no contrast, to re-evaluate stability \par \par Recommendations reviewed with patient during this office visit, and all questions answered; Patient instructed on the importance of follow up and verbalizes understanding.\par \par I, EDDIE LehmanIM, personally performed the evaluation and management (E/M) services for this established patient. That E/M includes conducting the examination, assessing all new/exacerbated conditions, and establishing a new plan of care. Today, My ACP, Odalys Sheikh, was here to observe my evaluation and management services for this patient to be followed going forward.\par \par \par \par \par  \par \par

## 2023-06-20 NOTE — PHYSICAL EXAM
[] : no respiratory distress [Respiration, Rhythm And Depth] : normal respiratory rhythm and effort [Exaggerated Use Of Accessory Muscles For Inspiration] : no accessory muscle use [Auscultation Breath Sounds / Voice Sounds] : lungs were clear to auscultation bilaterally [Heart Rate And Rhythm] : heart rate was normal and rhythm regular [Examination Of The Chest] : the chest was normal in appearance [Chest Visual Inspection Thoracic Asymmetry] : no chest asymmetry [Diminished Respiratory Excursion] : normal chest expansion [2+] : left 2+ [Cervical Lymph Nodes Enlarged Posterior Bilaterally] : posterior cervical [Cervical Lymph Nodes Enlarged Anterior Bilaterally] : anterior cervical [Supraclavicular Lymph Nodes Enlarged Bilaterally] : supraclavicular [Involuntary Movements] : no involuntary movements were seen [Skin Color & Pigmentation] : normal skin color and pigmentation [Oriented To Time, Place, And Person] : oriented to person, place, and time

## 2023-06-20 NOTE — CONSULT LETTER
[FreeTextEntry2] : Dr. Freedom Fallon (Pulm/Ref) \par  Dr. Catarino Aguirre ([facility])  [FreeTextEntry3] : Arden Zuleta MD, FACS \par Chief, Division of Thoracic Surgery \par Director, Minimally Invasive Thoracic Surgery \par Department of Cardiovascular and Thoracic Surgery \par [facility] \par , Cardiovascular and Thoracic Surgery\par \par \par

## 2023-12-12 ENCOUNTER — OUTPATIENT (OUTPATIENT)
Dept: OUTPATIENT SERVICES | Facility: HOSPITAL | Age: 63
LOS: 1 days | End: 2023-12-12
Payer: COMMERCIAL

## 2023-12-12 ENCOUNTER — APPOINTMENT (OUTPATIENT)
Dept: CT IMAGING | Facility: IMAGING CENTER | Age: 63
End: 2023-12-12
Payer: COMMERCIAL

## 2023-12-12 DIAGNOSIS — Z90.89 ACQUIRED ABSENCE OF OTHER ORGANS: Chronic | ICD-10-CM

## 2023-12-12 DIAGNOSIS — Z98.890 OTHER SPECIFIED POSTPROCEDURAL STATES: Chronic | ICD-10-CM

## 2023-12-12 DIAGNOSIS — C34.90 MALIGNANT NEOPLASM OF UNSPECIFIED PART OF UNSPECIFIED BRONCHUS OR LUNG: ICD-10-CM

## 2023-12-12 PROCEDURE — 71250 CT THORAX DX C-: CPT | Mod: 26

## 2023-12-12 PROCEDURE — 71250 CT THORAX DX C-: CPT

## 2023-12-19 ENCOUNTER — APPOINTMENT (OUTPATIENT)
Dept: THORACIC SURGERY | Facility: CLINIC | Age: 63
End: 2023-12-19
Payer: COMMERCIAL

## 2023-12-19 PROCEDURE — 99442: CPT

## 2023-12-19 NOTE — REASON FOR VISIT
[Follow-Up: _____] : a [unfilled] follow-up visit [Home] : at home, [unfilled] , at the time of the visit. [Medical Office: ([facility])___] : at the medical office located in  [Verbal consent obtained from patient] : the patient, [unfilled]

## 2023-12-19 NOTE — CONSULT LETTER
[Dear  ___] : Dear  [unfilled], [Courtesy Letter:] : I had the pleasure of seeing your patient, [unfilled], in my office today. [Please see my note below.] : Please see my note below. [Sincerely,] : Sincerely, [FreeTextEntry2] : Dr. Freedom Fallon (Pulm/Ref) \par   Dr. Catarino Aguirre ([facility])  [FreeTextEntry3] : Arden Zuleta MD, FACS \par  Chief, Division of Thoracic Surgery \par  Director, Minimally Invasive Thoracic Surgery \par  Department of Cardiovascular and Thoracic Surgery \par  [facility] \par  , Cardiovascular and Thoracic Surgery\par  \par  \par

## 2023-12-19 NOTE — ASSESSMENT
[FreeTextEntry1] : Ms. AVIVA VALENCIA, 63 year old female, former smoker (1/2 PPD x 30 years; Quit 10/2020), w/ hx of COPD, who presented to PCP for low dose CT lung cancer screening and was found to have new lung nodule. Initially presented to office in October, 2020 for surgical evaluation.  11/20/20 s/p Flex bronch, uniportal Lt VATS, LLL anatomic video-assisted thoracoscopic lobectomy, MLND, intercostal nerve block. Path of LLLobectomy revealing poorly differentiated squamous cell carcinoma; metastatic carcinoma; 3.7 cm , G3, + 1/7 hilar LN; All margins and remainder of lymph nodes (0/24) negative for carcinoma pT2a,pN1 (Stage IIB), EVG stain highlights the intact elastic visceral pleura. +p40, (-) TTF-1. path of MILLA wedge resection revealing lung parenchyma with emphysematous changes, negative for carcinoma.  She presents today for follow up with scan.  I have independently reviewed the patient's medical records and diagnostic images at time of this office consultation and have made the following recommendation: 1. CT Chest reviewed with patient, stable scan. I discussed she returns to clinic in 6 months with repeat CT Chest without contrast. She is agreeable.  Recommendations reviewed with patient during this office visit, and all questions answered; Patient instructed on the importance of follow up and verbalizes understanding.   I, EDDIE LehmanIM, personally performed the evaluation and management (E/M) services for this established patient. That E/M includes conducting the examination, assessing all new/exacerbated conditions, and establishing a new plan of care. Today, my ACP, Joselo Perez NP, was here to observe my evaluation and management services for this new problem/exacerbated condition to be followed going forward.

## 2023-12-19 NOTE — HISTORY OF PRESENT ILLNESS
[FreeTextEntry1] : Ms. AVIVA VALENCIA, 63 year old female, former smoker (1/2 PPD x 30 years; Quit 10/2020), w/ hx of COPD, who presented to PCP for low dose CT lung cancer screening and was found to have new lung nodule. Initially presented to office in October, 2020 for surgical evaluation.  Patient is s/p Flex bronch, uniportal Lt VATS, LLL anatomic video-assisted thoracoscopic lobectomy, MLND, intercostal nerve block on 11/20/2020. Path of LLLobectomy revealing poorly differentiated squamous cell carcinoma; metastatic carcinoma; 3.7 cm , G3, + 1/7 hilar LN; All margins and remainder of lymph nodes (0/24) negative for carcinoma pT2a,pN1 (Stage IIB), EVG stain highlights the intact elastic visceral pleura. +p40, (-) TTF-1. path of MILLA wedge resection revealing lung parenchyma with emphysematous changes, negative for carcinoma.  CT chest on 3/3/21: - Post op changes - There is minimal pleural tethering along the left lateral chest wall. Attention on follow-up.  - Emphsyema.  Patient was referred to Dr. Catarino Aguirre (Hem/Onc). Completed chemotherapy end of March, 2021. S/p 4  Cycles  CT chest on 6/3/21: - s/p LLLobectomy and a MILLA wedge resection. - Unchanged, subtle area of pleural thickening adjacent to the lateral fifth rib - Unchanged, hypodense thyroid nodule with a peripheral calcification in right lobe measure less than 10 mm  - Emphysema  CT chest on 9/8/21: - Stable, post op changes  CT Chest/Abdo on 12/8/21:  - Post op changes - Stable partially calcified 1.5 cm nodule in the right hemithyroid.  - No evidence of recurrence or metastasis.  CT Chest on 3/1/22:  - Status post partial left lung resection without evidence of recurrence or metastatic disease  CT Chest on 6/5/22: - Stable post op changes - Partially included calcified right thyroid nodule. No enlarged lymph nodes. - Subjectively mild amount of coronary calcified plaque.  CT Chest AP on 12/6/22: - No recurrent/metastatic disease in the chest, abdomen or pelvis.   CT chest on 06/06/2023: - Left lower lobectomy and left upper lobe wedge resection.  - No endobronchial lesion.  - Emphysema.  CT Chest on 12/12/2023: - Emphysema. - Status post left lower lobectomy and left upper lobe wedge resection with stable postsurgical changes.    Patient is here today for 6 month follow up via tele visit. Overall, she reports to be feeling well. Denies any chest pain, shortness of breath, cough, or hemoptysis.

## 2024-06-11 ENCOUNTER — OUTPATIENT (OUTPATIENT)
Dept: OUTPATIENT SERVICES | Facility: HOSPITAL | Age: 64
LOS: 1 days | End: 2024-06-11
Payer: COMMERCIAL

## 2024-06-11 ENCOUNTER — APPOINTMENT (OUTPATIENT)
Dept: CT IMAGING | Facility: IMAGING CENTER | Age: 64
End: 2024-06-11
Payer: COMMERCIAL

## 2024-06-11 DIAGNOSIS — Z90.89 ACQUIRED ABSENCE OF OTHER ORGANS: Chronic | ICD-10-CM

## 2024-06-11 DIAGNOSIS — Z98.890 OTHER SPECIFIED POSTPROCEDURAL STATES: Chronic | ICD-10-CM

## 2024-06-11 DIAGNOSIS — C34.90 MALIGNANT NEOPLASM OF UNSPECIFIED PART OF UNSPECIFIED BRONCHUS OR LUNG: ICD-10-CM

## 2024-06-11 PROCEDURE — 71250 CT THORAX DX C-: CPT

## 2024-06-11 PROCEDURE — 71250 CT THORAX DX C-: CPT | Mod: 26

## 2024-06-18 ENCOUNTER — APPOINTMENT (OUTPATIENT)
Dept: THORACIC SURGERY | Facility: CLINIC | Age: 64
End: 2024-06-18
Payer: COMMERCIAL

## 2024-06-18 DIAGNOSIS — C34.90 MALIGNANT NEOPLASM OF UNSPECIFIED PART OF UNSPECIFIED BRONCHUS OR LUNG: ICD-10-CM

## 2024-06-18 PROCEDURE — 99442: CPT

## 2024-06-18 NOTE — ASSESSMENT
[FreeTextEntry1] : Ms. AVIVA VALENCIA, 64 year old female, former smoker (1/2 PPD x 30 years; Quit 10/2020), w/ hx of COPD, who presented to PCP for low dose CT lung cancer screening and was found to have new lung nodule. Initially presented to office in October, 2020 for surgical evaluation.  11/20/20 s/p Flex bronch, uniportal Lt VATS, LLL anatomic video-assisted thoracoscopic lobectomy, MLND, intercostal nerve block. Path of LLLobectomy revealing poorly differentiated squamous cell carcinoma; metastatic carcinoma; 3.7 cm , G3, + 1/7 hilar LN; All margins and remainder of lymph nodes (0/24) negative for carcinoma pT2a,pN1 (Stage IIB), EVG stain highlights the intact elastic visceral pleura. +p40, (-) TTF-1. path of MILLA wedge resection revealing lung parenchyma with emphysematous changes, negative for carcinoma.  She presents today for follow up with scan.  I have independently reviewed the patient's medical records and diagnostic images at time of this office consultation and have made the following recommendation: 1. CT Chest reviewed with patient, stable scan. I discussed she returns to clinic in 6 months with repeat CT Chest without contrast. She is agreeable.   Recommendations reviewed with patient during this office visit, and all questions answered; Patient instructed on the importance of follow up and verbalizes understanding.   I, EDDIE LehmanIM, personally performed the evaluation and management (E/M) services for this established patient. That E/M includes conducting the examination, assessing all new/exacerbated conditions, and establishing a new plan of care. Today, my ACP, Joselo Perez NP, was here to observe my evaluation and management services for this new problem/exacerbated condition to be followed going forward.

## 2024-06-18 NOTE — CONSULT LETTER
[Dear  ___] : Dear  [unfilled], [Courtesy Letter:] : I had the pleasure of seeing your patient, [unfilled], in my office today. [Please see my note below.] : Please see my note below. [Sincerely,] : Sincerely, [FreeTextEntry2] : Dr. Fredeom Fallon (Pulm/Ref) \par   Dr. Catarino Aguirre ([facility])  [FreeTextEntry3] : Arden Zuleta MD, FACS \par  Chief, Division of Thoracic Surgery \par  Director, Minimally Invasive Thoracic Surgery \par  Department of Cardiovascular and Thoracic Surgery \par  [facility] \par  , Cardiovascular and Thoracic Surgery\par  \par  \par

## 2024-06-18 NOTE — HISTORY OF PRESENT ILLNESS
[FreeTextEntry1] : Ms. AVIVA VALENCIA, 64 year old female, former smoker (1/2 PPD x 30 years; Quit 10/2020), w/ hx of COPD, who presented to PCP for low dose CT lung cancer screening and was found to have new lung nodule. Initially presented to office in October, 2020 for surgical evaluation.  Patient is s/p Flex bronch, uniportal Lt VATS, LLL anatomic video-assisted thoracoscopic lobectomy, MLND, intercostal nerve block on 11/20/2020. Path of LLLobectomy revealing poorly differentiated squamous cell carcinoma; metastatic carcinoma; 3.7 cm , G3, + 1/7 hilar LN; All margins and remainder of lymph nodes (0/24) negative for carcinoma pT2a,pN1 (Stage IIB), EVG stain highlights the intact elastic visceral pleura. +p40, (-) TTF-1. path of MILLA wedge resection revealing lung parenchyma with emphysematous changes, negative for carcinoma.  CT chest on 3/3/21: - Post op changes - There is minimal pleural tethering along the left lateral chest wall. Attention on follow-up.  - Emphsyema.  Patient was referred to Dr. Catarino Aguirre (Hem/Onc). Completed chemotherapy end of March, 2021. S/p 4  Cycles  CT chest on 6/3/21: - s/p LLLobectomy and a MILLA wedge resection. - Unchanged, subtle area of pleural thickening adjacent to the lateral fifth rib - Unchanged, hypodense thyroid nodule with a peripheral calcification in right lobe measure less than 10 mm  - Emphysema  CT chest on 9/8/21: - Stable, post op changes  CT Chest/Abdo on 12/8/21:  - Post op changes - Stable partially calcified 1.5 cm nodule in the right hemithyroid.  - No evidence of recurrence or metastasis.  CT Chest on 3/1/22:  - Status post partial left lung resection without evidence of recurrence or metastatic disease  CT Chest on 6/5/22: - Stable post op changes - Partially included calcified right thyroid nodule. No enlarged lymph nodes. - Subjectively mild amount of coronary calcified plaque.  CT Chest AP on 12/6/22: - No recurrent/metastatic disease in the chest, abdomen or pelvis.   CT chest on 06/06/2023: - Left lower lobectomy and left upper lobe wedge resection.  - No endobronchial lesion.  - Emphysema.  CT Chest on 12/12/2023: - Emphysema. - Status post left lower lobectomy and left upper lobe wedge resection with stable postsurgical changes.   CT Chest on 6/11/2024:  - s/p LLLobectomy and MILLA wedge resection with stable postoperative change.  - Coronary calcifications.   Patient is here today for 6 month follow up via tele visit. Overall, she reports to be feeling well. Denies any chest pain, shortness of breath, cough, or hemoptysis.

## 2024-10-12 ENCOUNTER — EMERGENCY (EMERGENCY)
Facility: HOSPITAL | Age: 64
LOS: 1 days | Discharge: HOME | End: 2024-10-12
Attending: EMERGENCY MEDICINE | Admitting: EMERGENCY MEDICINE
Payer: COMMERCIAL

## 2024-10-12 VITALS
HEART RATE: 105 BPM | WEIGHT: 179.9 LBS | TEMPERATURE: 98 F | OXYGEN SATURATION: 98 % | SYSTOLIC BLOOD PRESSURE: 163 MMHG | DIASTOLIC BLOOD PRESSURE: 83 MMHG | RESPIRATION RATE: 18 BRPM

## 2024-10-12 VITALS
HEART RATE: 119 BPM | RESPIRATION RATE: 18 BRPM | SYSTOLIC BLOOD PRESSURE: 125 MMHG | DIASTOLIC BLOOD PRESSURE: 59 MMHG | OXYGEN SATURATION: 93 % | TEMPERATURE: 99 F

## 2024-10-12 DIAGNOSIS — Z98.890 OTHER SPECIFIED POSTPROCEDURAL STATES: Chronic | ICD-10-CM

## 2024-10-12 DIAGNOSIS — Z90.89 ACQUIRED ABSENCE OF OTHER ORGANS: Chronic | ICD-10-CM

## 2024-10-12 LAB
ALBUMIN SERPL ELPH-MCNC: 4.1 G/DL — SIGNIFICANT CHANGE UP (ref 3.3–5)
ALP SERPL-CCNC: 69 U/L — SIGNIFICANT CHANGE UP (ref 40–120)
ALT FLD-CCNC: 14 U/L — SIGNIFICANT CHANGE UP (ref 4–33)
ANION GAP SERPL CALC-SCNC: 15 MMOL/L — HIGH (ref 7–14)
APPEARANCE UR: CLEAR — SIGNIFICANT CHANGE UP
AST SERPL-CCNC: 16 U/L — SIGNIFICANT CHANGE UP (ref 4–32)
BASOPHILS # BLD AUTO: 0 K/UL — SIGNIFICANT CHANGE UP (ref 0–0.2)
BASOPHILS NFR BLD AUTO: 0 % — SIGNIFICANT CHANGE UP (ref 0–2)
BILIRUB SERPL-MCNC: 1 MG/DL — SIGNIFICANT CHANGE UP (ref 0.2–1.2)
BILIRUB UR-MCNC: NEGATIVE — SIGNIFICANT CHANGE UP
BUN SERPL-MCNC: 13 MG/DL — SIGNIFICANT CHANGE UP (ref 7–23)
CALCIUM SERPL-MCNC: 9.1 MG/DL — SIGNIFICANT CHANGE UP (ref 8.4–10.5)
CHLORIDE SERPL-SCNC: 90 MMOL/L — LOW (ref 98–107)
CO2 SERPL-SCNC: 22 MMOL/L — SIGNIFICANT CHANGE UP (ref 22–31)
COLOR SPEC: YELLOW — SIGNIFICANT CHANGE UP
CREAT SERPL-MCNC: 0.82 MG/DL — SIGNIFICANT CHANGE UP (ref 0.5–1.3)
DIFF PNL FLD: NEGATIVE — SIGNIFICANT CHANGE UP
EGFR: 79 ML/MIN/1.73M2 — SIGNIFICANT CHANGE UP
EOSINOPHIL # BLD AUTO: 0 K/UL — SIGNIFICANT CHANGE UP (ref 0–0.5)
EOSINOPHIL NFR BLD AUTO: 0 % — SIGNIFICANT CHANGE UP (ref 0–6)
GLUCOSE SERPL-MCNC: 132 MG/DL — HIGH (ref 70–99)
GLUCOSE UR QL: NEGATIVE MG/DL — SIGNIFICANT CHANGE UP
HCT VFR BLD CALC: 37.5 % — SIGNIFICANT CHANGE UP (ref 34.5–45)
HGB BLD-MCNC: 13.3 G/DL — SIGNIFICANT CHANGE UP (ref 11.5–15.5)
IANC: 15.26 K/UL — HIGH (ref 1.8–7.4)
KETONES UR-MCNC: 40 MG/DL
LEUKOCYTE ESTERASE UR-ACNC: NEGATIVE — SIGNIFICANT CHANGE UP
LYMPHOCYTES # BLD AUTO: 0.47 K/UL — LOW (ref 1–3.3)
LYMPHOCYTES # BLD AUTO: 2.6 % — LOW (ref 13–44)
MANUAL SMEAR VERIFICATION: SIGNIFICANT CHANGE UP
MCHC RBC-ENTMCNC: 33.4 PG — SIGNIFICANT CHANGE UP (ref 27–34)
MCHC RBC-ENTMCNC: 35.5 GM/DL — SIGNIFICANT CHANGE UP (ref 32–36)
MCV RBC AUTO: 94.2 FL — SIGNIFICANT CHANGE UP (ref 80–100)
MONOCYTES # BLD AUTO: 2.21 K/UL — HIGH (ref 0–0.9)
MONOCYTES NFR BLD AUTO: 12.2 % — SIGNIFICANT CHANGE UP (ref 2–14)
NEUTROPHILS # BLD AUTO: 15.15 K/UL — HIGH (ref 1.8–7.4)
NEUTROPHILS NFR BLD AUTO: 83.5 % — HIGH (ref 43–77)
NITRITE UR-MCNC: NEGATIVE — SIGNIFICANT CHANGE UP
PH UR: 5.5 — SIGNIFICANT CHANGE UP (ref 5–8)
PLAT MORPH BLD: NORMAL — SIGNIFICANT CHANGE UP
PLATELET # BLD AUTO: 267 K/UL — SIGNIFICANT CHANGE UP (ref 150–400)
PLATELET COUNT - ESTIMATE: NORMAL — SIGNIFICANT CHANGE UP
POLYCHROMASIA BLD QL SMEAR: SLIGHT — SIGNIFICANT CHANGE UP
POTASSIUM SERPL-MCNC: 3.5 MMOL/L — SIGNIFICANT CHANGE UP (ref 3.5–5.3)
POTASSIUM SERPL-SCNC: 3.5 MMOL/L — SIGNIFICANT CHANGE UP (ref 3.5–5.3)
PROT SERPL-MCNC: 7.3 G/DL — SIGNIFICANT CHANGE UP (ref 6–8.3)
PROT UR-MCNC: SIGNIFICANT CHANGE UP MG/DL
RBC # BLD: 3.98 M/UL — SIGNIFICANT CHANGE UP (ref 3.8–5.2)
RBC # FLD: 12.5 % — SIGNIFICANT CHANGE UP (ref 10.3–14.5)
RBC BLD AUTO: NORMAL — SIGNIFICANT CHANGE UP
SODIUM SERPL-SCNC: 127 MMOL/L — LOW (ref 135–145)
SP GR SPEC: 1.02 — SIGNIFICANT CHANGE UP (ref 1–1.03)
UROBILINOGEN FLD QL: 0.2 MG/DL — SIGNIFICANT CHANGE UP (ref 0.2–1)
VARIANT LYMPHS # BLD: 1.7 % — SIGNIFICANT CHANGE UP (ref 0–6)
WBC # BLD: 18.14 K/UL — HIGH (ref 3.8–10.5)
WBC # FLD AUTO: 18.14 K/UL — HIGH (ref 3.8–10.5)

## 2024-10-12 PROCEDURE — 72132 CT LUMBAR SPINE W/DYE: CPT | Mod: 26,MC

## 2024-10-12 PROCEDURE — 72157 MRI CHEST SPINE W/O & W/DYE: CPT | Mod: 26,MC

## 2024-10-12 PROCEDURE — 74177 CT ABD & PELVIS W/CONTRAST: CPT | Mod: 26,MC

## 2024-10-12 PROCEDURE — 72158 MRI LUMBAR SPINE W/O & W/DYE: CPT | Mod: 26,MC

## 2024-10-12 PROCEDURE — 99285 EMERGENCY DEPT VISIT HI MDM: CPT

## 2024-10-12 RX ORDER — KETOROLAC TROMETHAMINE 10 MG/1
30 TABLET, FILM COATED ORAL ONCE
Refills: 0 | Status: DISCONTINUED | OUTPATIENT
Start: 2024-10-12 | End: 2024-10-12

## 2024-10-12 RX ORDER — ACETAMINOPHEN 325 MG
975 TABLET ORAL ONCE
Refills: 0 | Status: COMPLETED | OUTPATIENT
Start: 2024-10-12 | End: 2024-10-12

## 2024-10-12 RX ORDER — LIDOCAINE 50 MG/G
1 CREAM TOPICAL ONCE
Refills: 0 | Status: COMPLETED | OUTPATIENT
Start: 2024-10-12 | End: 2024-10-12

## 2024-10-12 RX ORDER — DIAZEPAM 10 MG/1
5 TABLET ORAL ONCE
Refills: 0 | Status: DISCONTINUED | OUTPATIENT
Start: 2024-10-12 | End: 2024-10-12

## 2024-10-12 RX ADMIN — LIDOCAINE 1 PATCH: 50 CREAM TOPICAL at 09:30

## 2024-10-12 RX ADMIN — LIDOCAINE 1 PATCH: 50 CREAM TOPICAL at 05:00

## 2024-10-12 RX ADMIN — Medication 975 MILLIGRAM(S): at 04:58

## 2024-10-12 RX ADMIN — DIAZEPAM 5 MILLIGRAM(S): 10 TABLET ORAL at 04:58

## 2024-10-12 RX ADMIN — LIDOCAINE 1 PATCH: 50 CREAM TOPICAL at 13:13

## 2024-10-12 RX ADMIN — Medication 600 MILLIGRAM(S): at 15:23

## 2024-10-12 RX ADMIN — Medication 975 MILLIGRAM(S): at 15:23

## 2024-10-12 RX ADMIN — KETOROLAC TROMETHAMINE 30 MILLIGRAM(S): 10 TABLET, FILM COATED ORAL at 05:00

## 2024-10-12 NOTE — ED PROVIDER NOTE - NSFOLLOWUPCLINICS_GEN_ALL_ED_FT
[facility]  Urology  [address] [place] [address] - 3rd [place] [address]  Phone: [phone]  Fax:   Follow Up Time: 4-6 Days

## 2024-10-12 NOTE — ED PROVIDER NOTE - PROGRESS NOTE DETAILS
Love HAN, EM/IM PGY-4: Patient signed out to me at 7 AM.  Last set of vital signs were 0700 with mild hypertension and heart rate of 95 otherwise within normal limits.  Patient is a 64-year-old female past medical history lung cancer in remission, presenting with a day of back pain and ill inability to urinate since 1 PM yesterday.  In the ER she had  a Snyder catheter placed now with around 450 cc of urine.  She has received Tylenol, Toradol, lidocaine patch, diazepam.  On reassessment patient's pain is improved, however when reperforming physical exam she does have some right leg weakness compared to the left with regards to lifting the leg off the bed.  Currently pending CT scan of the abdomen pelvis to rule out recurrence of metastatic malignancy as a cause of her acute urinary retention, however at this time we will also order a emergent MRI of the thoracic and lumbar spine to rule out cord compression.  EKG orthopedic pager which was forwarded to a respiratory type, page neurosurgery at [phone].  Pending response, unable to get in touch with MRI tech for expediting imaging at this time, will try again and then again at 9 AM with the supervisor Jabier to expedite imaging.  Patient okay with this plan. Love HAN, EM/IM PGY-4: spoke to orthopedics resident out of concern for cord compression will come to see patient Love HAN, EM/IM PGY-4: NSGY paged again, they are not on spine, ortho pager still forwarding to respiratory, spoke to answering service for orthopedics with attending callback, who reports he will forward the resident to me. Love HAN, EM/IM PGY-4: MRI and CT scans negative for acute spinal pathology. uncomplicated diverticulitis on CT abd but pt without abd pain, does not need abx per current guidelines regardless. Pt is able to stand and walk, requests more ibuprofen/tylenol but otherwise comfortable going home, will f/u with urology for angelo removal Dr. Jazmyn CASH: pt ambulating in ed, with improved pain, mri neg for cord compression. d/c with urgent neuroo surg appt

## 2024-10-12 NOTE — ED PROVIDER NOTE - NSFOLLOWUPINSTRUCTIONS_ED_ALL_ED_FT
For pain you can take Tylenol (Acetaminophen) up to 650mg every 6 hours and/or Ibuprofen (Motrin/Advil) up to 600mg every 6 hours.    Please follow with your Primary Care Doctor within 1 week.    Please follow up with Urology within 1 week for removal of Snyder catheter.     Please return to the ER if your pain worsens to the point you cannot  walk, you develop worsening weakness in the legs, or any other new or concerning symptoms such as shortness of breath or chest pain.

## 2024-10-12 NOTE — ED ADULT TRIAGE NOTE - CHIEF COMPLAINT QUOTE
Patient c/o right lower back pain that radiates down right leg x 1 day. Denies trauma and numbness/tingling. Hx. COPD and HTN.

## 2024-10-12 NOTE — ED ADULT NURSE NOTE - OBJECTIVE STATEMENT
Received pt in 10a, Pt is A&Ox4 with past medical history of COPD, HTN, Lung Ca status post chemo and surgery 2 years ago in remission, anxiety. Pt came to the ED due to 1 day of right lower back pain radiating down her right leg. Pt reports its sharp and worse when she walks.  Denies trauma or abnormal twisting or heavy lifting.  Patient states that she was walking outside and when she took a step she felt the pain.  Pt went to urgent care and was given cyclobenzaprine which she took without relief.  Pt took ibuprofen around 2 PM with some relief. Pt denies any trauma.   Pt states that she has been unable to urinate since about 1 PM and also unable to have a bowel movement and normally she pees relatively frequently. Pt denies fevers, chills, chest pain, shortness of breath, nausea, vomiting, constipation, diarrhea.  pt breathing is equal and nonlabored. Pt abdomen is soft and slightly distended. Pt safety maintained.

## 2024-10-12 NOTE — ED ADULT NURSE NOTE - NSFALLRISKINTERV_ED_ALL_ED

## 2024-10-12 NOTE — ED PROVIDER NOTE - DATE/TIME 1
Patient made aware that medication had been faxed to [facility] on [address]
Patient states she needs a call back in reference to getting something for anxiety for her upcoming CT scan being done tomorrow, 5/23/18. Please call to advise.  Thank you
12-Oct-2024 08:04

## 2024-10-12 NOTE — CHART NOTE - NSCHARTNOTEFT_GEN_A_CORE
Paged to see pt for r/o cord compression. Arrived in ED to find pt was in MRI. Please page when pt is back to room for ortho evaluation.     For all questions related to patient care, please reach out via the on-call pager.*     Marjan Pike PGY2  Orthopedic Surgery  [facility]: [phone]  [facility]: [phone]  [facility]: [phone]

## 2024-10-12 NOTE — ED ADULT NURSE NOTE - AS PAIN REST
Please contact your Primary Care Physician if you have a medical issue/question not related to your  knee surgery.  
7 (severe pain)

## 2024-10-12 NOTE — ED PROVIDER NOTE - NSPTACCESSSVCSAPPTDETAILS_ED_ALL_ED_FT
New angelo placed in ER needs removal within 1 week New angelo placed in ER needs removal within 1 week    neurosurg for worsening back pain please, severe stenosis

## 2024-10-12 NOTE — ED ADULT NURSE REASSESSMENT NOTE - NS ED NURSE REASSESS COMMENT FT1
Snyder inserted 14F using sterile technique with 300cc output of jace color urine. Urine collected and sent as ordered. pt tolerated procedure well. Pt has a 20g to the left AC, pt safety maintained

## 2024-10-12 NOTE — ED PROVIDER NOTE - ATTENDING CONTRIBUTION TO CARE
DR. CORTEZ, ATTENDING MD-  I performed a face to face bedside interview with the patient regarding history of present illness, review of symptoms and past medical history. I completed an independent physical exam.  I have discussed the patient's plan of care with the resident.   Documentation as above in the note.    65 y/o female h/o breast ca copd htn with r low back pain rad to rle x1 day.  Pos slr test.  Pt able to walk with pain.  Additional c/o unable to void urine or have bm since 1pm.  Non ttp spinous midline, distally nv intact, distal motor and sensation intact.  Eval for mets, uti, constipation with mass effect.  Obtain cbc cmp ua ucx ct a/p place angelo give pain med reassess.

## 2024-10-12 NOTE — ED ADULT NURSE REASSESSMENT NOTE - NS ED NURSE REASSESS COMMENT FT1
Received patient in room 9, awaiting CT scan/MRI. Patient resting in stretcher, no distress noted. Patient denies any pain or discomfort at this time. Patient is A&OX3, airway patent, breathing unlabored and even, radial pulses palpable, abdomen soft, nontender, angelo catheter w/urine bag noted. Side rails up and safety maintained. Fall precaution in place. Call bells within reach. Family at the bedside.

## 2024-10-12 NOTE — ED PROVIDER NOTE - PATIENT PORTAL LINK FT
You can access the FollowMyHealth Patient Portal offered by [facility] by registering at the following website: [email or web address]. By joining [employer]’s FollowMyHealth portal, you will also be able to view your health information using other applications (apps) compatible with our system.

## 2024-10-12 NOTE — ED PROVIDER NOTE - CLINICAL SUMMARY MEDICAL DECISION MAKING FREE TEXT BOX
Hussein Morris MD PGY-3: Hussein Morris MD PGY-3:  64-year-old female with PMH COPD not on home oxygen, hypertension, lung cancer status post chemo and surgery 2 years ago in remission, anxiety on buspirone presents with 1 day of right lower back pain radiating down her right leg described as sharp and worse when she walks.  Denies trauma or abnormal twisting or heavy lifting.  Patient states that she was walking outside and when she took a step she felt the pain.  Patient went to urgent care and was prescribed cyclobenzaprine which she took without relief.  Patient also took ibuprofen around 2 PM with some relief.  Denies saddle anesthesia, back injections, falls, trauma, leg weakness.  Denies IV drug use.  Denies fevers, chills, chest pain, shortness of breath, nausea, vomiting, constipation, diarrhea.  Patient also states that she has been unable to urinate since about 1 PM and also unable to have a bowel movement and normally she pees relatively frequently.    Vital Signs Stable  Gen: well appearing, NAD  HEENT: no conjunctivitis  Cardiac: regular rate rhythm, normal S1S2  Chest: CTA BL, no wheeze or crackles  Abdomen: normal BS, soft, NT  Extremity/MSK: +Right SLR, + right lower SI joint tenderness to palaption, no gross deformity, good perfusion  Skin: no rash  Neuro: grossly normal, 5/5 strength to dorsiflexion and plantarflexion b/l, normal sensation to LE b/l, normal gait    Bladder scan 344cc    Likely musculoskeletal pain/sciatica.  Low concern for cauda equina versus cord compression given 5 out of 5 strength to dorsiflexion and plantarflexion bilaterally normal sensation and no red flag symptoms all negative other than urinary and fecal retention and patient walked to the bathroom.  Differential includes kidney stone versus cystitis versus metastatic mass from history of lung cancer causing back pain and difficulty urinating.  Patient may also just be constipated and retaining secondary to that.  Will obtain labs, CT abdomen pelvis, CT lumbar spine, Place Snyder, check urine, analgesia.  Dispo pending clinical course and results.

## 2024-10-12 NOTE — ED ADULT NURSE REASSESSMENT NOTE - NS ED NURSE REASSESS COMMENT FT1
Break RN: Patient resting in stretcher; respirations even and unlabored, no signs/symptoms of acute distress. Patient denies dyspnea, shortness of breath, and chest pain. Patient denies pain and offers no complaints at this time. Safety measures in place; patient pending MRI results.

## 2024-10-12 NOTE — CONSULT NOTE ADULT - SUBJECTIVE AND OBJECTIVE BOX
HPI  64yFemale w/ HTN, HLD, osteoporosis, lung cancer s/p resection in remission x1 year c/o low back pain for 1 day. First noticed it yesterday around 9am. Describes low back pain with radiation to lateral RLE. Presented to the ED with concerns for urinary retention; last void had been at 1pm yesterday and she typically urinates very frequently. Denies weakness, numbness/tingling. Denies fevers/chills, weight loss, or night pain. Denies change in bowel function or saddle anesthesia. Denies recent falls/trauma or any other ortho injuries. Community ambulator w/o assistive devices at baseline.    ROS  Negative unless otherwise specified in HPI.    PAST MEDICAL & SURGICAL Hx  PAST MEDICAL & SURGICAL HISTORY:  Osteoporosis      History of COPD      Other nonspecific abnormal finding of lung field      H/O ovarian cystectomy      History of tonsillectomy          MEDICATIONS  Home Medications:  albuterol:  (20 Nov 2020 09:43)  busPIRone: orally 2 times a day (20 Nov 2020 09:43)  Calcium  1200+ d3 25mcg: orally once a day, Evening (20 Nov 2020 09:43)  Centrum oral tablet: 1 tab(s) orally once a day, Evening (20 Nov 2020 09:43)  Nicoderm: transdermal once a day (20 Nov 2020 09:43)  Trelegy: 100 microgram(s) inhaled once a day, AM (20 Nov 2020 09:43)  Vitamin D3 2000 intl units (50 mcg) oral tablet: orally every other day, Evening (20 Nov 2020 09:43)      ALLERGIES  No Known Allergies      FAMILY Hx  FAMILY HISTORY:      SOCIAL Hx  Social History:      VITALS  Vital Signs Last 24 Hrs  T(C): 36.1 (12 Oct 2024 11:08), Max: 36.7 (12 Oct 2024 06:45)  T(F): 97 (12 Oct 2024 11:08), Max: 98 (12 Oct 2024 06:45)  HR: 102 (12 Oct 2024 11:08) (95 - 105)  BP: 119/75 (12 Oct 2024 11:08) (119/75 - 163/83)  BP(mean): --  RR: 17 (12 Oct 2024 11:08) (17 - 18)  SpO2: 98% (12 Oct 2024 11:08) (98% - 98%)    Parameters below as of 12 Oct 2024 11:08  Patient On (Oxygen Delivery Method): room air        PHYSICAL EXAM  Gen: Lying in bed, NAD  Resp: No increased WOB  Spine:  Skin intact  No bony TTP or step-offs along c-, t-, l-spine, or sacrum      Motor:               L2             L3             L4               L5            S1  R         5/5           5/5          5/5             5/5           5/5  L          5/5          5/5           5/5             5/5           5/5    Sensory:               L2          L3         L4      L5       S1         (0=absent, 1=impaired, 2=normal, NT=not testable)  R         2            2            2        2        2  L          2            2           2        2         2    (+) Rectal tone, saddle/perianal SILT  (-) Straight leg raise test b/l  (-) Ankle clonus b/l    LABS                        13.3   18.14 )-----------( 267      ( 12 Oct 2024 06:30 )             37.5     10-12    127[L]  |  90[L]  |  13  ----------------------------<  132[H]  3.5   |  22  |  0.82    Ca    9.1      12 Oct 2024 06:30    TPro  7.3  /  Alb  4.1  /  TBili  1.0  /  DBili  x   /  AST  16  /  ALT  14  /  AlkPhos  69  10-12      ASSESSMENT & PLAN  64yFemale w/ lumbar stenosis and RLE radiculopathy. Low suspicion for conus medullaris/cauda equina syndromes or epidural abscess.  -WBAT  -f/u MRI official read  -pain control  -incentive spirometry  -PT/OT  -final recs pending imaging review and discussion with Dr. Merino    For all questions related to patient care, please reach out via the on-call pager.*     Marjan Pike PGY2  Orthopedic Surgery  [facility]: [phone]  [facility]: [id number]  [facility]: [id number]  HPI  64yFemale w/ HTN, HLD, osteoporosis, lung cancer s/p resection in remission x1 year c/o low back pain for 1 day. First noticed it yesterday around 9am. Describes low back pain with radiation to lateral RLE. Presented to the ED with concerns for urinary retention; last void had been at 1pm yesterday and she typically urinates very frequently. Denies weakness, numbness/tingling. Denies fevers/chills, weight loss, or night pain. Denies change in bowel function or saddle anesthesia. Denies recent falls/trauma or any other ortho injuries. Community ambulator w/o assistive devices at baseline.    ROS  Negative unless otherwise specified in HPI.    PAST MEDICAL & SURGICAL Hx  PAST MEDICAL & SURGICAL HISTORY:  Osteoporosis      History of COPD      Other nonspecific abnormal finding of lung field      H/O ovarian cystectomy      History of tonsillectomy          MEDICATIONS  Home Medications:  albuterol:  (20 Nov 2020 09:43)  busPIRone: orally 2 times a day (20 Nov 2020 09:43)  Calcium  1200+ d3 25mcg: orally once a day, Evening (20 Nov 2020 09:43)  Centrum oral tablet: 1 tab(s) orally once a day, Evening (20 Nov 2020 09:43)  Nicoderm: transdermal once a day (20 Nov 2020 09:43)  Trelegy: 100 microgram(s) inhaled once a day, AM (20 Nov 2020 09:43)  Vitamin D3 2000 intl units (50 mcg) oral tablet: orally every other day, Evening (20 Nov 2020 09:43)      ALLERGIES  No Known Allergies      FAMILY Hx  FAMILY HISTORY:      SOCIAL Hx  Social History:      VITALS  Vital Signs Last 24 Hrs  T(C): 36.1 (12 Oct 2024 11:08), Max: 36.7 (12 Oct 2024 06:45)  T(F): 97 (12 Oct 2024 11:08), Max: 98 (12 Oct 2024 06:45)  HR: 102 (12 Oct 2024 11:08) (95 - 105)  BP: 119/75 (12 Oct 2024 11:08) (119/75 - 163/83)  BP(mean): --  RR: 17 (12 Oct 2024 11:08) (17 - 18)  SpO2: 98% (12 Oct 2024 11:08) (98% - 98%)    Parameters below as of 12 Oct 2024 11:08  Patient On (Oxygen Delivery Method): room air        PHYSICAL EXAM  Gen: Lying in bed, NAD  Resp: No increased WOB  Spine:  Skin intact  No bony TTP or step-offs along c-, t-, l-spine, or sacrum      Motor:               L2             L3             L4               L5            S1  R         5/5           5/5          5/5             5/5           5/5  L          5/5          5/5           5/5             5/5           5/5    Sensory:               L2          L3         L4      L5       S1         (0=absent, 1=impaired, 2=normal, NT=not testable)  R         2            2            2        2        2  L          2            2           2        2         2    (+) Rectal tone, saddle/perianal SILT  (-) Straight leg raise test b/l  (-) Ankle clonus b/l    LABS                        13.3   18.14 )-----------( 267      ( 12 Oct 2024 06:30 )             37.5     10-12    127[L]  |  90[L]  |  13  ----------------------------<  132[H]  3.5   |  22  |  0.82    Ca    9.1      12 Oct 2024 06:30    TPro  7.3  /  Alb  4.1  /  TBili  1.0  /  DBili  x   /  AST  16  /  ALT  14  /  AlkPhos  69  10-12      ASSESSMENT & PLAN  64yFemale w/ lumbar stenosis and RLE radiculopathy. Low suspicion for conus medullaris/cauda equina syndromes or epidural abscess.  -WBAT  -pain control  -incentive spirometry  -PT/OT  -No concern to metastatic ds or cord compression.   -No further ortho intervention indicated  -F/u outpatient with Dr. Merino    For all questions related to patient care, please reach out via the on-call pager.*     Marjan Pike PGY2  Orthopedic Surgery  [facility]: [phone]  [facility]: [id number]  [facility]: [id number]

## 2024-10-16 ENCOUNTER — EMERGENCY (EMERGENCY)
Facility: HOSPITAL | Age: 64
LOS: 1 days | Discharge: HOME | End: 2024-10-16
Admitting: EMERGENCY MEDICINE
Payer: COMMERCIAL

## 2024-10-16 VITALS
DIASTOLIC BLOOD PRESSURE: 84 MMHG | OXYGEN SATURATION: 98 % | RESPIRATION RATE: 18 BRPM | SYSTOLIC BLOOD PRESSURE: 129 MMHG | HEART RATE: 116 BPM | TEMPERATURE: 98 F

## 2024-10-16 VITALS
OXYGEN SATURATION: 97 % | WEIGHT: 179.9 LBS | HEIGHT: 62 IN | RESPIRATION RATE: 16 BRPM | TEMPERATURE: 98 F | SYSTOLIC BLOOD PRESSURE: 126 MMHG | DIASTOLIC BLOOD PRESSURE: 73 MMHG | HEART RATE: 111 BPM

## 2024-10-16 DIAGNOSIS — Z90.89 ACQUIRED ABSENCE OF OTHER ORGANS: Chronic | ICD-10-CM

## 2024-10-16 DIAGNOSIS — Z98.890 OTHER SPECIFIED POSTPROCEDURAL STATES: Chronic | ICD-10-CM

## 2024-10-16 LAB
ALBUMIN SERPL ELPH-MCNC: 3.8 G/DL — SIGNIFICANT CHANGE UP (ref 3.3–5)
ALP SERPL-CCNC: 81 U/L — SIGNIFICANT CHANGE UP (ref 40–120)
ALT FLD-CCNC: 26 U/L — SIGNIFICANT CHANGE UP (ref 4–33)
ANION GAP SERPL CALC-SCNC: 18 MMOL/L — HIGH (ref 7–14)
ANION GAP SERPL CALC-SCNC: 20 MMOL/L — HIGH (ref 7–14)
AST SERPL-CCNC: 24 U/L — SIGNIFICANT CHANGE UP (ref 4–32)
BASOPHILS # BLD AUTO: 0.07 K/UL — SIGNIFICANT CHANGE UP (ref 0–0.2)
BASOPHILS NFR BLD AUTO: 0.4 % — SIGNIFICANT CHANGE UP (ref 0–2)
BILIRUB SERPL-MCNC: 0.3 MG/DL — SIGNIFICANT CHANGE UP (ref 0.2–1.2)
BUN SERPL-MCNC: 35 MG/DL — HIGH (ref 7–23)
BUN SERPL-MCNC: 36 MG/DL — HIGH (ref 7–23)
CALCIUM SERPL-MCNC: 9.1 MG/DL — SIGNIFICANT CHANGE UP (ref 8.4–10.5)
CALCIUM SERPL-MCNC: 9.7 MG/DL — SIGNIFICANT CHANGE UP (ref 8.4–10.5)
CHLORIDE SERPL-SCNC: 89 MMOL/L — LOW (ref 98–107)
CHLORIDE SERPL-SCNC: 91 MMOL/L — LOW (ref 98–107)
CO2 SERPL-SCNC: 18 MMOL/L — LOW (ref 22–31)
CO2 SERPL-SCNC: 19 MMOL/L — LOW (ref 22–31)
CREAT SERPL-MCNC: 1.42 MG/DL — HIGH (ref 0.5–1.3)
CREAT SERPL-MCNC: 1.45 MG/DL — HIGH (ref 0.5–1.3)
EGFR: 40 ML/MIN/1.73M2 — LOW
EGFR: 40 ML/MIN/1.73M2 — LOW
EGFR: 41 ML/MIN/1.73M2 — LOW
EGFR: 41 ML/MIN/1.73M2 — LOW
EOSINOPHIL # BLD AUTO: 0.16 K/UL — SIGNIFICANT CHANGE UP (ref 0–0.5)
EOSINOPHIL NFR BLD AUTO: 1 % — SIGNIFICANT CHANGE UP (ref 0–6)
GLUCOSE SERPL-MCNC: 91 MG/DL — SIGNIFICANT CHANGE UP (ref 70–99)
GLUCOSE SERPL-MCNC: 92 MG/DL — SIGNIFICANT CHANGE UP (ref 70–99)
HCT VFR BLD CALC: 36.1 % — SIGNIFICANT CHANGE UP (ref 34.5–45)
HGB BLD-MCNC: 13 G/DL — SIGNIFICANT CHANGE UP (ref 11.5–15.5)
IANC: 12.44 K/UL — HIGH (ref 1.8–7.4)
IMM GRANULOCYTES NFR BLD AUTO: 1.1 % — HIGH (ref 0–0.9)
LYMPHOCYTES # BLD AUTO: 1.04 K/UL — SIGNIFICANT CHANGE UP (ref 1–3.3)
LYMPHOCYTES # BLD AUTO: 6.6 % — LOW (ref 13–44)
MCHC RBC-ENTMCNC: 33.3 PG — SIGNIFICANT CHANGE UP (ref 27–34)
MCHC RBC-ENTMCNC: 36 GM/DL — SIGNIFICANT CHANGE UP (ref 32–36)
MCV RBC AUTO: 92.6 FL — SIGNIFICANT CHANGE UP (ref 80–100)
MONOCYTES # BLD AUTO: 1.92 K/UL — HIGH (ref 0–0.9)
MONOCYTES NFR BLD AUTO: 12.2 % — SIGNIFICANT CHANGE UP (ref 2–14)
NEUTROPHILS # BLD AUTO: 12.44 K/UL — HIGH (ref 1.8–7.4)
NEUTROPHILS NFR BLD AUTO: 78.7 % — HIGH (ref 43–77)
NRBC # BLD AUTO: 0 K/UL — SIGNIFICANT CHANGE UP (ref 0–0)
NRBC # BLD: 0 /100 WBCS — SIGNIFICANT CHANGE UP (ref 0–0)
NRBC # FLD: 0 K/UL — SIGNIFICANT CHANGE UP (ref 0–0)
NRBC BLD-RTO: 0 /100 WBCS — SIGNIFICANT CHANGE UP (ref 0–0)
PLATELET # BLD AUTO: 359 K/UL — SIGNIFICANT CHANGE UP (ref 150–400)
POTASSIUM SERPL-MCNC: 3.1 MMOL/L — LOW (ref 3.5–5.3)
POTASSIUM SERPL-MCNC: 3.2 MMOL/L — LOW (ref 3.5–5.3)
POTASSIUM SERPL-SCNC: 3.1 MMOL/L — LOW (ref 3.5–5.3)
POTASSIUM SERPL-SCNC: 3.2 MMOL/L — LOW (ref 3.5–5.3)
PROT SERPL-MCNC: 7.5 G/DL — SIGNIFICANT CHANGE UP (ref 6–8.3)
RBC # BLD: 3.9 M/UL — SIGNIFICANT CHANGE UP (ref 3.8–5.2)
RBC # FLD: 12.3 % — SIGNIFICANT CHANGE UP (ref 10.3–14.5)
SODIUM SERPL-SCNC: 127 MMOL/L — LOW (ref 135–145)
SODIUM SERPL-SCNC: 128 MMOL/L — LOW (ref 135–145)
WBC # BLD: 15.8 K/UL — HIGH (ref 3.8–10.5)
WBC # FLD AUTO: 15.8 K/UL — HIGH (ref 3.8–10.5)

## 2024-10-16 PROCEDURE — 99284 EMERGENCY DEPT VISIT MOD MDM: CPT

## 2024-10-16 RX ORDER — ACETAMINOPHEN 500 MG/5ML
2 LIQUID (ML) ORAL
Qty: 56 | Refills: 0
Start: 2024-10-16 | End: 2024-10-22

## 2024-10-16 RX ORDER — OXYCODONE HYDROCHLORIDE 30 MG/1
1 TABLET ORAL
Qty: 9 | Refills: 0
Start: 2024-10-16 | End: 2024-10-18

## 2024-10-16 RX ORDER — KETOROLAC TROMETHAMINE 30 MG/ML
15 INJECTION, SOLUTION INTRAMUSCULAR; INTRAVENOUS ONCE
Refills: 0 | Status: DISCONTINUED | OUTPATIENT
Start: 2024-10-16 | End: 2024-10-16

## 2024-10-16 RX ADMIN — Medication 1000 MILLILITER(S): at 20:33

## 2024-10-16 RX ADMIN — Medication 40 MILLIEQUIVALENT(S): at 21:44

## 2024-10-16 RX ADMIN — KETOROLAC TROMETHAMINE 15 MILLIGRAM(S): 30 INJECTION, SOLUTION INTRAMUSCULAR; INTRAVENOUS at 20:33

## 2024-10-21 ENCOUNTER — APPOINTMENT (OUTPATIENT)
Dept: ORTHOPEDIC SURGERY | Facility: CLINIC | Age: 64
End: 2024-10-21
Payer: MEDICARE

## 2024-10-21 VITALS — WEIGHT: 180 LBS | HEIGHT: 63 IN | BODY MASS INDEX: 31.89 KG/M2

## 2024-10-21 DIAGNOSIS — M54.16 RADICULOPATHY, LUMBAR REGION: ICD-10-CM

## 2024-10-21 PROBLEM — M48.07 LUMBOSACRAL STENOSIS: Status: ACTIVE | Noted: 2024-10-21

## 2024-10-21 PROCEDURE — 99203 OFFICE O/P NEW LOW 30 MIN: CPT

## 2024-10-21 RX ORDER — METHYLPREDNISOLONE 4 MG/1
4 TABLET ORAL
Qty: 1 | Refills: 0 | Status: ACTIVE | COMMUNITY
Start: 2024-10-21 | End: 1900-01-01

## 2024-10-23 ENCOUNTER — OUTPATIENT (OUTPATIENT)
Dept: OUTPATIENT SERVICES | Facility: HOSPITAL | Age: 64
LOS: 1 days | End: 2024-10-23
Payer: MEDICARE

## 2024-10-23 ENCOUNTER — APPOINTMENT (OUTPATIENT)
Dept: MRI IMAGING | Facility: CLINIC | Age: 64
End: 2024-10-23
Payer: MEDICARE

## 2024-10-23 DIAGNOSIS — Z00.00 ENCOUNTER FOR GENERAL ADULT MEDICAL EXAMINATION WITHOUT ABNORMAL FINDINGS: ICD-10-CM

## 2024-10-23 DIAGNOSIS — Z98.890 OTHER SPECIFIED POSTPROCEDURAL STATES: Chronic | ICD-10-CM

## 2024-10-23 DIAGNOSIS — Z90.89 ACQUIRED ABSENCE OF OTHER ORGANS: Chronic | ICD-10-CM

## 2024-10-23 LAB — ERYTHROCYTE [SEDIMENTATION RATE] IN BLOOD BY WESTERGREN METHOD: 67 MM/HR

## 2024-10-23 PROCEDURE — 72156 MRI NECK SPINE W/O & W/DYE: CPT | Mod: 26,MH

## 2024-10-24 LAB — CRP SERPL-MCNC: 52 MG/L

## 2024-10-28 ENCOUNTER — APPOINTMENT (OUTPATIENT)
Age: 64
End: 2024-10-28

## 2024-10-30 ENCOUNTER — APPOINTMENT (OUTPATIENT)
Dept: ORTHOPEDIC SURGERY | Facility: CLINIC | Age: 64
End: 2024-10-30
Payer: MEDICARE

## 2024-10-30 VITALS — WEIGHT: 180 LBS | BODY MASS INDEX: 31.89 KG/M2 | HEIGHT: 63 IN

## 2024-10-30 DIAGNOSIS — M48.07 SPINAL STENOSIS, LUMBOSACRAL REGION: ICD-10-CM

## 2024-10-30 PROCEDURE — 99214 OFFICE O/P EST MOD 30 MIN: CPT

## 2024-11-20 PROCEDURE — A9585: CPT

## 2024-11-20 PROCEDURE — 72156 MRI NECK SPINE W/O & W/DYE: CPT

## 2024-11-30 ENCOUNTER — OUTPATIENT (OUTPATIENT)
Dept: OUTPATIENT SERVICES | Facility: HOSPITAL | Age: 64
LOS: 1 days | End: 2024-11-30
Payer: MEDICARE

## 2024-11-30 ENCOUNTER — APPOINTMENT (OUTPATIENT)
Dept: MRI IMAGING | Facility: IMAGING CENTER | Age: 64
End: 2024-11-30

## 2024-11-30 DIAGNOSIS — M48.07 SPINAL STENOSIS, LUMBOSACRAL REGION: ICD-10-CM

## 2024-11-30 DIAGNOSIS — Z90.89 ACQUIRED ABSENCE OF OTHER ORGANS: Chronic | ICD-10-CM

## 2024-11-30 DIAGNOSIS — Z98.890 OTHER SPECIFIED POSTPROCEDURAL STATES: Chronic | ICD-10-CM

## 2024-11-30 PROCEDURE — 72158 MRI LUMBAR SPINE W/O & W/DYE: CPT

## 2024-11-30 PROCEDURE — A9585: CPT

## 2024-11-30 PROCEDURE — 72158 MRI LUMBAR SPINE W/O & W/DYE: CPT | Mod: 26,MH

## 2024-12-06 ENCOUNTER — APPOINTMENT (OUTPATIENT)
Dept: ORTHOPEDIC SURGERY | Facility: CLINIC | Age: 64
End: 2024-12-06
Payer: MEDICARE

## 2024-12-06 DIAGNOSIS — M48.07 SPINAL STENOSIS, LUMBOSACRAL REGION: ICD-10-CM

## 2024-12-06 PROCEDURE — 99214 OFFICE O/P EST MOD 30 MIN: CPT

## 2024-12-08 ENCOUNTER — INPATIENT (INPATIENT)
Facility: HOSPITAL | Age: 64
LOS: 9 days | Discharge: HOME | End: 2024-12-18
Attending: INTERNAL MEDICINE | Admitting: INTERNAL MEDICINE
Payer: MEDICARE

## 2024-12-08 VITALS
RESPIRATION RATE: 18 BRPM | TEMPERATURE: 98 F | SYSTOLIC BLOOD PRESSURE: 120 MMHG | HEART RATE: 89 BPM | HEIGHT: 62 IN | OXYGEN SATURATION: 99 % | DIASTOLIC BLOOD PRESSURE: 76 MMHG

## 2024-12-08 DIAGNOSIS — Z98.890 OTHER SPECIFIED POSTPROCEDURAL STATES: Chronic | ICD-10-CM

## 2024-12-08 DIAGNOSIS — Z29.9 ENCOUNTER FOR PROPHYLACTIC MEASURES, UNSPECIFIED: ICD-10-CM

## 2024-12-08 DIAGNOSIS — R09.89 OTHER SPECIFIED SYMPTOMS AND SIGNS INVOLVING THE CIRCULATORY AND RESPIRATORY SYSTEMS: ICD-10-CM

## 2024-12-08 DIAGNOSIS — C34.90 MALIGNANT NEOPLASM OF UNSPECIFIED PART OF UNSPECIFIED BRONCHUS OR LUNG: ICD-10-CM

## 2024-12-08 DIAGNOSIS — Z90.89 ACQUIRED ABSENCE OF OTHER ORGANS: Chronic | ICD-10-CM

## 2024-12-08 DIAGNOSIS — I10 ESSENTIAL (PRIMARY) HYPERTENSION: ICD-10-CM

## 2024-12-08 DIAGNOSIS — G06.2 EXTRADURAL AND SUBDURAL ABSCESS, UNSPECIFIED: ICD-10-CM

## 2024-12-08 DIAGNOSIS — M46.20 OSTEOMYELITIS OF VERTEBRA, SITE UNSPECIFIED: ICD-10-CM

## 2024-12-08 LAB
ALBUMIN SERPL ELPH-MCNC: 4.3 G/DL — SIGNIFICANT CHANGE UP (ref 3.3–5)
ALP SERPL-CCNC: 87 U/L — SIGNIFICANT CHANGE UP (ref 40–120)
ALT FLD-CCNC: 16 U/L — SIGNIFICANT CHANGE UP (ref 4–33)
ANION GAP SERPL CALC-SCNC: 12 MMOL/L — SIGNIFICANT CHANGE UP (ref 7–14)
APTT BLD: 31.2 SEC — SIGNIFICANT CHANGE UP (ref 24.5–35.6)
AST SERPL-CCNC: 16 U/L — SIGNIFICANT CHANGE UP (ref 4–32)
BASOPHILS # BLD AUTO: 0.08 K/UL — SIGNIFICANT CHANGE UP (ref 0–0.2)
BASOPHILS NFR BLD AUTO: 1 % — SIGNIFICANT CHANGE UP (ref 0–2)
BILIRUB SERPL-MCNC: 0.5 MG/DL — SIGNIFICANT CHANGE UP (ref 0.2–1.2)
BLD GP AB SCN SERPL QL: NEGATIVE — SIGNIFICANT CHANGE UP
BUN SERPL-MCNC: 17 MG/DL — SIGNIFICANT CHANGE UP (ref 7–23)
CALCIUM SERPL-MCNC: 9.8 MG/DL — SIGNIFICANT CHANGE UP (ref 8.4–10.5)
CHLORIDE SERPL-SCNC: 104 MMOL/L — SIGNIFICANT CHANGE UP (ref 98–107)
CO2 SERPL-SCNC: 25 MMOL/L — SIGNIFICANT CHANGE UP (ref 22–31)
CREAT SERPL-MCNC: 0.91 MG/DL — SIGNIFICANT CHANGE UP (ref 0.5–1.3)
CRP SERPL-MCNC: 9.3 MG/L — HIGH
EGFR: 70 ML/MIN/1.73M2 — SIGNIFICANT CHANGE UP
EOSINOPHIL # BLD AUTO: 0.34 K/UL — SIGNIFICANT CHANGE UP (ref 0–0.5)
EOSINOPHIL NFR BLD AUTO: 4.3 % — SIGNIFICANT CHANGE UP (ref 0–6)
ERYTHROCYTE [SEDIMENTATION RATE] IN BLOOD: 46 MM/HR — HIGH (ref 4–25)
FLUAV AG NPH QL: SIGNIFICANT CHANGE UP
FLUBV AG NPH QL: SIGNIFICANT CHANGE UP
GAS PNL BLDV: SIGNIFICANT CHANGE UP
GLUCOSE SERPL-MCNC: 96 MG/DL — SIGNIFICANT CHANGE UP (ref 70–99)
HCT VFR BLD CALC: 38 % — SIGNIFICANT CHANGE UP (ref 34.5–45)
HGB BLD-MCNC: 12.6 G/DL — SIGNIFICANT CHANGE UP (ref 11.5–15.5)
IANC: 5.1 K/UL — SIGNIFICANT CHANGE UP (ref 1.8–7.4)
IMM GRANULOCYTES NFR BLD AUTO: 0.4 % — SIGNIFICANT CHANGE UP (ref 0–0.9)
INR BLD: 0.92 RATIO — SIGNIFICANT CHANGE UP (ref 0.85–1.16)
LYMPHOCYTES # BLD AUTO: 1.54 K/UL — SIGNIFICANT CHANGE UP (ref 1–3.3)
LYMPHOCYTES # BLD AUTO: 19.3 % — SIGNIFICANT CHANGE UP (ref 13–44)
MCHC RBC-ENTMCNC: 31.8 PG — SIGNIFICANT CHANGE UP (ref 27–34)
MCHC RBC-ENTMCNC: 33.2 G/DL — SIGNIFICANT CHANGE UP (ref 32–36)
MCV RBC AUTO: 96 FL — SIGNIFICANT CHANGE UP (ref 80–100)
MONOCYTES # BLD AUTO: 0.87 K/UL — SIGNIFICANT CHANGE UP (ref 0–0.9)
MONOCYTES NFR BLD AUTO: 10.9 % — SIGNIFICANT CHANGE UP (ref 2–14)
NEUTROPHILS # BLD AUTO: 5.1 K/UL — SIGNIFICANT CHANGE UP (ref 1.8–7.4)
NEUTROPHILS NFR BLD AUTO: 64.1 % — SIGNIFICANT CHANGE UP (ref 43–77)
NRBC # BLD: 0 /100 WBCS — SIGNIFICANT CHANGE UP (ref 0–0)
NRBC # FLD: 0 K/UL — SIGNIFICANT CHANGE UP (ref 0–0)
PLATELET # BLD AUTO: 394 K/UL — SIGNIFICANT CHANGE UP (ref 150–400)
POTASSIUM SERPL-MCNC: 4.2 MMOL/L — SIGNIFICANT CHANGE UP (ref 3.5–5.3)
POTASSIUM SERPL-SCNC: 4.2 MMOL/L — SIGNIFICANT CHANGE UP (ref 3.5–5.3)
PROT SERPL-MCNC: 7.5 G/DL — SIGNIFICANT CHANGE UP (ref 6–8.3)
PROTHROM AB SERPL-ACNC: 10.9 SEC — SIGNIFICANT CHANGE UP (ref 9.9–13.4)
RBC # BLD: 3.96 M/UL — SIGNIFICANT CHANGE UP (ref 3.8–5.2)
RBC # FLD: 12.7 % — SIGNIFICANT CHANGE UP (ref 10.3–14.5)
RH IG SCN BLD-IMP: POSITIVE — SIGNIFICANT CHANGE UP
RSV RNA NPH QL NAA+NON-PROBE: SIGNIFICANT CHANGE UP
SARS-COV-2 RNA SPEC QL NAA+PROBE: SIGNIFICANT CHANGE UP
SODIUM SERPL-SCNC: 141 MMOL/L — SIGNIFICANT CHANGE UP (ref 135–145)
WBC # BLD: 7.96 K/UL — SIGNIFICANT CHANGE UP (ref 3.8–10.5)
WBC # FLD AUTO: 7.96 K/UL — SIGNIFICANT CHANGE UP (ref 3.8–10.5)

## 2024-12-08 PROCEDURE — 99223 1ST HOSP IP/OBS HIGH 75: CPT

## 2024-12-08 PROCEDURE — 71046 X-RAY EXAM CHEST 2 VIEWS: CPT | Mod: 26

## 2024-12-08 PROCEDURE — 99285 EMERGENCY DEPT VISIT HI MDM: CPT | Mod: GC

## 2024-12-08 RX ORDER — METOPROLOL TARTRATE 100 MG/1
2 TABLET, FILM COATED ORAL
Refills: 0 | DISCHARGE

## 2024-12-08 RX ORDER — BUSPIRONE HCL 15 MG
5 TABLET ORAL
Refills: 0 | Status: DISCONTINUED | OUTPATIENT
Start: 2024-12-08 | End: 2024-12-18

## 2024-12-08 RX ORDER — ACETAMINOPHEN 500MG 500 MG/1
650 TABLET, COATED ORAL EVERY 6 HOURS
Refills: 0 | Status: DISCONTINUED | OUTPATIENT
Start: 2024-12-08 | End: 2024-12-18

## 2024-12-08 RX ORDER — VANCOMYCIN HCL 900 MCG/MG
1000 POWDER (GRAM) MISCELLANEOUS ONCE
Refills: 0 | Status: DISCONTINUED | OUTPATIENT
Start: 2024-12-08 | End: 2024-12-08

## 2024-12-08 RX ORDER — BUSPIRONE HCL 15 MG
1 TABLET ORAL
Refills: 0 | DISCHARGE

## 2024-12-08 RX ORDER — METOPROLOL TARTRATE 100 MG/1
25 TABLET, FILM COATED ORAL
Refills: 0 | Status: DISCONTINUED | OUTPATIENT
Start: 2024-12-08 | End: 2024-12-18

## 2024-12-08 RX ORDER — ACETAMINOPHEN, DIPHENHYDRAMINE HCL, PHENYLEPHRINE HCL 325; 25; 5 MG/1; MG/1; MG/1
3 TABLET ORAL AT BEDTIME
Refills: 0 | Status: DISCONTINUED | OUTPATIENT
Start: 2024-12-08 | End: 2024-12-18

## 2024-12-08 RX ORDER — INFLUENZA VIRUS VACCINE 15; 15; 15; 15 UG/.5ML; UG/.5ML; UG/.5ML; UG/.5ML
0.5 SUSPENSION INTRAMUSCULAR ONCE
Refills: 0 | Status: DISCONTINUED | OUTPATIENT
Start: 2024-12-08 | End: 2024-12-18

## 2024-12-08 RX ORDER — FLUTICASONE PROPIONATE AND SALMETEROL XINAFOATE 45; 21 UG/1; UG/1
1 AEROSOL, METERED RESPIRATORY (INHALATION)
Refills: 0 | Status: DISCONTINUED | OUTPATIENT
Start: 2024-12-08 | End: 2024-12-18

## 2024-12-08 RX ORDER — FLUTICASONE FUROATE, UMECLIDINIUM BROMIDE AND VILANTEROL TRIFENATATE 100; 62.5; 25 UG/1; UG/1; UG/1
1 POWDER RESPIRATORY (INHALATION)
Refills: 0 | DISCHARGE

## 2024-12-08 RX ORDER — CEFTRIAXONE SODIUM 1 G
2000 VIAL (EA) INJECTION ONCE
Refills: 0 | Status: DISCONTINUED | OUTPATIENT
Start: 2024-12-08 | End: 2024-12-08

## 2024-12-08 RX ADMIN — Medication 5 MILLIGRAM(S): at 21:48

## 2024-12-08 NOTE — ED PROVIDER NOTE - ATTENDING CONTRIBUTION TO CARE
63yo F w ho of COPD htn sent in by dr xavier, ortho/spine for abnormal MRI findings of possible septic arthritis, OM, epidural abscess, pt with right sided sciatica for somet lidia but improved, no fevers, chills, no numbness, weakness of lower extremities, no urinary sx  examw ith normal motor and sensory exam  will chck labs, dw ortho re dispo

## 2024-12-08 NOTE — CONSULT NOTE ADULT - SUBJECTIVE AND OBJECTIVE BOX
HPI  64yFemale sent to [facility] ED from Dr. Merino's office for L5-S1 facet septic arthritis and epidural abscess noted on recent MRI. Concern for infection began in October when MRI showed ambiguous findings of infection at L5-S1. Repeat imaging this week revealed progression of infection so she was sent in for infectious workup. She has had RLE radiculopathy for several years. She has not noticed her symptoms to have gotten any worse recently. She has been able to walk without difficulty.Denies focal weakness, numbness/tingling, or radicular sxs. Denies fevers/chills, acute changes in bowel/bladder function, or saddle anesthesia. Denies recent falls/trauma or any other ortho injuries. Community ambulator w/o assistive devices at baseline. She does report recent infection of UTI which was treated with PO abx.     AFVSS  WBC 7.9  ESR/CRP: 46/9.3    ROS  Negative unless otherwise specified in HPI.    PAST MEDICAL & SURGICAL Hx  PAST MEDICAL & SURGICAL HISTORY:  Osteoporosis      History of COPD      Other nonspecific abnormal finding of lung field      H/O ovarian cystectomy      History of tonsillectomy          MEDICATIONS  Home Medications:  albuterol:  (20 Nov 2020 09:43)  busPIRone: orally 2 times a day (20 Nov 2020 09:43)  Calcium  1200+ d3 25mcg: orally once a day, Evening (20 Nov 2020 09:43)  Centrum oral tablet: 1 tab(s) orally once a day, Evening (20 Nov 2020 09:43)  Nicoderm: transdermal once a day (20 Nov 2020 09:43)  Trelegy: 100 microgram(s) inhaled once a day, AM (20 Nov 2020 09:43)  Vitamin D3 2000 intl units (50 mcg) oral tablet: orally every other day, Evening (20 Nov 2020 09:43)      ALLERGIES  No Known Allergies      FAMILY Hx  FAMILY HISTORY:      SOCIAL Hx  Social History:      VITALS  Vital Signs Last 24 Hrs  T(C): 36.6 (08 Dec 2024 14:32), Max: 36.6 (08 Dec 2024 14:32)  T(F): 97.9 (08 Dec 2024 14:32), Max: 97.9 (08 Dec 2024 14:32)  HR: 92 (08 Dec 2024 14:32) (78 - 92)  BP: 117/69 (08 Dec 2024 14:32) (112/73 - 120/76)  BP(mean): --  RR: 18 (08 Dec 2024 14:32) (18 - 18)  SpO2: 97% (08 Dec 2024 14:32) (97% - 99%)    Parameters below as of 08 Dec 2024 14:32  Patient On (Oxygen Delivery Method): room air        PHYSICAL EXAM  Gen: Lying in bed, NAD  Resp: No increased WOB  Spine:  Skin intact  No bony TTP or step-offs along c-, t-, l-spine, or sacrum    Motor:               L2             L3             L4               L5            S1  R         5/5           5/5          5/5             5/5           5/5  L          5/5          5/5           5/5             5/5           5/5    Sensory:             L2          L3         L4      L5       S1         (0=absent, 1=impaired, 2=normal, NT=not testable)  R         2            2            2        2        2  L          2            2           2        2         2      LABS                        12.6   7.96  )-----------( 394      ( 08 Dec 2024 11:20 )             38.0     12-08    141  |  104  |  17  ----------------------------<  96  4.2   |  25  |  0.91    Ca    9.8      08 Dec 2024 11:20    TPro  7.5  /  Alb  4.3  /  TBili  0.5  /  DBili  x   /  AST  16  /  ALT  16  /  AlkPhos  87  12-08    PT/INR - ( 08 Dec 2024 11:20 )   PT: 10.9 sec;   INR: 0.92 ratio         PTT - ( 08 Dec 2024 11:20 )  PTT:31.2 sec    IMAGING  XAM: [id number] - MR SPINE LUMBAR WAW IC  - ORDERED BY: BAKARI MERINO      PROCEDURE DATE:  11/30/2024           INTERPRETATION:  CLINICAL INFORMATION: Follow-up inflammation on prior MRI    ADDITIONAL CLINICAL INFORMATION: Low back muscle strain S39.012A    TECHNIQUE: Multiplanar, multisequence MRI was performed of the lumbar   spine.  IV Contrast: Gadavist  8 cc administered   2 cc discarded    PRIOR STUDIES: Lumbar Spine MRI 10/12/2024    FINDINGS:    LOCALIZER: No additional findings.  BONES/ALIGNMENT: There is decreased T1 marrow signal with associated   edema and enhancement of the right L5/S1 facet extending into the right   L5 pedicle and right sacral ala. There is periarticular ill-defined edema   and enhancement present. There is interval development of erosive changes   of the right L5/S1 facet. Findings are most consistent with septic   arthritis/osteomyelitis of the right L5/S1 facet articulation. There is   diffuse dorsal epidural enhancement spanning L5 to the sacrum which is   imaged up to the level of S3. There is a focus of curvilinear   hyperintense STIR signal which displays post contrast enhancement within   the posterior epidural space at the level of L5/S1 favored to represent   measuring 1.8 x 0.4 x 0.4 cm (series 5, image 12) as well as additional   thin rim-enhancing fluid collection at the level of S3 measuring 2.1 x   0.4 x 0.3 cm, which is new from prior exam and appears separate from the   sacral Tarlov cysts, concerning for an epidural abscess (series5 and 10,   image 12 and series 11, image 61). There appears to be focal area of   inflamed epidural fat anteriorly on the right along the posterior   inferior aspect of the L5 vertebral body. Broad dextroconvexity of the   lumbar spine with asymmetric loss of intervertebral disc height on the   left at L2/L3 with mild degenerative endplate enhancement asymmetric to   the left at this level, unchanged from prior exam. Slight leftward   lateral listhesis of L1 on L2. There is trace retrolisthesis of L1 on L2   and L2 on L3.  SACROILIAC JOINTS/SACRUM: There is no sacral fracture. The SI joints are   partially visualized but are intact.  CONUS AND CAUDA EQUINA: The distal cord and conus are normal in signal.   Conus terminates at L1/L2. There isenhancement of the bilateral exiting   S1 nerves, right greater than left and of the exiting right L5 nerve,   most consistent with inflammation. There is dorsal epidural enhancement   spanning L5 to the imaged distal sacrum at S3. There is epidural   lipomatosis. Tarlov cysts.  VISUALIZED INTRAPELVIC/INTRA-ABDOMINAL SOFT TISSUES: Normal.  PARASPINAL SOFT TISSUES: Ill-defined edema within the right paraspinal   musculature spanning L4-S1.      INDIVIDUAL LEVELS:    LOWER THORACIC SPINE: No spinalcanal or neuroforaminal stenosis.    L1-L2: Disc bulge resulting in indentation of the thecal sac and mild   foraminal narrowing.  L2-L3: Small disc osteophyte complex with prominence of the posterior   epidural fat and bilateral facet arthrosis. There is moderate to severe   extrinsic narrowing of the thecal sac predominantly due to lipomatosis.   Mild bilateral foraminal narrowing, left greater than right.  L3-L4: Small bilateral foraminal protrusions, left greater than right and   right facet arthrosis and thickening of the ligamentum flavum and mild   prominence of the posterior epidural fat resulting in indentation of the   thecal sac and mild left foraminal narrowing.  L4-L5: Small disc bulge and prominence of the epidural fat facet   arthrosis and thickening of the ligamentum flavum resulting in severe   extrinsic narrowing of the thecal sac and mild left and moderate right   foraminal narrowing.  L5-S1: Prominent of the epidural fat resulting in severe extrinsic   narrowing of thethecal sac. Destructive right facet changes. Mild right   foraminal narrowing.      IMPRESSION:    Findings most consistent with septic arthritis and osteomyelitis of the   right L5/S1 facet articulation involving the right L5 pedicle and right   sacral ala.  Dorsal epidural enhancement spanning L5 to the imaged sacrum visualized   up to the level of S3 with small foci of phlegmon and suspicion of thin   epidural abscess at the level of S3, which appears separate and new from   sacral Tarlov cysts.  Lumbar spondylosis and epidural lipomatosis resulting in advanced   extrinsic narrowing of the thecal sac, most notably at L2/3, L4/5 and   L5/S1.      ASSESSMENT & PLAN  64yFemale w/ L5-S1 R facet septic arthritis, osteomyelitis and small epidural abscess spanning L5-S3. She has no neurodeficits.     - IR consult for potential biopsy (Dr. Schafer)  - WBAT   - Follow up blood cultures  - Infectious workup   - Abx per medicine/ID  - Ortho to follow

## 2024-12-08 NOTE — H&P ADULT - NSICDXPASTMEDICALHX_GEN_ALL_CORE_FT
PAST MEDICAL HISTORY:  History of COPD     Lung cancer     Osteoporosis     Other nonspecific abnormal finding of lung field

## 2024-12-08 NOTE — H&P ADULT - NSHPPHYSICALEXAM_GEN_ALL_CORE
Vital Signs Last 24 Hrs  T(C): 36 (12-08-24 @ 21:01), Max: 36.8 (12-08-24 @ 19:09)  T(F): 96.8 (12-08-24 @ 21:01), Max: 98.2 (12-08-24 @ 19:09)  HR: 114 (12-08-24 @ 21:01) (78 - 114)  BP: 138/93 (12-08-24 @ 21:01) (112/73 - 138/93)  BP(mean): --  RR: 17 (12-08-24 @ 21:01) (17 - 18)  SpO2: 99% (12-08-24 @ 21:01) (96% - 99%)

## 2024-12-08 NOTE — ED PROVIDER NOTE - PROGRESS NOTE DETAILS
Rubén Mak MD PGY-1: asymptomatic. reached ortho after several pages. Recommend admission to medicine with likely CT guided IR biopsy. Had ordered empiric abx, however upon admitting to Dr. Allen he reportedly spoke to Dr. Antonio who wishes to defer abx until biopsy is completed. Abx discontinued by Dr. Allen.

## 2024-12-08 NOTE — H&P ADULT - TIME BILLING
Need to interview and examine patient, review ortho recommendations, provide counseling, coordinate care, place orders, document, personally review imaging, ekg and review prior medical records

## 2024-12-08 NOTE — H&P ADULT - ASSESSMENT
64F w/ hx of Lung CA s/p resection, HTN, anxiety, p/w concern for osteomyelitis and epidural abscess after repeat imaging w/ Dr. Merino

## 2024-12-08 NOTE — H&P ADULT - PROBLEM SELECTOR PLAN 1
Recent MRI concerning for OM and epidural abscess. Note lack of leukocytosis of fever. Slight elevation in inflammatory markers. Appreciate orthopedic recommendations.   -IR consult ordered  -F/u BCxs  -Trend wbc and fever curve  -F/u ortho recommendations  -WBAT  -Consider ID consult as per Dr. Allen

## 2024-12-08 NOTE — H&P ADULT - HISTORY OF PRESENT ILLNESS
64F w/ hx of Lung CA s/p resection, HTN, anxiety, p/w concern for epidural infection after repeat imaging w/ Dr. Merino. Concern for infection began in October when MRI showed ambiguous findings of infection at L5-S1. Repeat imaging this week revealed progression of infection so she was sent in for infectious workup. She has had RLE radiculopathy for several years. She has not noticed her symptoms to have gotten any worse recently. She has been able to walk without difficulty.Denies focal weakness, numbness/tingling, or radicular sxs. Denies fevers/chills, acute changes in bowel/bladder function, or saddle anesthesia. Denies recent falls/trauma or any other ortho injuries. Community ambulator w/o assistive devices at baseline. She does report recent infection of UTI which was treated with PO abx. Patient otherwise denies fevers chills or back pain. Still has sciatica but is tolerable. Denies any procedures for past several months.    In ER: Given IV Zosyn, Vancomycin, LR 1L, ibuprofen 400mg x1 64F w/ hx of Lung CA s/p resection, HTN, anxiety, p/w concern for epidural infection after repeat imaging w/ Dr. Merino. Concern for infection began in October when MRI showed ambiguous findings of infection at L5-S1. Repeat imaging this week revealed progression of infection so she was sent in for infectious workup. She has had RLE radiculopathy for several years. She has not noticed her symptoms to have gotten any worse recently. She has been able to walk without difficulty.Denies focal weakness, numbness/tingling, or radicular sxs. Denies fevers/chills, acute changes in bowel/bladder function, or saddle anesthesia. Denies recent falls/trauma or any other ortho injuries. Community ambulator w/o assistive devices at baseline. She does report recent infection of UTI which was treated with PO abx. Patient otherwise denies fevers chills or back pain. Still has sciatica but is tolerable. Denies any procedures for past several months.    In ER: Made NPO

## 2024-12-08 NOTE — ED PROVIDER NOTE - PHYSICAL EXAMINATION
GENERAL: Awake, alert, NAD  HEAD: NC/AT, neck supple, moist mucous membranes  EYES: PERRL, EOM grossly intact, sclera anicteric  LUNGS: normal WOB on RA, CTAB, no wheezes or crackles   CARDIAC: RRR, no m/r/g  ABDOMEN: nondistended  BACK: No midline spinal tenderness  EXT: No edema, no deformities.  NEURO: A&Ox3. Moving all extremities. 5/5 strength with hip flexion, dorsi/plantar ankle flexion, SITL bilaterally, ambulatory without difficulty  SKIN: Warm and dry. No rash.  PSYCH: Normal affect.

## 2024-12-08 NOTE — H&P ADULT - NSHPADDITIONALINFOADULT_GEN_ALL_CORE
I was asked to see this patient by the [facility]. Dr. Allen to assume care for patient in AM and thereafter

## 2024-12-08 NOTE — H&P ADULT - PROBLEM SELECTOR PLAN 2
Hx of Lung Resection, pt states she is due for surveillance CT chest on Tuesday  -Consider ordering CT chest non-con if able to be followed after discharge  -Trelegy substitution

## 2024-12-08 NOTE — ED ADULT TRIAGE NOTE - CHIEF COMPLAINT QUOTE
Sent from MD for needle biopsy. States she was diagnosed with an Infection on the spine seen on recent MRI. c/o non-radiating lower back pain. Denies fever or numbness/tingling to extremities. Ambulatory in triage.

## 2024-12-08 NOTE — ED PROVIDER NOTE - CLINICAL SUMMARY MEDICAL DECISION MAKING FREE TEXT BOX
64-year-old female with a history COPD, lung cancer, osteoporosis, HTN presenting after she was sent in by Dr. Merino Ortho/spine for abnormal MRI findings consistent with possible septic arthritis, osteomyelitis, epidural abscess.  Patient reportedly developed right-sided sciatica 2 months ago and had initial MRI in October which showed some evidence of degenerative changes and some inflammation at L5/S1.  Sciatica has improved since that time, denies fevers, chills, numbness, weakness, urinary incontinence or saddle anesthesia.  She did have an epidural placed about 3 weeks ago.  Repeat MR completed on November 30 and read yesterday shows findings consistent with septic arthritis and osteomyelitis right L5/S1 facet as well as small foci suspicious for epidural abscess at the level of S3.  Reviewed by Dr. Merino yesterday and patient told to come to the ER for evaluation.  She denies any symptoms at this time.  No allergies.  Not on any blood thinning medications.    On exam well-appearing, nontoxic, afebrile and hemodynamically stable with no hypotension or tachycardia.  No midline spinal tenderness appreciated, 5/5 strength with hip flexion bilaterally, dorsi/plantarflexion, sensation intact to light touch bilaterally.  Will plan on obtaining coags, VBG, CBC, CMP, blood cultures, RVP, type and screen, UA as well as a chest x-ray and EKG.  Will reach out to orthopedics for further evaluation.  Will start antibiotics based on recommendations per surgery team. 64-year-old female with a history COPD, lung cancer s/p resection in remission x1year, osteoporosis, HTN presenting after she was sent in by Dr. Merino Ortho/spine for abnormal MRI findings consistent with possible septic arthritis, osteomyelitis, epidural abscess.  Patient reportedly developed right-sided sciatica 2 months ago and had initial MRI in October which showed some evidence of degenerative changes and some inflammation at L5/S1.  Sciatica has improved since that time, denies fevers, chills, numbness, weakness, urinary incontinence or saddle anesthesia.  She did have an epidural placed about 3 weeks ago.  Repeat MR completed on November 30 and read yesterday shows findings consistent with septic arthritis and osteomyelitis right L5/S1 facet as well as small foci suspicious for epidural abscess at the level of S3.  Reviewed by Dr. Merino yesterday and patient told to come to the ER for evaluation.  She denies any symptoms at this time.  No allergies.  Not on any blood thinning medications.    On exam well-appearing, nontoxic, afebrile and hemodynamically stable with no hypotension or tachycardia.  No midline spinal tenderness appreciated, 5/5 strength with hip flexion bilaterally, dorsi/plantarflexion, sensation intact to light touch bilaterally.  Will plan on obtaining coags, VBG, CBC, CMP, blood cultures, RVP, type and screen, UA as well as a chest x-ray and EKG.  Will reach out to orthopedics for further evaluation.  Will start antibiotics based on recommendations per surgery team.

## 2024-12-08 NOTE — ED ADULT NURSE NOTE - OBJECTIVE STATEMENT
pt alert ,oriented  x3. denies c/o pain. states sent to ed for  abnormal mri report with infection to spine as reported. denies fever. evaluated by md.  iv access to r ac labs sent. will continue to monitor.

## 2024-12-09 LAB
ALBUMIN SERPL ELPH-MCNC: 3.9 G/DL — SIGNIFICANT CHANGE UP (ref 3.3–5)
ALP SERPL-CCNC: 76 U/L — SIGNIFICANT CHANGE UP (ref 40–120)
ALT FLD-CCNC: 13 U/L — SIGNIFICANT CHANGE UP (ref 4–33)
ANION GAP SERPL CALC-SCNC: 14 MMOL/L — SIGNIFICANT CHANGE UP (ref 7–14)
AST SERPL-CCNC: 16 U/L — SIGNIFICANT CHANGE UP (ref 4–32)
BASOPHILS # BLD AUTO: 0.08 K/UL — SIGNIFICANT CHANGE UP (ref 0–0.2)
BASOPHILS NFR BLD AUTO: 1.2 % — SIGNIFICANT CHANGE UP (ref 0–2)
BILIRUB SERPL-MCNC: 0.4 MG/DL — SIGNIFICANT CHANGE UP (ref 0.2–1.2)
BUN SERPL-MCNC: 17 MG/DL — SIGNIFICANT CHANGE UP (ref 7–23)
CALCIUM SERPL-MCNC: 9.2 MG/DL — SIGNIFICANT CHANGE UP (ref 8.4–10.5)
CHLORIDE SERPL-SCNC: 103 MMOL/L — SIGNIFICANT CHANGE UP (ref 98–107)
CO2 SERPL-SCNC: 22 MMOL/L — SIGNIFICANT CHANGE UP (ref 22–31)
CREAT SERPL-MCNC: 0.83 MG/DL — SIGNIFICANT CHANGE UP (ref 0.5–1.3)
EGFR: 78 ML/MIN/1.73M2 — SIGNIFICANT CHANGE UP
EOSINOPHIL # BLD AUTO: 0.32 K/UL — SIGNIFICANT CHANGE UP (ref 0–0.5)
EOSINOPHIL NFR BLD AUTO: 5 % — SIGNIFICANT CHANGE UP (ref 0–6)
GLUCOSE SERPL-MCNC: 153 MG/DL — HIGH (ref 70–99)
HCT VFR BLD CALC: 36.6 % — SIGNIFICANT CHANGE UP (ref 34.5–45)
HGB BLD-MCNC: 12.3 G/DL — SIGNIFICANT CHANGE UP (ref 11.5–15.5)
IANC: 4.01 K/UL — SIGNIFICANT CHANGE UP (ref 1.8–7.4)
IMM GRANULOCYTES NFR BLD AUTO: 0.5 % — SIGNIFICANT CHANGE UP (ref 0–0.9)
LYMPHOCYTES # BLD AUTO: 1.54 K/UL — SIGNIFICANT CHANGE UP (ref 1–3.3)
LYMPHOCYTES # BLD AUTO: 24 % — SIGNIFICANT CHANGE UP (ref 13–44)
MCHC RBC-ENTMCNC: 32.2 PG — SIGNIFICANT CHANGE UP (ref 27–34)
MCHC RBC-ENTMCNC: 33.6 G/DL — SIGNIFICANT CHANGE UP (ref 32–36)
MCV RBC AUTO: 95.8 FL — SIGNIFICANT CHANGE UP (ref 80–100)
MONOCYTES # BLD AUTO: 0.45 K/UL — SIGNIFICANT CHANGE UP (ref 0–0.9)
MONOCYTES NFR BLD AUTO: 7 % — SIGNIFICANT CHANGE UP (ref 2–14)
NEUTROPHILS # BLD AUTO: 4.01 K/UL — SIGNIFICANT CHANGE UP (ref 1.8–7.4)
NEUTROPHILS NFR BLD AUTO: 62.3 % — SIGNIFICANT CHANGE UP (ref 43–77)
NRBC # BLD: 0 /100 WBCS — SIGNIFICANT CHANGE UP (ref 0–0)
NRBC # FLD: 0 K/UL — SIGNIFICANT CHANGE UP (ref 0–0)
PLATELET # BLD AUTO: 337 K/UL — SIGNIFICANT CHANGE UP (ref 150–400)
POTASSIUM SERPL-MCNC: 4.7 MMOL/L — SIGNIFICANT CHANGE UP (ref 3.5–5.3)
POTASSIUM SERPL-SCNC: 4.7 MMOL/L — SIGNIFICANT CHANGE UP (ref 3.5–5.3)
PROT SERPL-MCNC: 6.9 G/DL — SIGNIFICANT CHANGE UP (ref 6–8.3)
RBC # BLD: 3.82 M/UL — SIGNIFICANT CHANGE UP (ref 3.8–5.2)
RBC # FLD: 12.8 % — SIGNIFICANT CHANGE UP (ref 10.3–14.5)
SODIUM SERPL-SCNC: 139 MMOL/L — SIGNIFICANT CHANGE UP (ref 135–145)
WBC # BLD: 6.43 K/UL — SIGNIFICANT CHANGE UP (ref 3.8–10.5)
WBC # FLD AUTO: 6.43 K/UL — SIGNIFICANT CHANGE UP (ref 3.8–10.5)

## 2024-12-09 PROCEDURE — 71250 CT THORAX DX C-: CPT | Mod: 26

## 2024-12-09 PROCEDURE — 99223 1ST HOSP IP/OBS HIGH 75: CPT | Mod: GC

## 2024-12-09 RX ADMIN — Medication 5 MILLIGRAM(S): at 17:37

## 2024-12-09 RX ADMIN — FLUTICASONE PROPIONATE AND SALMETEROL XINAFOATE 1 DOSE(S): 45; 21 AEROSOL, METERED RESPIRATORY (INHALATION) at 21:39

## 2024-12-09 RX ADMIN — METOPROLOL TARTRATE 25 MILLIGRAM(S): 100 TABLET, FILM COATED ORAL at 06:54

## 2024-12-09 RX ADMIN — METOPROLOL TARTRATE 25 MILLIGRAM(S): 100 TABLET, FILM COATED ORAL at 17:37

## 2024-12-09 RX ADMIN — Medication 5 MILLIGRAM(S): at 06:53

## 2024-12-09 RX ADMIN — Medication 2 PUFF(S): at 11:42

## 2024-12-09 RX ADMIN — FLUTICASONE PROPIONATE AND SALMETEROL XINAFOATE 1 DOSE(S): 45; 21 AEROSOL, METERED RESPIRATORY (INHALATION) at 11:41

## 2024-12-09 NOTE — PROGRESS NOTE ADULT - SUBJECTIVE AND OBJECTIVE BOX
Patient is a 64y old  Female who presents with a chief complaint of epidural abscess concern (09 Dec 2024 11:06)  patient not known to me, assigned this AM to assume care  chart reviewed and events thus far noted  admitted overnight by hospitalist colleague     DATE OF SERVICE: 12-09-24 @ 13:47    SUBJECTIVE / OVERNIGHT EVENTS: overnight events noted    ROS:  Resp: No cough no sputum production  CVS: No chest pain no palpitations no orthopnea  GI: no N/V/D  : no dysuria, no hematuria  Neuro: no weakness no paresthesias  Heme: No petechiae no easy bruising  Msk: No joint pain no swelling  Skin: No rash no itching        MEDICATIONS  (STANDING):  busPIRone 5 milliGRAM(s) Oral two times a day  fluticasone propionate/ salmeterol 100-50 MICROgram(s) Diskus 1 Dose(s) Inhalation two times a day  influenza  Vaccine (HIGH DOSE) 0.5 milliLiter(s) IntraMuscular once  metoprolol tartrate 25 milliGRAM(s) Oral two times a day  tiotropium 2.5 MICROgram(s) Inhaler 2 Puff(s) Inhalation daily    MEDICATIONS  (PRN):  acetaminophen     Tablet .. 650 milliGRAM(s) Oral every 6 hours PRN Temp greater or equal to 38C (100.4F), Mild Pain (1 - 3)  melatonin 3 milliGRAM(s) Oral at bedtime PRN Insomnia        CAPILLARY BLOOD GLUCOSE        I&O's Summary    08 Dec 2024 07:01  -  09 Dec 2024 07:00  --------------------------------------------------------  IN: 550 mL / OUT: 700 mL / NET: -150 mL        Vital Signs Last 24 Hrs  T(C): 36.7 (09 Dec 2024 09:47), Max: 36.8 (08 Dec 2024 19:09)  T(F): 98 (09 Dec 2024 09:47), Max: 98.2 (08 Dec 2024 19:09)  HR: 100 (09 Dec 2024 09:47) (90 - 114)  BP: 112/65 (09 Dec 2024 09:47) (112/65 - 138/93)  BP(mean): --  RR: 17 (09 Dec 2024 09:47) (17 - 18)  SpO2: 96% (09 Dec 2024 09:47) (95% - 99%)    PHYSICAL EXAM:  GENERAL: in no apparent distress  HEAD:  Atraumatic, Normocephalic  EYES: EOMI, PERRLA, sclera clear  NECK: Supple, No JVD  CHEST/LUNG: clear b/l, no wheeze  HEART: S1 S2; no murmurs appreciated  ABDOMEN: Soft, Nontender, Bowel sounds present  EXTREMITIES:  No clubbing or cyanosis,  no edema  NEUROLOGY: AO x 3 non-focal  SKIN: No rashes or lesions    LABS:                        12.3   6.43  )-----------( 337      ( 09 Dec 2024 09:40 )             36.6     12-09    139  |  103  |  17  ----------------------------<  153[H]  4.7   |  22  |  0.83    Ca    9.2      09 Dec 2024 09:40    TPro  6.9  /  Alb  3.9  /  TBili  0.4  /  DBili  x   /  AST  16  /  ALT  13  /  AlkPhos  76  12-09    PT/INR - ( 08 Dec 2024 11:20 )   PT: 10.9 sec;   INR: 0.92 ratio         PTT - ( 08 Dec 2024 11:20 )  PTT:31.2 sec      Urinalysis Basic - ( 09 Dec 2024 09:40 )    Color: x / Appearance: x / SG: x / pH: x  Gluc: 153 mg/dL / Ketone: x  / Bili: x / Urobili: x   Blood: x / Protein: x / Nitrite: x   Leuk Esterase: x / RBC: x / WBC x   Sq Epi: x / Non Sq Epi: x / Bacteria: x          All consultant(s) notes reviewed and care discussed with other providers        Contact Number, Dr Allen [phone]

## 2024-12-09 NOTE — CONSULT NOTE ADULT - SUBJECTIVE AND OBJECTIVE BOX
Patient is a 64y old  Female who presents with a chief complaint of epidural abscess concern (09 Dec 2024 06:44)    HPI:  64F w/ hx of Lung CA s/p resection, HTN, anxiety, p/w concern for epidural infection after repeat imaging w/ Dr. Merino. Concern for infection began in October when MRI showed ambiguous findings of infection at L5-S1. Repeat imaging this week revealed progression of infection so she was sent in for infectious workup. She has had RLE radiculopathy for several years. She has not noticed her symptoms to have gotten any worse recently. She has been able to walk without difficulty.Denies focal weakness, numbness/tingling, or radicular sxs. Denies fevers/chills, acute changes in bowel/bladder function, or saddle anesthesia. Denies recent falls/trauma or any other ortho injuries. Community ambulator w/o assistive devices at baseline. She does report recent infection of UTI which was treated with PO abx. Patient otherwise denies fevers chills or back pain. Still has sciatica but is tolerable. Denies any procedures for past several months.    ER/hospital course: afebrile, no leucocytosis    Blood cx obtained    No prior significant micro data on chart review    ESR 46, CRP 9       prior hospital charts reviewed [ x ]  primary team notes reviewed [ x ]  other consultant notes reviewed [ x ]    PAST MEDICAL & SURGICAL HISTORY:  Osteoporosis      History of COPD      Other nonspecific abnormal finding of lung field      Lung cancer      H/O ovarian cystectomy      History of tonsillectomy          Allergies  No Known Allergies    ANTIMICROBIALS (past 90 days)  MEDICATIONS  (STANDING):          MEDICATIONS  (STANDING):  acetaminophen     Tablet .. 650 every 6 hours PRN  busPIRone 5 two times a day  fluticasone propionate/ salmeterol 100-50 MICROgram(s) Diskus 1 two times a day  influenza  Vaccine (HIGH DOSE) 0.5 once  melatonin 3 at bedtime PRN  metoprolol tartrate 25 two times a day  tiotropium 2.5 MICROgram(s) Inhaler 2 daily    SOCIAL HISTORY:       FAMILY HISTORY:    REVIEW OF SYSTEMS  [  ] ROS unobtainable because:    [  ] All other systems negative except as noted below:	    Constitutional:  [ ] fever [ ] chills  [ ] weight loss  [ ] weakness  Skin:  [ ] rash [ ] phlebitis	  Eyes: [ ] icterus [ ] pain  [ ] discharge	  ENMT: [ ] sore throat  [ ] thrush [ ] ulcers [ ] exudates  Respiratory: [ ] dyspnea [ ] hemoptysis [ ] cough [ ] sputum	  Cardiovascular:  [ ] chest pain [ ] palpitations [ ] edema	  Gastrointestinal:  [ ] nausea [ ] vomiting [ ] diarrhea [ ] constipation [ ] pain	  Genitourinary:  [ ] dysuria [ ] frequency [ ] hematuria [ ] discharge [ ] flank pain  [ ] incontinence  Musculoskeletal:  [ ] myalgias [ ] arthralgias [ ] arthritis  [ ] back pain  Neurological:  [ ] headache [ ] seizures  [ ] confusion/altered mental status  Psychiatric:  [ ] anxiety [ ] depression	  Hematology/Lymphatics:  [ ] lymphadenopathy  Endocrine:  [ ] adrenal [ ] thyroid  Allergic/Immunologic:	 [ ] transplant [ ] seasonal    Vital Signs Last 24 Hrs  T(F): 98 (12-09-24 @ 09:47), Max: 98.2 (12-08-24 @ 19:09)  Vital Signs Last 24 Hrs  HR: 100 (12-09-24 @ 09:47) (78 - 114)  BP: 112/65 (12-09-24 @ 09:47) (112/65 - 138/93)  RR: 17 (12-09-24 @ 09:47)  SpO2: 96% (12-09-24 @ 09:47) (95% - 99%)  Wt(kg): --    PHYSICAL EXAM:                              12.3   6.43  )-----------( 337      ( 09 Dec 2024 09:40 )             36.6   12-08    141  |  104  |  17  ----------------------------<  96  4.2   |  25  |  0.91    Ca    9.8      08 Dec 2024 11:20    TPro  7.5  /  Alb  4.3  /  TBili  0.5  /  DBili  x   /  AST  16  /  ALT  16  /  AlkPhos  87  12-08    Urinalysis Basic - ( 08 Dec 2024 11:20 )    Color: x / Appearance: x / SG: x / pH: x  Gluc: 96 mg/dL / Ketone: x  / Bili: x / Urobili: x   Blood: x / Protein: x / Nitrite: x   Leuk Esterase: x / RBC: x / WBC x   Sq Epi: x / Non Sq Epi: x / Bacteria: x    MICROBIOLOGY:              RADIOLOGY:  imaging below personally reviewed and agree with findings    < from: MR Lumbar Spine w/wo IV Cont (11.30.24 @ 14:17) >    EXAM: [id number] - MR SPINE LUMBAR WAW IC  - ORDERED BY: BAKARI MERINO      PROCEDURE DATE:  11/30/2024           INTERPRETATION:  CLINICAL INFORMATION: Follow-up inflammation on prior MRI    ADDITIONAL CLINICAL INFORMATION: Low back muscle strain S39.012A    TECHNIQUE: Multiplanar, multisequence MRI was performed of the lumbar   spine.  IV Contrast: Gadavist  8 cc administered   2 cc discarded    PRIOR STUDIES: Lumbar Spine MRI 10/12/2024    FINDINGS:    LOCALIZER: No additional findings.  BONES/ALIGNMENT: There is decreased T1 marrow signal with associated   edema and enhancement of the right L5/S1 facet extending into the right   L5 pedicle and right sacral ala. There is periarticular ill-defined edema   and enhancement present. There is interval development of erosive changes   of the right L5/S1 facet. Findings are most consistent with septic   arthritis/osteomyelitis of the right L5/S1 facet articulation. There is   diffuse dorsal epidural enhancement spanning L5 to the sacrum which is   imaged up to the level of S3. There is a focus of curvilinear   hyperintense STIR signal which displays post contrast enhancement within   the posterior epidural space at the level of L5/S1 favored to represent   measuring 1.8 x 0.4 x 0.4 cm (series 5, image 12) as well as additional   thin rim-enhancing fluid collection at the level of S3 measuring 2.1 x   0.4 x 0.3 cm, which is new from prior exam and appears separate from the   sacral Tarlov cysts, concerning for an epidural abscess (series5 and 10,   image 12 and series 11, image 61). There appears to be focal area of   inflamed epidural fat anteriorly on the right along the posterior   inferior aspect of the L5 vertebral body. Broad dextroconvexity of the   lumbar spine with asymmetric loss of intervertebral disc height on the   left at L2/L3 with mild degenerative endplate enhancement asymmetric to   the left at this level, unchanged from prior exam. Slight leftward   lateral listhesis of L1 on L2. There is trace retrolisthesis of L1 on L2   and L2 on L3.  SACROILIAC JOINTS/SACRUM: There is no sacral fracture. The SI joints are   partially visualized but are intact.  CONUS AND CAUDA EQUINA: The distal cord and conus are normal in signal.   Conus terminates at L1/L2. There isenhancement of the bilateral exiting   S1 nerves, right greater than left and of the exiting right L5 nerve,   most consistent with inflammation. There is dorsal epidural enhancement   spanning L5 to the imaged distal sacrum at S3. There is epidural   lipomatosis. Tarlov cysts.  VISUALIZED INTRAPELVIC/INTRA-ABDOMINAL SOFT TISSUES: Normal.  PARASPINAL SOFT TISSUES: Ill-defined edema within the right paraspinal   musculature spanning L4-S1.      INDIVIDUAL LEVELS:    LOWER THORACIC SPINE: No spinalcanal or neuroforaminal stenosis.    L1-L2: Disc bulge resulting in indentation of the thecal sac and mild   foraminal narrowing.  L2-L3: Small disc osteophyte complex with prominence of the posterior   epidural fat and bilateral facet arthrosis. There is moderate to severe   extrinsic narrowing of the thecal sac predominantly due to lipomatosis.   Mild bilateral foraminal narrowing, left greater than right.  L3-L4: Small bilateral foraminal protrusions, left greater than right and   right facet arthrosis and thickening of the ligamentum flavum and mild   prominence of the posterior epidural fat resulting in indentation of the   thecal sac and mild left foraminal narrowing.  L4-L5: Small disc bulge and prominence of the epidural fat facet   arthrosis and thickening of the ligamentum flavum resulting in severe   extrinsic narrowing of the thecal sac and mild left and moderate right   foraminal narrowing.  L5-S1: Prominent of the epidural fat resulting in severe extrinsic   narrowing of thethecal sac. Destructive right facet changes. Mild right   foraminal narrowing.      IMPRESSION:    Findings most consistent with septic arthritis and osteomyelitis of the   right L5/S1 facet articulation involving the right L5 pedicle and right   sacral ala.  Dorsal epidural enhancement spanning L5 to the imaged sacrum visualized   up to the level of S3 with small foci of phlegmon and suspicion of thin   epidural abscess at the level of S3, which appears separate and new from   sacral Tarlov cysts.  Lumbar spondylosis and epidural lipomatosis resulting in advanced   extrinsic narrowing of the thecal sac, most notably at L2/3, L4/5 and   L5/S1.    Findings discussed with Dr. Merino 12/7/2024 at 10:28 AM.      < end of copied text >   Patient is a 64y old  Female who presents with a chief complaint of epidural abscess concern (09 Dec 2024 06:44)    HPI:  64F w/ hx of Lung CA s/p resection, HTN, anxiety, p/w concern for epidural infection after repeat imaging w/ Dr. Merino. Concern for infection began in October when MRI showed ambiguous findings of infection at L5-S1. Repeat imaging this week revealed progression of infection so she was sent in for infectious workup. She has had RLE radiculopathy for several years. She has not noticed her symptoms to have gotten any worse recently. She has been able to walk without difficulty. Denies focal weakness, numbness/tingling, or radicular sxs. Denies fevers/chills, acute changes in bowel/bladder function, or saddle anesthesia. Denies recent falls/trauma or any other ortho injuries. Community ambulator w/o assistive devices at baseline. She does report recent infection of UTI which was treated with PO abx. Patient otherwise denies fevers chills or back pain. Still has sciatica but is tolerable. Denies any procedures for past several months.    ER/hospital course: afebrile, no leucocytosis    Blood cx obtained    No prior significant micro data on chart review    ESR 46, CRP 9       prior hospital charts reviewed [ x ]  primary team notes reviewed [ x ]  other consultant notes reviewed [ x ]    PAST MEDICAL & SURGICAL HISTORY:  Osteoporosis      History of COPD      Other nonspecific abnormal finding of lung field      Lung cancer      H/O ovarian cystectomy      History of tonsillectomy          Allergies  No Known Allergies    ANTIMICROBIALS (past 90 days)  MEDICATIONS  (STANDING):          MEDICATIONS  (STANDING):  acetaminophen     Tablet .. 650 every 6 hours PRN  busPIRone 5 two times a day  fluticasone propionate/ salmeterol 100-50 MICROgram(s) Diskus 1 two times a day  influenza  Vaccine (HIGH DOSE) 0.5 once  melatonin 3 at bedtime PRN  metoprolol tartrate 25 two times a day  tiotropium 2.5 MICROgram(s) Inhaler 2 daily    SOCIAL HISTORY: Lives alone, no tobacco, etoh use, no IVDU     FAMILY HISTORY: No history of malignancy. autoimmune ds in family    REVIEW OF SYSTEMS  [  ] ROS unobtainable because:    [  x] All other systems negative except as noted below:	    Constitutional:  [ ] fever [ ] chills  [ ] weight loss  [ ] weakness  Skin:  [ ] rash [ ] phlebitis	  Eyes: [ ] icterus [ ] pain  [ ] discharge	  ENMT: [ ] sore throat  [ ] thrush [ ] ulcers [ ] exudates  Respiratory: [ ] dyspnea [ ] hemoptysis [ ] cough [ ] sputum	  Cardiovascular:  [ ] chest pain [ ] palpitations [ ] edema	  Gastrointestinal:  [ ] nausea [ ] vomiting [ ] diarrhea [ ] constipation [ ] pain	  Genitourinary:  [ ] dysuria [ ] frequency [ ] hematuria [ ] discharge [ ] flank pain  [ ] incontinence  Musculoskeletal:  [ ] myalgias [ ] arthralgias [ ] arthritis  [ ] back pain  Neurological:  [ ] headache [ ] seizures  [ ] confusion/altered mental status  Psychiatric:  [ ] anxiety [ ] depression	  Hematology/Lymphatics:  [ ] lymphadenopathy  Endocrine:  [ ] adrenal [ ] thyroid  Allergic/Immunologic:	 [ ] transplant [ ] seasonal    Vital Signs Last 24 Hrs  T(F): 98 (12-09-24 @ 09:47), Max: 98.2 (12-08-24 @ 19:09)  Vital Signs Last 24 Hrs  HR: 100 (12-09-24 @ 09:47) (78 - 114)  BP: 112/65 (12-09-24 @ 09:47) (112/65 - 138/93)  RR: 17 (12-09-24 @ 09:47)  SpO2: 96% (12-09-24 @ 09:47) (95% - 99%)  Wt(kg): --    PHYSICAL EXAM:    General: Patient in NAD  HEENT: NCAT, EOMI, PERRL, no oral lesions  CV: S1+S2, no m/r/g appreciated   Lungs: No respiratory distress, CTAB  Abd: Soft, nontender, no guarding, no rebound tenderness, + bowel sounds   : No suprapubic tenderness  Neuro: Alert and oriented to time, place and person. Moves all extremities against gravity.  Ext: No cyanosis, no edema  Skin: No rash, no phlebitis                          12.3   6.43  )-----------( 337      ( 09 Dec 2024 09:40 )             36.6   12-08    141  |  104  |  17  ----------------------------<  96  4.2   |  25  |  0.91    Ca    9.8      08 Dec 2024 11:20    TPro  7.5  /  Alb  4.3  /  TBili  0.5  /  DBili  x   /  AST  16  /  ALT  16  /  AlkPhos  87  12-08    Urinalysis Basic - ( 08 Dec 2024 11:20 )    Color: x / Appearance: x / SG: x / pH: x  Gluc: 96 mg/dL / Ketone: x  / Bili: x / Urobili: x   Blood: x / Protein: x / Nitrite: x   Leuk Esterase: x / RBC: x / WBC x   Sq Epi: x / Non Sq Epi: x / Bacteria: x    MICROBIOLOGY:              RADIOLOGY:  imaging below personally reviewed and agree with findings    < from: MR Lumbar Spine w/wo IV Cont (11.30.24 @ 14:17) >    EXAM: [id number] - MR SPINE LUMBAR WAW IC  - ORDERED BY: BAKARI MERINO      PROCEDURE DATE:  11/30/2024           INTERPRETATION:  CLINICAL INFORMATION: Follow-up inflammation on prior MRI    ADDITIONAL CLINICAL INFORMATION: Low back muscle strain S39.012A    TECHNIQUE: Multiplanar, multisequence MRI was performed of the lumbar   spine.  IV Contrast: Gadavist  8 cc administered   2 cc discarded    PRIOR STUDIES: Lumbar Spine MRI 10/12/2024    FINDINGS:    LOCALIZER: No additional findings.  BONES/ALIGNMENT: There is decreased T1 marrow signal with associated   edema and enhancement of the right L5/S1 facet extending into the right   L5 pedicle and right sacral ala. There is periarticular ill-defined edema   and enhancement present. There is interval development of erosive changes   of the right L5/S1 facet. Findings are most consistent with septic   arthritis/osteomyelitis of the right L5/S1 facet articulation. There is   diffuse dorsal epidural enhancement spanning L5 to the sacrum which is   imaged up to the level of S3. There is a focus of curvilinear   hyperintense STIR signal which displays post contrast enhancement within   the posterior epidural space at the level of L5/S1 favored to represent   measuring 1.8 x 0.4 x 0.4 cm (series 5, image 12) as well as additional   thin rim-enhancing fluid collection at the level of S3 measuring 2.1 x   0.4 x 0.3 cm, which is new from prior exam and appears separate from the   sacral Tarlov cysts, concerning for an epidural abscess (series5 and 10,   image 12 and series 11, image 61). There appears to be focal area of   inflamed epidural fat anteriorly on the right along the posterior   inferior aspect of the L5 vertebral body. Broad dextroconvexity of the   lumbar spine with asymmetric loss of intervertebral disc height on the   left at L2/L3 with mild degenerative endplate enhancement asymmetric to   the left at this level, unchanged from prior exam. Slight leftward   lateral listhesis of L1 on L2. There is trace retrolisthesis of L1 on L2   and L2 on L3.  SACROILIAC JOINTS/SACRUM: There is no sacral fracture. The SI joints are   partially visualized but are intact.  CONUS AND CAUDA EQUINA: The distal cord and conus are normal in signal.   Conus terminates at L1/L2. There isenhancement of the bilateral exiting   S1 nerves, right greater than left and of the exiting right L5 nerve,   most consistent with inflammation. There is dorsal epidural enhancement   spanning L5 to the imaged distal sacrum at S3. There is epidural   lipomatosis. Tarlov cysts.  VISUALIZED INTRAPELVIC/INTRA-ABDOMINAL SOFT TISSUES: Normal.  PARASPINAL SOFT TISSUES: Ill-defined edema within the right paraspinal   musculature spanning L4-S1.      INDIVIDUAL LEVELS:    LOWER THORACIC SPINE: No spinalcanal or neuroforaminal stenosis.    L1-L2: Disc bulge resulting in indentation of the thecal sac and mild   foraminal narrowing.  L2-L3: Small disc osteophyte complex with prominence of the posterior   epidural fat and bilateral facet arthrosis. There is moderate to severe   extrinsic narrowing of the thecal sac predominantly due to lipomatosis.   Mild bilateral foraminal narrowing, left greater than right.  L3-L4: Small bilateral foraminal protrusions, left greater than right and   right facet arthrosis and thickening of the ligamentum flavum and mild   prominence of the posterior epidural fat resulting in indentation of the   thecal sac and mild left foraminal narrowing.  L4-L5: Small disc bulge and prominence of the epidural fat facet   arthrosis and thickening of the ligamentum flavum resulting in severe   extrinsic narrowing of the thecal sac and mild left and moderate right   foraminal narrowing.  L5-S1: Prominent of the epidural fat resulting in severe extrinsic   narrowing of thethecal sac. Destructive right facet changes. Mild right   foraminal narrowing.      IMPRESSION:    Findings most consistent with septic arthritis and osteomyelitis of the   right L5/S1 facet articulation involving the right L5 pedicle and right   sacral ala.  Dorsal epidural enhancement spanning L5 to the imaged sacrum visualized   up to the level of S3 with small foci of phlegmon and suspicion of thin   epidural abscess at the level of S3, which appears separate and new from   sacral Tarlov cysts.  Lumbar spondylosis and epidural lipomatosis resulting in advanced   extrinsic narrowing of the thecal sac, most notably at L2/3, L4/5 and   L5/S1.    Findings discussed with Dr. Merino 12/7/2024 at 10:28 AM.      < end of copied text >   Patient is a 64y old  Female who presents with a chief complaint of epidural abscess concern (09 Dec 2024 06:44)    HPI:  64F w/ hx of Lung CA s/p resection, HTN, anxiety, p/w concern for epidural infection after repeat imaging w/ Dr. Merino. Concern for infection began in October when MRI showed ambiguous findings of infection at L5-S1. Repeat imaging this week revealed progression of infection so she was sent in for infectious workup. She has had RLE radiculopathy for several years. She has not noticed her symptoms to have gotten any worse recently. She has been able to walk without difficulty. Denies focal weakness, numbness/tingling, or radicular sxs. Denies fevers/chills, acute changes in bowel/bladder function, or saddle anesthesia. Denies recent falls/trauma or any other ortho injuries. Community ambulator w/o assistive devices at baseline. She does report recent infection of UTI which was treated with PO abx. Patient otherwise denies fevers chills or back pain. Still has sciatica but is tolerable. Denies any procedures for past several months.    ER/hospital course: afebrile, no leucocytosis    Blood cx obtained    No prior significant micro data on chart review    ESR 46, CRP 9       prior hospital charts reviewed [ x ]  primary team notes reviewed [ x ]  other consultant notes reviewed [ x ]    PAST MEDICAL & SURGICAL HISTORY:  Osteoporosis      History of COPD      Other nonspecific abnormal finding of lung field      Lung cancer      H/O ovarian cystectomy      History of tonsillectomy          Allergies  No Known Allergies    ANTIMICROBIALS (past 90 days)  MEDICATIONS  (STANDING):          MEDICATIONS  (STANDING):  acetaminophen     Tablet .. 650 every 6 hours PRN  busPIRone 5 two times a day  fluticasone propionate/ salmeterol 100-50 MICROgram(s) Diskus 1 two times a day  influenza  Vaccine (HIGH DOSE) 0.5 once  melatonin 3 at bedtime PRN  metoprolol tartrate 25 two times a day  tiotropium 2.5 MICROgram(s) Inhaler 2 daily      SOCIAL HISTORY: Lives alone, no tobacco, etoh use, no IVDU         FAMILY HISTORY: No history of malignancy. autoimmune ds in family        REVIEW OF SYSTEMS  [  ] ROS unobtainable because:    [  x] All other systems negative except as noted below:	    Constitutional:  [ ] fever [ ] chills  [ ] weight loss  [ ] weakness  Skin:  [ ] rash [ ] phlebitis	  Eyes: [ ] icterus [ ] pain  [ ] discharge	  ENMT: [ ] sore throat  [ ] thrush [ ] ulcers [ ] exudates  Respiratory: [ ] dyspnea [ ] hemoptysis [ ] cough [ ] sputum	  Cardiovascular:  [ ] chest pain [ ] palpitations [ ] edema	  Gastrointestinal:  [ ] nausea [ ] vomiting [ ] diarrhea [ ] constipation [ ] pain	  Genitourinary:  [ ] dysuria [ ] frequency [ ] hematuria [ ] discharge [ ] flank pain  [ ] incontinence  Musculoskeletal:  [ ] myalgias [ ] arthralgias [ ] arthritis  [ ] back pain  Neurological:  [ ] headache [ ] seizures  [ ] confusion/altered mental status  Psychiatric:  [ ] anxiety [ ] depression	  Allergic/Immunologic:	 [ ] transplant [ ] seasonal        Vital Signs Last 24 Hrs  T(F): 98 (12-09-24 @ 09:47), Max: 98.2 (12-08-24 @ 19:09)  Vital Signs Last 24 Hrs  HR: 100 (12-09-24 @ 09:47) (78 - 114)  BP: 112/65 (12-09-24 @ 09:47) (112/65 - 138/93)  RR: 17 (12-09-24 @ 09:47)  SpO2: 96% (12-09-24 @ 09:47) (95% - 99%)  Wt(kg): --    PHYSICAL EXAM:    General: Patient in NAD  EYES: No discharge   ENT: no oral lesions  CV: S1+S2, no m/r/g appreciated   Lungs: No respiratory distress, CTAB  Abd: Soft, nontender  : No suprapubic tenderness  Neuro: Alert and oriented to time, place and person. Moves all extremities against gravity.  Ext: No edema  Skin: No rash                          12.3   6.43  )-----------( 337      ( 09 Dec 2024 09:40 )             36.6   12-08    141  |  104  |  17  ----------------------------<  96  4.2   |  25  |  0.91    Ca    9.8      08 Dec 2024 11:20    TPro  7.5  /  Alb  4.3  /  TBili  0.5  /  DBili  x   /  AST  16  /  ALT  16  /  AlkPhos  87  12-08    Urinalysis Basic - ( 08 Dec 2024 11:20 )    Color: x / Appearance: x / SG: x / pH: x  Gluc: 96 mg/dL / Ketone: x  / Bili: x / Urobili: x   Blood: x / Protein: x / Nitrite: x   Leuk Esterase: x / RBC: x / WBC x   Sq Epi: x / Non Sq Epi: x / Bacteria: x    MICROBIOLOGY:              RADIOLOGY:  imaging below personally reviewed and agree with findings    < from: MR Lumbar Spine w/wo IV Cont (11.30.24 @ 14:17) >    EXAM: [id number] - MR SPINE LUMBAR WAW IC  - ORDERED BY: BAKARI MERINO      PROCEDURE DATE:  11/30/2024           INTERPRETATION:  CLINICAL INFORMATION: Follow-up inflammation on prior MRI    ADDITIONAL CLINICAL INFORMATION: Low back muscle strain S39.012A    TECHNIQUE: Multiplanar, multisequence MRI was performed of the lumbar   spine.  IV Contrast: Gadavist  8 cc administered   2 cc discarded    PRIOR STUDIES: Lumbar Spine MRI 10/12/2024    FINDINGS:    LOCALIZER: No additional findings.  BONES/ALIGNMENT: There is decreased T1 marrow signal with associated   edema and enhancement of the right L5/S1 facet extending into the right   L5 pedicle and right sacral ala. There is periarticular ill-defined edema   and enhancement present. There is interval development of erosive changes   of the right L5/S1 facet. Findings are most consistent with septic   arthritis/osteomyelitis of the right L5/S1 facet articulation. There is   diffuse dorsal epidural enhancement spanning L5 to the sacrum which is   imaged up to the level of S3. There is a focus of curvilinear   hyperintense STIR signal which displays post contrast enhancement within   the posterior epidural space at the level of L5/S1 favored to represent   measuring 1.8 x 0.4 x 0.4 cm (series 5, image 12) as well as additional   thin rim-enhancing fluid collection at the level of S3 measuring 2.1 x   0.4 x 0.3 cm, which is new from prior exam and appears separate from the   sacral Tarlov cysts, concerning for an epidural abscess (series5 and 10,   image 12 and series 11, image 61). There appears to be focal area of   inflamed epidural fat anteriorly on the right along the posterior   inferior aspect of the L5 vertebral body. Broad dextroconvexity of the   lumbar spine with asymmetric loss of intervertebral disc height on the   left at L2/L3 with mild degenerative endplate enhancement asymmetric to   the left at this level, unchanged from prior exam. Slight leftward   lateral listhesis of L1 on L2. There is trace retrolisthesis of L1 on L2   and L2 on L3.  SACROILIAC JOINTS/SACRUM: There is no sacral fracture. The SI joints are   partially visualized but are intact.  CONUS AND CAUDA EQUINA: The distal cord and conus are normal in signal.   Conus terminates at L1/L2. There isenhancement of the bilateral exiting   S1 nerves, right greater than left and of the exiting right L5 nerve,   most consistent with inflammation. There is dorsal epidural enhancement   spanning L5 to the imaged distal sacrum at S3. There is epidural   lipomatosis. Tarlov cysts.  VISUALIZED INTRAPELVIC/INTRA-ABDOMINAL SOFT TISSUES: Normal.  PARASPINAL SOFT TISSUES: Ill-defined edema within the right paraspinal   musculature spanning L4-S1.      INDIVIDUAL LEVELS:    LOWER THORACIC SPINE: No spinalcanal or neuroforaminal stenosis.    L1-L2: Disc bulge resulting in indentation of the thecal sac and mild   foraminal narrowing.  L2-L3: Small disc osteophyte complex with prominence of the posterior   epidural fat and bilateral facet arthrosis. There is moderate to severe   extrinsic narrowing of the thecal sac predominantly due to lipomatosis.   Mild bilateral foraminal narrowing, left greater than right.  L3-L4: Small bilateral foraminal protrusions, left greater than right and   right facet arthrosis and thickening of the ligamentum flavum and mild   prominence of the posterior epidural fat resulting in indentation of the   thecal sac and mild left foraminal narrowing.  L4-L5: Small disc bulge and prominence of the epidural fat facet   arthrosis and thickening of the ligamentum flavum resulting in severe   extrinsic narrowing of the thecal sac and mild left and moderate right   foraminal narrowing.  L5-S1: Prominent of the epidural fat resulting in severe extrinsic   narrowing of thethecal sac. Destructive right facet changes. Mild right   foraminal narrowing.      IMPRESSION:    Findings most consistent with septic arthritis and osteomyelitis of the   right L5/S1 facet articulation involving the right L5 pedicle and right   sacral ala.  Dorsal epidural enhancement spanning L5 to the imaged sacrum visualized   up to the level of S3 with small foci of phlegmon and suspicion of thin   epidural abscess at the level of S3, which appears separate and new from   sacral Tarlov cysts.  Lumbar spondylosis and epidural lipomatosis resulting in advanced   extrinsic narrowing of the thecal sac, most notably at L2/3, L4/5 and   L5/S1.

## 2024-12-09 NOTE — CONSULT NOTE ADULT - ASSESSMENT
Interventional Radiology    Evaluate for Procedure:     HPI: 64y Female with suspected L5-S1 osteomyelitis consulted to IR for bone biopsy to guide antibiotic therapy.     Allergies: No Known Allergies    Medications (Abx/Cardiac/Anticoagulation/Blood Products)    metoprolol tartrate: 25 milliGRAM(s) Oral (12-09 @ 06:54)    Data:    T(C): 36.7  HR: 100  BP: 112/65  RR: 17  SpO2: 96%    -WBC 6.43 / HgB 12.3 / Hct 36.6 / Plt 337  -Na 139 / Cl 103 / BUN 17 / Glucose 153  -K 4.7 / CO2 22 / Cr 0.83  -ALT 13 / Alk Phos 76 / T.Bili 0.4  -INR 0.92 / PTT 31.2      Radiology: reviewed    Assessment/Plan:   64y Female with suspected L5-S1 osteomyelitis consulted to IR for bone biopsy to guide antibiotic therapy.  Pt had blood culture draw, pending results. MRI Lumbar from 11/30/24. Recommend waiting for final report on blood culture. If blood culture is negative, recommend obtaining FU MRI lumbar spine and CT lumbar spine.   CDW Dr Krause.     -- FU pending blood culture results  -- Repeat MRI lumbar spine and CT lumbar spine if blood cultures are negative.     Greg Grayson, PGY-2 Resident  Vascular and Interventional Radiology   Available on [employer] Teams    - Non-emergent consults: Place IR consult order in [place]  - Emergent issues (pager): [facility] [phone]; [facility] [phone]; [phone]  - Scheduling questions: [facility] [phone]; [facility] [phone]  - Clinic/outpatient booking: [facility] [phone]; [facility] [phone] Interventional Radiology    Evaluate for Procedure:     HPI: 64y Female with suspected L5-S1 osteomyelitis consulted to IR for bone biopsy to guide antibiotic therapy.     Allergies: No Known Allergies    Medications (Abx/Cardiac/Anticoagulation/Blood Products)    metoprolol tartrate: 25 milliGRAM(s) Oral (12-09 @ 06:54)    Data:    T(C): 36.7  HR: 100  BP: 112/65  RR: 17  SpO2: 96%    -WBC 6.43 / HgB 12.3 / Hct 36.6 / Plt 337  -Na 139 / Cl 103 / BUN 17 / Glucose 153  -K 4.7 / CO2 22 / Cr 0.83  -ALT 13 / Alk Phos 76 / T.Bili 0.4  -INR 0.92 / PTT 31.2      Radiology: reviewed    Assessment/Plan:   64y Female with suspected L5-S1 osteomyelitis consulted to IR for bone biopsy to guide antibiotic therapy.  Pt had blood culture draw, pending results. MRI Lumbar from 11/30/24. Recommend waiting for final report on blood culture. If blood culture is negative, recommend obtaining FU MRI lumbar spine and CT lumbar spine to assess for interval progrression and for pre-procedural planning. CDW Dr Krause.     -- FU pending blood culture results  -- Repeat MRI lumbar spine and CT lumbar spine if blood cultures are negative.     Greg Grayson, PGY-2 Resident  Vascular and Interventional Radiology   Available on [employer] Teams    - Non-emergent consults: Place IR consult order in [place]  - Emergent issues (pager): [facility] [phone]; [facility] [phone]; [phone]  - Scheduling questions: [facility] [phone]; [facility] [phone]  - Clinic/outpatient booking: [facility] [phone]; [facility] [phone]

## 2024-12-09 NOTE — PROGRESS NOTE ADULT - SUBJECTIVE AND OBJECTIVE BOX
Orthopedic Surgery Progress Note     S: Patient seen and examined today. No acute events overnight. Does not endorse pain the morning. No numbness or paresthesia  Denies f/c, chest pain, shortness of breath, dizziness.      Physical Exam:  Gen: NAD  Resp: Symmetric chest rise, No increased WOB    Spine:    Motor exam:          Upper extremity         C5 (Shoulder Abd)    C6 (Elbow flex)   C7 (Elbow ext)   C8 (Finger flex) T1 (finger abd)         R         5/5                 5/5                       5/5                      5/5                         5/5          L          5/5                 5/5                      5/5                      5/5                         5/5                   Lower extremity           L2 (Hip flex)  L3 (knee ext)  L4 (Dorsi flex)  L5 (EHL)  S1 (Plantar flex)                                               R        5/5            5/5             5/5                    5/5          5/5      L        5/5            5/5             5/5                   5/5           5/5      Sensory exam:                        C5      C6      C7      C8       T1          RIGHT          2         2        2         2         2          (0=absent, 1=impaired, 2=normal, NT=not testable)  LEFT             2         2        2         2         2          (0=absent, 1=impaired, 2=normal, NT=not testable)                          L2      L3     L4     L5       S1          RIGHT          2         2        2         2         2          (0=absent, 1=impaired, 2=normal, NT=not testable)  LEFT             2         2        2         2         2          (0=absent, 1=impaired, 2=normal, NT=not testable)                                                 BLE: WWP, compartments soft and compressible      Vital Signs Last 24 Hrs  T(C): 36.6 (09 Dec 2024 02:11), Max: 36.8 (08 Dec 2024 19:09)  T(F): 97.9 (09 Dec 2024 02:11), Max: 98.2 (08 Dec 2024 19:09)  HR: 107 (09 Dec 2024 02:11) (78 - 114)  BP: 134/79 (09 Dec 2024 02:11) (112/73 - 138/93)  BP(mean): --  RR: 18 (09 Dec 2024 02:11) (17 - 18)  SpO2: 95% (09 Dec 2024 02:11) (95% - 99%)    Parameters below as of 09 Dec 2024 02:11  Patient On (Oxygen Delivery Method): room air        12-08-24 @ 07:01  -  12-09-24 @ 06:44  --------------------------------------------------------  IN: 300 mL / OUT: 400 mL / NET: -100 mL        LABS:                        12.6   7.96  )-----------( 394      ( 08 Dec 2024 11:20 )             38.0     12-08    141  |  104  |  17  ----------------------------<  96  4.2   |  25  |  0.91    Ca    9.8      08 Dec 2024 11:20    TPro  7.5  /  Alb  4.3  /  TBili  0.5  /  DBili  x   /  AST  16  /  ALT  16  /  AlkPhos  87  12-08      PAST MEDICAL & SURGICAL Hx  PAST MEDICAL & SURGICAL HISTORY:  Osteoporosis      History of COPD      Other nonspecific abnormal finding of lung field      Lung cancer      H/O ovarian cystectomy      History of tonsillectomy          MEDICATIONS  (STANDING):  busPIRone 5 milliGRAM(s) Oral two times a day  fluticasone propionate/ salmeterol 100-50 MICROgram(s) Diskus 1 Dose(s) Inhalation two times a day  influenza  Vaccine (HIGH DOSE) 0.5 milliLiter(s) IntraMuscular once  metoprolol tartrate 25 milliGRAM(s) Oral two times a day  tiotropium 2.5 MICROgram(s) Inhaler 2 Puff(s) Inhalation daily    MEDICATIONS  (PRN):  acetaminophen     Tablet .. 650 milliGRAM(s) Oral every 6 hours PRN Temp greater or equal to 38C (100.4F), Mild Pain (1 - 3)  melatonin 3 milliGRAM(s) Oral at bedtime PRN Insomnia        A: 64yFemale w/ L5-S1 R facet septic arthritis, osteomyelitis and small epidural abscess spanning L5-S3. She has no neurodeficits.     Plan:   - IR consult for potential biopsy (Dr. Schafer)  - WBAT   - Follow up 12/8 blood cultures  - Infectious workup   - Abx per medicine/ID (recieved CTX and Vanc on 12/8)   - Ortho to follow       It was a privilege to care for this patient. For all questions related to patient care, please reach out via the on-call pager.*     Korntey Dupont, PGY1  Orthopedic Surgery  [facility]: [id number]  [facility]: [id number]  [facility]: [id number]

## 2024-12-09 NOTE — CONSULT NOTE ADULT - ASSESSMENT
64F w/ hx of Lung CA s/p resection, HTN, anxiety, p/w concern for epidural infection after repeat imaging w/ Dr. Merino. Concern for infection began in October when MRI showed ambiguous findings of infection at L5-S1. Repeat imaging this week revealed progression of infection so she was sent in for infectious workup. She has had RLE radiculopathy for several years. She has not noticed her symptoms to have gotten any worse recently. She has been able to walk without difficulty.Denies focal weakness, numbness/tingling, or radicular sxs. Denies fevers/chills, acute changes in bowel/bladder function, or saddle anesthesia. Denies recent falls/trauma or any other ortho injuries. Community ambulator w/o assistive devices at baseline. She does report recent infection of UTI which was treated with PO abx. Patient otherwise denies fevers chills or back pain. Still has sciatica but is tolerable. Denies any procedures for past several months.    ER/hospital course: afebrile, no leucocytosis    Blood cx obtained    No prior significant micro data on chart review    ESR 46, CRP 9      # Concern for Septic arthritis/ OM of l5/S1  # Concern for Epidural abscess      - afebrile, no leucocytosis, no systemic signs, no FND  - Will need Aspiration/biopsy; please send routine, fungal AFB cultures  - OK to hold off antibiotics to increase cultures yield, unless new concerns arise      All recommendations are tentative pending Attending Attestation.    Michael Whitt MD, PGY-5  ID Fellow  [employer] Teams Preferred  After 5pm/weekends call [phone]   64F w/ hx of Lung CA s/p resection, HTN, anxiety, p/w concern for epidural infection after repeat imaging w/ Dr. Merino. Concern for infection began in October when MRI showed ambiguous findings of infection at L5-S1. Repeat imaging this week revealed progression of infection so she was sent in for infectious workup. She has had RLE radiculopathy for several years. She has not noticed her symptoms to have gotten any worse recently. She has been able to walk without difficulty. Denies focal weakness, numbness/tingling, or radicular sxs. Denies fevers/chills, acute changes in bowel/bladder function, or saddle anesthesia. Denies recent falls/trauma or any other ortho injuries. Community ambulator w/o assistive devices at baseline. She does report recent infection of UTI which was treated with PO abx. Patient otherwise denies fevers chills or back pain. Still has sciatica but is tolerable. Denies any procedures for past several months.    ER/hospital course: afebrile, no leucocytosis    Blood cx obtained    No prior significant micro data on chart review    ESR 46, CRP 9      # Concern for Septic arthritis/ OM of l5/S1  # Concern for Epidural abscess      - afebrile, no leucocytosis, no systemic signs, no FND  - Had L5/P6xfhyelch enhancement in prior MRI in october  - Will need Aspiration/biopsy; please send routine, fungal AFB cultures  - OK to hold off antibiotics to increase cultures yield, unless new concerns arise      All recommendations are tentative pending Attending Attestation.    Michael Whitt MD, PGY-5  ID Fellow  [employer] Teams Preferred  After 5pm/weekends call [phone]   64F w/ hx of Lung CA s/p resection, HTN, anxiety, p/w concern for epidural infection after repeat imaging w/ Dr. Merino. Concern for infection began in October when MRI showed ambiguous findings of infection at L5-S1. Repeat imaging this week revealed progression of infection so she was sent in for infectious workup. She has had RLE radiculopathy for several years. She has not noticed her symptoms to have gotten any worse recently. She has been able to walk without difficulty. Denies focal weakness, numbness/tingling, or radicular sxs. Denies fevers/chills, acute changes in bowel/bladder function, or saddle anesthesia. Denies recent falls/trauma or any other ortho injuries. Community ambulator w/o assistive devices at baseline. She does report recent infection of UTI which was treated with PO abx. Patient otherwise denies fevers chills or back pain. Still has sciatica but is tolerable. Denies any procedures for past several months.    ER/hospital course: afebrile, no leucocytosis    Blood cx obtained    No prior significant micro data on chart review    ESR 46, CRP 9      # Concern for Septic arthritis/ OM/ Epidural abscess  # Elevated inflammatory markers      - afebrile, no leucocytosis, no systemic signs, no FND  - Had L5/Q6fitbqpto enhancement in prior MRI in october  - Will need Aspiration/biopsy; please send routine, fungal AFB cultures  - OK to hold off antibiotics to increase cultures yield  - OK to continue Vancomycin and Ceftriaxone 2 gms Q12 post IR procedure      Case d/w Attending and Primary team.     Michael Whitt MD, PGY-5  ID Fellow  [employer] Teams Preferred  After 5pm/weekends call [phone]   64F w/ hx of Lung CA s/p resection, HTN, anxiety, p/w concern for epidural infection after repeat imaging w/ Dr. Merino. Concern for infection began in October when MRI showed ambiguous findings of infection at L5-S1. Repeat imaging this week revealed progression of infection so she was sent in for infectious workup. She has had RLE radiculopathy for several years. She has not noticed her symptoms to have gotten any worse recently. She has been able to walk without difficulty. Denies focal weakness, numbness/tingling, or radicular sxs. Denies fevers/chills, acute changes in bowel/bladder function, or saddle anesthesia. Denies recent falls/trauma or any other ortho injuries. Community ambulator w/o assistive devices at baseline. She does report recent infection of UTI which was treated with PO abx. Patient otherwise denies fevers chills or back pain. Still has sciatica but is tolerable. Denies any procedures for past several months.    ER/hospital course: afebrile, no leucocytosis    Blood cx obtained    No prior significant micro data on chart review    ESR 46, CRP 9      # Concern for Septic arthritis/ OM/ Epidural abscess  # Elevated inflammatory markers      - afebrile, no leucocytosis, no systemic signs, no FND  - Had L5/M3bxbvrezl enhancement in prior MRI in october  - Will need Aspiration/biopsy; please send routine, fungal AFB cultures  - OK to hold off antibiotics to increase cultures yield  - OK to resume Vancomycin and Ceftriaxone 2 gms Q12 post IR procedure      Case d/w Attending and Primary team.     Michael Whitt MD, PGY-5  ID Fellow  [employer] Teams Preferred  After 5pm/weekends call [phone]

## 2024-12-09 NOTE — CONSULT NOTE ADULT - ATTENDING COMMENTS
64F w/ hx of Lung CA s/p resection, HTN, anxiety, p/w concern for epidural infection after repeat imaging w/ Dr. Merino. Concern for infection began in October when MRI showed ambiguous findings of infection at L5-S1. Repeat imaging this week revealed progression of infection so she was sent in for infectious workup. She has had RLE radiculopathy for several years. She has not noticed her symptoms to have gotten any worse recently. She has been able to walk without difficulty. Denies focal weakness, numbness/tingling, or radicular sxs. Denies fevers/chills, acute changes in bowel/bladder function, or saddle anesthesia. Denies recent falls/trauma or any other ortho injuries. Community ambulator w/o assistive devices at baseline. She does report recent infection of UTI which was treated with PO abx. Patient otherwise denies fevers chills or back pain. Still has sciatica but is tolerable. Denies any procedures for past several months.    ER/hospital course: afebrile, no leucocytosis    Blood cx obtained    No prior significant micro data on chart review    ESR 46, CRP 9      # Concern for Septic arthritis/ OM/ Epidural abscess  # Elevated inflammatory markers  # Abnormal MRI      - afebrile, no leucocytosis, no systemic signs, no FND  - Had L5/F2uebddpof enhancement in prior MRI in october  - Will need Aspiration/biopsy; please send routine, fungal AFB cultures  - OK to hold off antibiotics to increase cultures yield  - OK to resume Vancomycin and Ceftriaxone 2 gms Q12 post IR procedure  - f/u blood cx      Jesus Martinez-Alanna  Please contact through [employer] Teams   If no response or past 5 pm/weekend call [phone].

## 2024-12-10 LAB
ALBUMIN SERPL ELPH-MCNC: 3.9 G/DL — SIGNIFICANT CHANGE UP (ref 3.3–5)
ALP SERPL-CCNC: 75 U/L — SIGNIFICANT CHANGE UP (ref 40–120)
ALT FLD-CCNC: 13 U/L — SIGNIFICANT CHANGE UP (ref 4–33)
ANION GAP SERPL CALC-SCNC: 11 MMOL/L — SIGNIFICANT CHANGE UP (ref 7–14)
AST SERPL-CCNC: 13 U/L — SIGNIFICANT CHANGE UP (ref 4–32)
BASOPHILS # BLD AUTO: 0.08 K/UL — SIGNIFICANT CHANGE UP (ref 0–0.2)
BASOPHILS NFR BLD AUTO: 1.3 % — SIGNIFICANT CHANGE UP (ref 0–2)
BILIRUB SERPL-MCNC: 0.3 MG/DL — SIGNIFICANT CHANGE UP (ref 0.2–1.2)
BUN SERPL-MCNC: 17 MG/DL — SIGNIFICANT CHANGE UP (ref 7–23)
CALCIUM SERPL-MCNC: 9.6 MG/DL — SIGNIFICANT CHANGE UP (ref 8.4–10.5)
CHLORIDE SERPL-SCNC: 104 MMOL/L — SIGNIFICANT CHANGE UP (ref 98–107)
CO2 SERPL-SCNC: 24 MMOL/L — SIGNIFICANT CHANGE UP (ref 22–31)
CREAT SERPL-MCNC: 0.85 MG/DL — SIGNIFICANT CHANGE UP (ref 0.5–1.3)
EGFR: 76 ML/MIN/1.73M2 — SIGNIFICANT CHANGE UP
EOSINOPHIL # BLD AUTO: 0.37 K/UL — SIGNIFICANT CHANGE UP (ref 0–0.5)
EOSINOPHIL NFR BLD AUTO: 6 % — SIGNIFICANT CHANGE UP (ref 0–6)
GLUCOSE SERPL-MCNC: 96 MG/DL — SIGNIFICANT CHANGE UP (ref 70–99)
HCT VFR BLD CALC: 35.6 % — SIGNIFICANT CHANGE UP (ref 34.5–45)
HGB BLD-MCNC: 12 G/DL — SIGNIFICANT CHANGE UP (ref 11.5–15.5)
IANC: 3.29 K/UL — SIGNIFICANT CHANGE UP (ref 1.8–7.4)
IMM GRANULOCYTES NFR BLD AUTO: 0.3 % — SIGNIFICANT CHANGE UP (ref 0–0.9)
LYMPHOCYTES # BLD AUTO: 1.68 K/UL — SIGNIFICANT CHANGE UP (ref 1–3.3)
LYMPHOCYTES # BLD AUTO: 27.3 % — SIGNIFICANT CHANGE UP (ref 13–44)
MCHC RBC-ENTMCNC: 31.8 PG — SIGNIFICANT CHANGE UP (ref 27–34)
MCHC RBC-ENTMCNC: 33.7 G/DL — SIGNIFICANT CHANGE UP (ref 32–36)
MCV RBC AUTO: 94.4 FL — SIGNIFICANT CHANGE UP (ref 80–100)
MONOCYTES # BLD AUTO: 0.72 K/UL — SIGNIFICANT CHANGE UP (ref 0–0.9)
MONOCYTES NFR BLD AUTO: 11.7 % — SIGNIFICANT CHANGE UP (ref 2–14)
NEUTROPHILS # BLD AUTO: 3.29 K/UL — SIGNIFICANT CHANGE UP (ref 1.8–7.4)
NEUTROPHILS NFR BLD AUTO: 53.4 % — SIGNIFICANT CHANGE UP (ref 43–77)
NRBC # BLD: 0 /100 WBCS — SIGNIFICANT CHANGE UP (ref 0–0)
NRBC # FLD: 0 K/UL — SIGNIFICANT CHANGE UP (ref 0–0)
PLATELET # BLD AUTO: 359 K/UL — SIGNIFICANT CHANGE UP (ref 150–400)
POTASSIUM SERPL-MCNC: 3.8 MMOL/L — SIGNIFICANT CHANGE UP (ref 3.5–5.3)
POTASSIUM SERPL-SCNC: 3.8 MMOL/L — SIGNIFICANT CHANGE UP (ref 3.5–5.3)
PROT SERPL-MCNC: 6.7 G/DL — SIGNIFICANT CHANGE UP (ref 6–8.3)
RBC # BLD: 3.77 M/UL — LOW (ref 3.8–5.2)
RBC # FLD: 12.6 % — SIGNIFICANT CHANGE UP (ref 10.3–14.5)
SODIUM SERPL-SCNC: 139 MMOL/L — SIGNIFICANT CHANGE UP (ref 135–145)
WBC # BLD: 6.16 K/UL — SIGNIFICANT CHANGE UP (ref 3.8–10.5)
WBC # FLD AUTO: 6.16 K/UL — SIGNIFICANT CHANGE UP (ref 3.8–10.5)

## 2024-12-10 PROCEDURE — 72158 MRI LUMBAR SPINE W/O & W/DYE: CPT | Mod: 26

## 2024-12-10 PROCEDURE — 99232 SBSQ HOSP IP/OBS MODERATE 35: CPT

## 2024-12-10 PROCEDURE — 72133 CT LUMBAR SPINE W/O & W/DYE: CPT | Mod: 26

## 2024-12-10 RX ADMIN — Medication 5 MILLIGRAM(S): at 05:08

## 2024-12-10 RX ADMIN — METOPROLOL TARTRATE 25 MILLIGRAM(S): 100 TABLET, FILM COATED ORAL at 05:08

## 2024-12-10 RX ADMIN — FLUTICASONE PROPIONATE AND SALMETEROL XINAFOATE 1 DOSE(S): 45; 21 AEROSOL, METERED RESPIRATORY (INHALATION) at 09:52

## 2024-12-10 RX ADMIN — METOPROLOL TARTRATE 25 MILLIGRAM(S): 100 TABLET, FILM COATED ORAL at 17:12

## 2024-12-10 RX ADMIN — ACETAMINOPHEN 500MG 650 MILLIGRAM(S): 500 TABLET, COATED ORAL at 19:21

## 2024-12-10 RX ADMIN — ACETAMINOPHEN 500MG 650 MILLIGRAM(S): 500 TABLET, COATED ORAL at 18:21

## 2024-12-10 RX ADMIN — Medication 2 PUFF(S): at 09:52

## 2024-12-10 RX ADMIN — Medication 5 MILLIGRAM(S): at 17:12

## 2024-12-10 RX ADMIN — FLUTICASONE PROPIONATE AND SALMETEROL XINAFOATE 1 DOSE(S): 45; 21 AEROSOL, METERED RESPIRATORY (INHALATION) at 20:41

## 2024-12-10 NOTE — PROGRESS NOTE ADULT - SUBJECTIVE AND OBJECTIVE BOX
Orthopedic Surgery Progress Note     S: Patient seen and examined today. No acute events overnight. Pain is well controlled. Denies f/c, chest pain, shortness of breath, dizziness.      Physical Exam:  Gen: NAD  Resp: Symmetric chest rise, No increased WOB      Spine:    Motor exam:          Upper extremity         C5 (Shoulder Abd)    C6 (Elbow flex)   C7 (Elbow ext)   C8 (Finger flex) T1 (finger abd)         R         5/5                 5/5                       5/5                      5/5                         5/5          L          5/5                 5/5                      5/5                      5/5                         5/5                   Lower extremity           L2 (Hip flex)  L3 (knee ext)  L4 (Dorsi flex)  L5 (EHL)  S1 (Plantar flex)                                               R        5/5            5/5             5/5                    5/5          5/5      L        5/5            5/5             5/5                   5/5           5/5      Sensory exam:                        C5      C6      C7      C8       T1          RIGHT          2         2        2         2         2          (0=absent, 1=impaired, 2=normal, NT=not testable)  LEFT             2         2        2         2         2          (0=absent, 1=impaired, 2=normal, NT=not testable)                          L2      L3     L4     L5       S1          RIGHT          2         2        2         2         2          (0=absent, 1=impaired, 2=normal, NT=not testable)  LEFT             2         2        2         2         2          (0=absent, 1=impaired, 2=normal, NT=not testable)                                                 BLE: WWP, compartments soft and compressible      Vital Signs Last 24 Hrs  T(C): 36.8 (10 Dec 2024 05:05), Max: 36.8 (09 Dec 2024 14:00)  T(F): 98.2 (10 Dec 2024 05:05), Max: 98.3 (09 Dec 2024 14:00)  HR: 100 (10 Dec 2024 05:05) (100 - 107)  BP: 126/82 (10 Dec 2024 05:05) (112/65 - 135/71)  BP(mean): --  RR: 17 (10 Dec 2024 05:05) (16 - 18)  SpO2: 98% (10 Dec 2024 05:05) (95% - 100%)    Parameters below as of 10 Dec 2024 05:05  Patient On (Oxygen Delivery Method): nasal cannula  O2 Flow (L/min): 2      12-08-24 @ 07:01  -  12-09-24 @ 07:00  --------------------------------------------------------  IN: 550 mL / OUT: 700 mL / NET: -150 mL    12-09-24 @ 07:01  -  12-10-24 @ 06:04  --------------------------------------------------------  IN: 1520 mL / OUT: 0 mL / NET: 1520 mL        LABS:                        12.3   6.43  )-----------( 337      ( 09 Dec 2024 09:40 )             36.6     12-09    139  |  103  |  17  ----------------------------<  153[H]  4.7   |  22  |  0.83    Ca    9.2      09 Dec 2024 09:40    TPro  6.9  /  Alb  3.9  /  TBili  0.4  /  DBili  x   /  AST  16  /  ALT  13  /  AlkPhos  76  12-09      PAST MEDICAL & SURGICAL Hx  PAST MEDICAL & SURGICAL HISTORY:  Osteoporosis      History of COPD      Other nonspecific abnormal finding of lung field      Lung cancer      H/O ovarian cystectomy      History of tonsillectomy          MEDICATIONS  (STANDING):  busPIRone 5 milliGRAM(s) Oral two times a day  fluticasone propionate/ salmeterol 100-50 MICROgram(s) Diskus 1 Dose(s) Inhalation two times a day  influenza  Vaccine (HIGH DOSE) 0.5 milliLiter(s) IntraMuscular once  metoprolol tartrate 25 milliGRAM(s) Oral two times a day  tiotropium 2.5 MICROgram(s) Inhaler 2 Puff(s) Inhalation daily    MEDICATIONS  (PRN):  acetaminophen     Tablet .. 650 milliGRAM(s) Oral every 6 hours PRN Temp greater or equal to 38C (100.4F), Mild Pain (1 - 3)  melatonin 3 milliGRAM(s) Oral at bedtime PRN Insomnia        A: 64yFemale w/ L5-S1 R facet septic arthritis, osteomyelitis and small epidural abscess spanning L5-S3. She has no neurodeficits.     Plan:   - FU IR consult for potential biopsy (Dr. Schafer)     - Per ID If blood culture is negative, recommend obtaining FU MRI lumbar spine and CT lumbar spine to assess for interval progrression and for pre-procedural planning.  - WBAT   - Follow up 12/8 blood cultures  - Infectious workup   - Appreciate Abx per medicine/ID (recieved CTX and Vanc on 12/8)       - OK to hold off antibiotics to increase cultures yield      - OK to resume Vancomycin and Ceftriaxone 2 gms Q12 post IR procedure    - Ortho to follow     For all questions related to patient care, please reach out via the on-call pager.*     Kortney Dupont, PGY1  Orthopedic Surgery  [facility]: [phone]  [facility]: [id number]  [facility] [place]: [phone] Orthopedic Surgery Progress Note     S: Patient seen and examined today. No acute events overnight. Pain is well controlled. Denies f/c, chest pain, shortness of breath, dizziness.      Physical Exam:  Gen: NAD  Resp: Symmetric chest rise, No increased WOB      Spine:    Motor exam:          Upper extremity         C5 (Shoulder Abd)    C6 (Elbow flex)   C7 (Elbow ext)   C8 (Finger flex) T1 (finger abd)         R         5/5                 5/5                       5/5                      5/5                         5/5          L          5/5                 5/5                      5/5                      5/5                         5/5                   Lower extremity           L2 (Hip flex)  L3 (knee ext)  L4 (Dorsi flex)  L5 (EHL)  S1 (Plantar flex)                                               R        5/5            5/5             5/5                    5/5          5/5      L        5/5            5/5             5/5                   5/5           5/5      Sensory exam:                        C5      C6      C7      C8       T1          RIGHT          2         2        2         2         2          (0=absent, 1=impaired, 2=normal, NT=not testable)  LEFT             2         2        2         2         2          (0=absent, 1=impaired, 2=normal, NT=not testable)                          L2      L3     L4     L5       S1          RIGHT          2         2        2         2         2          (0=absent, 1=impaired, 2=normal, NT=not testable)  LEFT             2         2        2         2         2          (0=absent, 1=impaired, 2=normal, NT=not testable)                                                 BLE: WWP, compartments soft and compressible      Vital Signs Last 24 Hrs  T(C): 36.8 (10 Dec 2024 05:05), Max: 36.8 (09 Dec 2024 14:00)  T(F): 98.2 (10 Dec 2024 05:05), Max: 98.3 (09 Dec 2024 14:00)  HR: 100 (10 Dec 2024 05:05) (100 - 107)  BP: 126/82 (10 Dec 2024 05:05) (112/65 - 135/71)  BP(mean): --  RR: 17 (10 Dec 2024 05:05) (16 - 18)  SpO2: 98% (10 Dec 2024 05:05) (95% - 100%)    Parameters below as of 10 Dec 2024 05:05  Patient On (Oxygen Delivery Method): nasal cannula  O2 Flow (L/min): 2      12-08-24 @ 07:01  -  12-09-24 @ 07:00  --------------------------------------------------------  IN: 550 mL / OUT: 700 mL / NET: -150 mL    12-09-24 @ 07:01  -  12-10-24 @ 06:04  --------------------------------------------------------  IN: 1520 mL / OUT: 0 mL / NET: 1520 mL        LABS:                        12.3   6.43  )-----------( 337      ( 09 Dec 2024 09:40 )             36.6     12-09    139  |  103  |  17  ----------------------------<  153[H]  4.7   |  22  |  0.83    Ca    9.2      09 Dec 2024 09:40    TPro  6.9  /  Alb  3.9  /  TBili  0.4  /  DBili  x   /  AST  16  /  ALT  13  /  AlkPhos  76  12-09      PAST MEDICAL & SURGICAL Hx  PAST MEDICAL & SURGICAL HISTORY:  Osteoporosis      History of COPD      Other nonspecific abnormal finding of lung field      Lung cancer      H/O ovarian cystectomy      History of tonsillectomy          MEDICATIONS  (STANDING):  busPIRone 5 milliGRAM(s) Oral two times a day  fluticasone propionate/ salmeterol 100-50 MICROgram(s) Diskus 1 Dose(s) Inhalation two times a day  influenza  Vaccine (HIGH DOSE) 0.5 milliLiter(s) IntraMuscular once  metoprolol tartrate 25 milliGRAM(s) Oral two times a day  tiotropium 2.5 MICROgram(s) Inhaler 2 Puff(s) Inhalation daily    MEDICATIONS  (PRN):  acetaminophen     Tablet .. 650 milliGRAM(s) Oral every 6 hours PRN Temp greater or equal to 38C (100.4F), Mild Pain (1 - 3)  melatonin 3 milliGRAM(s) Oral at bedtime PRN Insomnia        A: 64yFemale w/ L5-S1 R facet septic arthritis, osteomyelitis and small epidural abscess spanning L5-S3. She has no neurodeficits.     Plan:   - FU IR consult for potential biopsy (Dr. Krause)      - Per ID If blood culture is negative, recommend obtaining FU MRI lumbar spine and CT lumbar spine to assess for interval progrression and for pre-procedural planning.  - WBAT   - Follow up 12/8 blood cultures  - Infectious workup   - Appreciate Abx per medicine/ID (recieved CTX and Vanc on 12/8)       - OK to hold off antibiotics to increase cultures yield      - OK to resume Vancomycin and Ceftriaxone 2 gms Q12 post IR procedure    - Ortho to follow     For all questions related to patient care, please reach out via the on-call pager.*     Kortney Dupont, PGY1  Orthopedic Surgery  [facility]: [phone]  [facility]: [phone]  [facility]: [phone]

## 2024-12-10 NOTE — PROGRESS NOTE ADULT - SUBJECTIVE AND OBJECTIVE BOX
Patient is a 64y old  Female who presents with a chief complaint of epidural abscess concern (10 Dec 2024 06:03)      DATE OF SERVICE: 12-10-24 @ 12:45    SUBJECTIVE / OVERNIGHT EVENTS: overnight events noted    ROS:  Resp: No cough no sputum production  CVS: No chest pain no palpitations no orthopnea  GI: no N/V/D  'I feel fine'   no leg weakness       MEDICATIONS  (STANDING):  busPIRone 5 milliGRAM(s) Oral two times a day  fluticasone propionate/ salmeterol 100-50 MICROgram(s) Diskus 1 Dose(s) Inhalation two times a day  influenza  Vaccine (HIGH DOSE) 0.5 milliLiter(s) IntraMuscular once  metoprolol tartrate 25 milliGRAM(s) Oral two times a day  tiotropium 2.5 MICROgram(s) Inhaler 2 Puff(s) Inhalation daily    MEDICATIONS  (PRN):  acetaminophen     Tablet .. 650 milliGRAM(s) Oral every 6 hours PRN Temp greater or equal to 38C (100.4F), Mild Pain (1 - 3)  melatonin 3 milliGRAM(s) Oral at bedtime PRN Insomnia        CAPILLARY BLOOD GLUCOSE        I&O's Summary    09 Dec 2024 07:01  -  10 Dec 2024 07:00  --------------------------------------------------------  IN: 1520 mL / OUT: 0 mL / NET: 1520 mL        Vital Signs Last 24 Hrs  T(C): 36.4 (10 Dec 2024 10:19), Max: 36.8 (09 Dec 2024 14:00)  T(F): 97.5 (10 Dec 2024 10:19), Max: 98.3 (09 Dec 2024 14:00)  HR: 110 (10 Dec 2024 10:19) (100 - 110)  BP: 118/71 (10 Dec 2024 10:19) (112/88 - 135/71)  BP(mean): --  RR: 17 (10 Dec 2024 10:19) (16 - 18)  SpO2: 96% (10 Dec 2024 10:19) (95% - 100%)    PHYSICAL EXAM:  GENERAL: in no apparent distress  HEAD:  Atraumatic, Normocephalic  EYES: EOMI, PERRLA, sclera clear  NECK: Supple, No JVD  CHEST/LUNG: clear b/l, no wheeze  HEART: S1 S2; no murmurs appreciated  ABDOMEN: Soft, Nontender, Bowel sounds present  EXTREMITIES:  No clubbing or cyanosis,  no edema  NEUROLOGY: AO x 3 non-focal no weakness LE  SKIN: No rashes or lesions    LABS:                        12.0   6.16  )-----------( 359      ( 10 Dec 2024 05:46 )             35.6     12-10    139  |  104  |  17  ----------------------------<  96  3.8   |  24  |  0.85    Ca    9.6      10 Dec 2024 05:46    TPro  6.7  /  Alb  3.9  /  TBili  0.3  /  DBili  x   /  AST  13  /  ALT  13  /  AlkPhos  75  12-10          Urinalysis Basic - ( 10 Dec 2024 05:46 )    Color: x / Appearance: x / SG: x / pH: x  Gluc: 96 mg/dL / Ketone: x  / Bili: x / Urobili: x   Blood: x / Protein: x / Nitrite: x   Leuk Esterase: x / RBC: x / WBC x   Sq Epi: x / Non Sq Epi: x / Bacteria: x          All consultant(s) notes reviewed and care discussed with other providers        Contact Number, Dr Allen [phone]

## 2024-12-10 NOTE — PROGRESS NOTE ADULT - SUBJECTIVE AND OBJECTIVE BOX
64yPatient is a 64y old  Female who presents with a chief complaint of epidural abscess concern (10 Dec 2024 12:45)      Interval history:  Afebrile, feels some stiffness in the back, otherwise denies any complains.       Allergies:   No Known Allergies      Antimicrobials:      REVIEW OF SYSTEMS:  No chest pain   No SOB  No abdominal pain  No rash.         Vital Signs Last 24 Hrs  T(C): 36.7 (12-10-24 @ 17:17), Max: 36.8 (12-10-24 @ 05:05)  T(F): 98.1 (12-10-24 @ 17:17), Max: 98.2 (12-10-24 @ 05:05)  HR: 112 (12-10-24 @ 17:17) (100 - 112)  BP: 105/76 (12-10-24 @ 17:17) (105/76 - 135/71)  BP(mean): --  RR: 18 (12-10-24 @ 17:17) (16 - 18)  SpO2: 98% (12-10-24 @ 17:17) (96% - 100%)      PHYSICAL EXAM:  Patient in no acute distress. AAOX3.  Cardiovascular: S1S2 normal.  breathing comfortably   soft, nontender, nondistended.  no edema.  IV sites not inflamed.                             12.0   6.16  )-----------( 359      ( 10 Dec 2024 05:46 )             35.6   12-10    139  |  104  |  17  ----------------------------<  96  3.8   |  24  |  0.85    Ca    9.6      10 Dec 2024 05:46    TPro  6.7  /  Alb  3.9  /  TBili  0.3  /  DBili  x   /  AST  13  /  ALT  13  /  AlkPhos  75  12-10      LIVER FUNCTIONS - ( 10 Dec 2024 05:46 )  Alb: 3.9 g/dL / Pro: 6.7 g/dL / ALK PHOS: 75 U/L / ALT: 13 U/L / AST: 13 U/L / GGT: x               Culture - Blood (collected 08 Dec 2024 11:20)  Source: .Blood BLOOD  Preliminary Report (10 Dec 2024 15:01):    No growth at 48 Hours    Culture - Blood (collected 08 Dec 2024 10:25)  Source: .Blood BLOOD  Preliminary Report (10 Dec 2024 15:01):    No growth at 48 Hours        Radiology:  < from: CT Lumbar Spine w/wo IV Cont (12.10.24 @ 12:59) >  IMPRESSION:  When compared with the prior CT and in association with the   MR performed today the lytic destructive changes involving the right   L5-S1 facet with soft tissue extension into the thecal sac with soft   tissue attenuation in the epidural space identified which has progressed   compared with the prior CT. Extension into the regionof the right neural   foramen is seen more significant compared with the prior CT. Question of   more inferior sacral involvement at the level of S2 not imaged on the   prior axials on the MR but seen on the current study where again soft   tissue attenuation replaces the normal fat however there is also the   presence of Tarlov cysts seen at this level. No rim-enhancing collection   is appreciated  Other degenerative changes as described above        < from: MR Lumbar Spine w/wo IV Cont (12.10.24 @ 09:27) >  IMPRESSION:  Findings most consistent with septic arthritis and osteomyelitis of the   right L5/S1 facet articulation involving the right L5 pedicle and right   sacral ala.  Dorsal epidural enhancement spanning L4-5 L5 to the imaged sacrum with   displacement of the thecal sac to the left and suspicion of significant   infectious involvement of prominent epidural fat ((lipomatosis) given the   low signal T1 and increased signal T2 with associated enhancement. There   is no rim-enhancing collection appreciated. This is seen on the prior   study as well where markedly abnormal soft tissue signal T1 with   enhancement displaces the thecal sac in its intracanal location beginning   at the level of L5 to the left extending down through the level of the   sacrum. Involvement of the right 5 1 neural foramen is seen with   enhancement surrounding the exiting right L5 nerve root.

## 2024-12-10 NOTE — PROGRESS NOTE ADULT - ASSESSMENT
64F w/ hx of Lung CA s/p resection, HTN, anxiety, p/w concern for epidural infection after repeat imaging w/ Dr. Merino. Concern for infection began in October when MRI showed ambiguous findings of infection at L5-S1. Repeat imaging this week revealed progression of infection so she was sent in for infectious workup. She has had RLE radiculopathy for several years. She has not noticed her symptoms to have gotten any worse recently. She has been able to walk without difficulty. Denies focal weakness, numbness/tingling, or radicular sxs. Denies fevers/chills, acute changes in bowel/bladder function, or saddle anesthesia. Denies recent falls/trauma or any other ortho injuries. Community ambulator w/o assistive devices at baseline. She does report recent infection of UTI which was treated with PO abx. Patient otherwise denies fevers chills or back pain. Still has sciatica but is tolerable. Denies any procedures for past several months.    ER/hospital course: afebrile, no leucocytosis    Blood cx obtained    No prior significant micro data on chart review    ESR 46, CRP 9      # Concern for Septic arthritis/ OM/ Epidural abscess  # Elevated inflammatory markers  # Abnormal MRI      - afebrile, no leucocytosis, no systemic signs, no FND  - Had L5/P3shfeemys enhancement in prior MRI in october  - Will need Aspiration/biopsy; please send routine, fungal AFB cultures  - OK to hold off antibiotics to increase cultures yield  - OK to resume Vancomycin and Ceftriaxone 2 gms Q12 post IR procedure  - f/u blood cx      Jesus Martinez-Alanna  Please contact through MS Teams   If no response or past 5 pm/weekend call [phone].    64F w/ hx of Lung CA s/p resection, HTN, anxiety, p/w concern for epidural infection after repeat imaging w/ Dr. Merino. Concern for infection began in October when MRI showed ambiguous findings of infection at L5-S1. Repeat imaging this week revealed progression of infection so she was sent in for infectious workup. She has had RLE radiculopathy for several years. She has not noticed her symptoms to have gotten any worse recently. She has been able to walk without difficulty. Denies focal weakness, numbness/tingling, or radicular sxs. Denies fevers/chills, acute changes in bowel/bladder function, or saddle anesthesia. Denies recent falls/trauma or any other ortho injuries. Community ambulator w/o assistive devices at baseline. She does report recent infection of UTI which was treated with PO abx. Patient otherwise denies fevers chills or back pain. Still has sciatica but is tolerable. Denies any procedures for past several months.    ER/hospital course: afebrile, no leucocytosis    Blood cx obtained    No prior significant micro data on chart review    ESR 46, CRP 9      # Concern for Septic arthritis/ OM/ Epidural abscess  # Elevated inflammatory markers  # Abnormal MRI      - afebrile, no leucocytosis, no systemic signs, no FND  - blood cx NTD   - repeat MRI noted.   - IR consulted for Aspiration/biopsy; please send routine, fungal and AFB cultures  - OK to hold off antibiotics to increase cultures yield  - OK to resume Vancomycin and Ceftriaxone 2 gms Q12 post IR procedure      Jesus Bryan  Please contact through MS Teams   If no response or past 5 pm/weekend call [phone].    64F w/ hx of Lung CA s/p resection, HTN, anxiety, p/w concern for epidural infection after repeat imaging w/ Dr. Merino. Concern for infection began in October when MRI showed ambiguous findings of infection at L5-S1. Repeat imaging this week revealed progression of infection so she was sent in for infectious workup. She has had RLE radiculopathy for several years. She has not noticed her symptoms to have gotten any worse recently. She has been able to walk without difficulty. Denies focal weakness, numbness/tingling, or radicular sxs. Denies fevers/chills, acute changes in bowel/bladder function, or saddle anesthesia. Denies recent falls/trauma or any other ortho injuries. Community ambulator w/o assistive devices at baseline. She does report recent infection of UTI which was treated with PO abx. Patient otherwise denies fevers chills or back pain. Still has sciatica but is tolerable. Denies any procedures for past several months.    ER/hospital course: afebrile, no leucocytosis    Blood cx obtained    No prior significant micro data on chart review    ESR 46, CRP 9      # Concern for Septic arthritis/ OM/ Epidural abscess  # Elevated inflammatory markers  # Abnormal MRI      - afebrile, no leucocytosis, no systemic signs, no FND  - blood cx NTD   - repeat MRI noted.   - IR consulted for Aspiration/biopsy; please send routine, fungal and AFB cultures  - OK to hold off antibiotics to increase cultures yield  - OK to resume Vancomycin and Ceftriaxone post IR procedure      Jesus Bryan  Please contact through MS Teams   If no response or past 5 pm/weekend call [phone].

## 2024-12-11 LAB
ALBUMIN SERPL ELPH-MCNC: 3.8 G/DL — SIGNIFICANT CHANGE UP (ref 3.3–5)
ALP SERPL-CCNC: 72 U/L — SIGNIFICANT CHANGE UP (ref 40–120)
ALT FLD-CCNC: 10 U/L — SIGNIFICANT CHANGE UP (ref 4–33)
ANION GAP SERPL CALC-SCNC: 13 MMOL/L — SIGNIFICANT CHANGE UP (ref 7–14)
AST SERPL-CCNC: 11 U/L — SIGNIFICANT CHANGE UP (ref 4–32)
BASOPHILS # BLD AUTO: 0.09 K/UL — SIGNIFICANT CHANGE UP (ref 0–0.2)
BASOPHILS NFR BLD AUTO: 0.9 % — SIGNIFICANT CHANGE UP (ref 0–2)
BILIRUB SERPL-MCNC: 0.2 MG/DL — SIGNIFICANT CHANGE UP (ref 0.2–1.2)
BUN SERPL-MCNC: 16 MG/DL — SIGNIFICANT CHANGE UP (ref 7–23)
CALCIUM SERPL-MCNC: 9.5 MG/DL — SIGNIFICANT CHANGE UP (ref 8.4–10.5)
CHLORIDE SERPL-SCNC: 103 MMOL/L — SIGNIFICANT CHANGE UP (ref 98–107)
CO2 SERPL-SCNC: 22 MMOL/L — SIGNIFICANT CHANGE UP (ref 22–31)
CREAT SERPL-MCNC: 0.82 MG/DL — SIGNIFICANT CHANGE UP (ref 0.5–1.3)
EGFR: 79 ML/MIN/1.73M2 — SIGNIFICANT CHANGE UP
EOSINOPHIL # BLD AUTO: 0.36 K/UL — SIGNIFICANT CHANGE UP (ref 0–0.5)
EOSINOPHIL NFR BLD AUTO: 3.6 % — SIGNIFICANT CHANGE UP (ref 0–6)
GLUCOSE SERPL-MCNC: 99 MG/DL — SIGNIFICANT CHANGE UP (ref 70–99)
HCT VFR BLD CALC: 34.6 % — SIGNIFICANT CHANGE UP (ref 34.5–45)
HGB BLD-MCNC: 11.6 G/DL — SIGNIFICANT CHANGE UP (ref 11.5–15.5)
IANC: 6.94 K/UL — SIGNIFICANT CHANGE UP (ref 1.8–7.4)
IMM GRANULOCYTES NFR BLD AUTO: 0.6 % — SIGNIFICANT CHANGE UP (ref 0–0.9)
LYMPHOCYTES # BLD AUTO: 1.46 K/UL — SIGNIFICANT CHANGE UP (ref 1–3.3)
LYMPHOCYTES # BLD AUTO: 14.8 % — SIGNIFICANT CHANGE UP (ref 13–44)
MCHC RBC-ENTMCNC: 31.5 PG — SIGNIFICANT CHANGE UP (ref 27–34)
MCHC RBC-ENTMCNC: 33.5 G/DL — SIGNIFICANT CHANGE UP (ref 32–36)
MCV RBC AUTO: 94 FL — SIGNIFICANT CHANGE UP (ref 80–100)
MONOCYTES # BLD AUTO: 0.98 K/UL — HIGH (ref 0–0.9)
MONOCYTES NFR BLD AUTO: 9.9 % — SIGNIFICANT CHANGE UP (ref 2–14)
NEUTROPHILS # BLD AUTO: 6.94 K/UL — SIGNIFICANT CHANGE UP (ref 1.8–7.4)
NEUTROPHILS NFR BLD AUTO: 70.2 % — SIGNIFICANT CHANGE UP (ref 43–77)
NRBC # BLD: 0 /100 WBCS — SIGNIFICANT CHANGE UP (ref 0–0)
NRBC # FLD: 0 K/UL — SIGNIFICANT CHANGE UP (ref 0–0)
PLATELET # BLD AUTO: 336 K/UL — SIGNIFICANT CHANGE UP (ref 150–400)
POTASSIUM SERPL-MCNC: 3.8 MMOL/L — SIGNIFICANT CHANGE UP (ref 3.5–5.3)
POTASSIUM SERPL-SCNC: 3.8 MMOL/L — SIGNIFICANT CHANGE UP (ref 3.5–5.3)
PROT SERPL-MCNC: 6.7 G/DL — SIGNIFICANT CHANGE UP (ref 6–8.3)
RBC # BLD: 3.68 M/UL — LOW (ref 3.8–5.2)
RBC # FLD: 12.5 % — SIGNIFICANT CHANGE UP (ref 10.3–14.5)
SODIUM SERPL-SCNC: 138 MMOL/L — SIGNIFICANT CHANGE UP (ref 135–145)
WBC # BLD: 9.89 K/UL — SIGNIFICANT CHANGE UP (ref 3.8–10.5)
WBC # FLD AUTO: 9.89 K/UL — SIGNIFICANT CHANGE UP (ref 3.8–10.5)

## 2024-12-11 RX ADMIN — FLUTICASONE PROPIONATE AND SALMETEROL XINAFOATE 1 DOSE(S): 45; 21 AEROSOL, METERED RESPIRATORY (INHALATION) at 20:35

## 2024-12-11 RX ADMIN — METOPROLOL TARTRATE 25 MILLIGRAM(S): 100 TABLET, FILM COATED ORAL at 05:00

## 2024-12-11 RX ADMIN — Medication 5 MILLIGRAM(S): at 17:18

## 2024-12-11 RX ADMIN — METOPROLOL TARTRATE 25 MILLIGRAM(S): 100 TABLET, FILM COATED ORAL at 17:18

## 2024-12-11 RX ADMIN — ACETAMINOPHEN 500MG 650 MILLIGRAM(S): 500 TABLET, COATED ORAL at 02:40

## 2024-12-11 RX ADMIN — Medication 5 MILLIGRAM(S): at 05:00

## 2024-12-11 RX ADMIN — Medication 2 PUFF(S): at 09:24

## 2024-12-11 RX ADMIN — FLUTICASONE PROPIONATE AND SALMETEROL XINAFOATE 1 DOSE(S): 45; 21 AEROSOL, METERED RESPIRATORY (INHALATION) at 09:24

## 2024-12-11 RX ADMIN — ACETAMINOPHEN 500MG 650 MILLIGRAM(S): 500 TABLET, COATED ORAL at 02:01

## 2024-12-11 NOTE — CHART NOTE - NSCHARTNOTEFT_GEN_A_CORE
IR Pre-Procedure Note    Patient Age:   64y    Patient Gender:   Female    Procedure (including site / side if known): CT guided RIGHT L5/S1 facet joint aspiration    Diagnosis / Indication: Patient is a 64y old  Female who presents with a chief complaint of epidural abscess concern (11 Dec 2024 13:02)      Interventional Radiology Attending Physician: Dr. Krause     Ordering Attending Physician:    PAST MEDICAL & SURGICAL HISTORY:  Osteoporosis      History of COPD      Other nonspecific abnormal finding of lung field      Lung cancer      H/O ovarian cystectomy      History of tonsillectomy           Pertinent Labs:   CBC Full  -  ( 11 Dec 2024 06:10 )  WBC Count : 9.89 K/uL  RBC Count : 3.68 M/uL  Hemoglobin : 11.6 g/dL  Hematocrit : 34.6 %  Platelet Count - Automated : 336 K/uL  Mean Cell Volume : 94.0 fL  Mean Cell Hemoglobin : 31.5 pg  Mean Cell Hemoglobin Concentration : 33.5 g/dL  Auto Neutrophil # : 6.94 K/uL  Auto Lymphocyte # : 1.46 K/uL  Auto Monocyte # : 0.98 K/uL  Auto Eosinophil # : 0.36 K/uL  Auto Basophil # : 0.09 K/uL  Auto Neutrophil % : 70.2 %  Auto Lymphocyte % : 14.8 %  Auto Monocyte % : 9.9 %  Auto Eosinophil % : 3.6 %  Auto Basophil % : 0.9 %    12-11    138  |  103  |  16  ----------------------------<  99  3.8   |  22  |  0.82    Ca    9.5      11 Dec 2024 06:10    TPro  6.7  /  Alb  3.8  /  TBili  0.2  /  DBili  x   /  AST  11  /  ALT  10  /  AlkPhos  72  12-11        Patient / Family aware of procedure:   [  ] Y   [  ] N

## 2024-12-11 NOTE — CHART NOTE - NSCHARTNOTEFT_GEN_A_CORE
IR Chart Note:    64y Female with concern for right L5-S1 osteomyelitis/septic joint consulted to IR for bone biopsy. Blood cultures showing NGTD. Repeat MRI and CT imaging showing persistent changes at L5-S1, most prominent at right facet joint     Plan:   -IR to attempt CT guided RIGHT L5/S1 facet joint aspiration for Friday 12/13/2024  -Please place order for IR Procedure, approving attending Dr. Krause   -NPO past midnight prior to procedure  -hold DVT PPX in AM   -AM CBC, BMP, and coags    -discussed with primary team      Rome Olivares MD PGY IV  Interventional Radiology    For EMERGENT inquiries/questions:  [facility]-[phone]  [facility]-p.[phone] ([phone])    Available on [employer] TEAMS for all non-urgent questions  Non-emergent consults: Please place a sunrise order "Consult-Interventional Radiology" with an appropriate callback number.    For questions about scheduling during appropriate work hours, call IR :  [facility]: [phone]  [facility]: [phone]    For outpatient IR booking:  [facility]: [phone]  [facility]: [phone]

## 2024-12-11 NOTE — PROGRESS NOTE ADULT - SUBJECTIVE AND OBJECTIVE BOX
Orthopedic Surgery Progress Note     S: Patient seen and examined today. No acute events overnight. Pain is well controlled. Denies f/c, chest pain, shortness of breath, dizziness.      Physical Exam:  Gen: NAD  Resp: Symmetric chest rise, No increased WOB      Spine:    Motor exam:          Upper extremity         C5 (Shoulder Abd)    C6 (Elbow flex)   C7 (Elbow ext)   C8 (Finger flex) T1 (finger abd)         R         5/5                 5/5                       5/5                      5/5                         5/5          L          5/5                 5/5                      5/5                      5/5                         5/5                   Lower extremity           L2 (Hip flex)  L3 (knee ext)  L4 (Dorsi flex)  L5 (EHL)  S1 (Plantar flex)                                               R        5/5            5/5             5/5                    5/5          5/5      L        5/5            5/5             5/5                   5/5           5/5      Sensory exam:                        C5      C6      C7      C8       T1          RIGHT          2         2        2         2         2          (0=absent, 1=impaired, 2=normal, NT=not testable)  LEFT             2         2        2         2         2          (0=absent, 1=impaired, 2=normal, NT=not testable)                          L2      L3     L4     L5       S1          RIGHT          2         2        2         2         2          (0=absent, 1=impaired, 2=normal, NT=not testable)  LEFT             2         2        2         2         2          (0=absent, 1=impaired, 2=normal, NT=not testable)                                                 BLE: WWP, compartments soft and compressible      Vital Signs Last 24 Hrs  T(C): 36.6 (11 Dec 2024 05:00), Max: 36.8 (10 Dec 2024 22:00)  T(F): 97.9 (11 Dec 2024 05:00), Max: 98.3 (10 Dec 2024 22:00)  HR: 103 (11 Dec 2024 05:00) (100 - 112)  BP: 131/71 (11 Dec 2024 05:00) (105/76 - 131/71)  BP(mean): --  RR: 19 (11 Dec 2024 05:00) (17 - 19)  SpO2: 98% (11 Dec 2024 05:00) (96% - 99%)    Parameters below as of 11 Dec 2024 05:00  Patient On (Oxygen Delivery Method): room air        12-09-24 @ 07:01  -  12-10-24 @ 07:00  --------------------------------------------------------  IN: 1520 mL / OUT: 0 mL / NET: 1520 mL    12-10-24 @ 07:01  -  12-11-24 @ 06:55  --------------------------------------------------------  IN: 1520 mL / OUT: 0 mL / NET: 1520 mL        LABS:                        12.0   6.16  )-----------( 359      ( 10 Dec 2024 05:46 )             35.6     12-10    139  |  104  |  17  ----------------------------<  96  3.8   |  24  |  0.85    Ca    9.6      10 Dec 2024 05:46    TPro  6.7  /  Alb  3.9  /  TBili  0.3  /  DBili  x   /  AST  13  /  ALT  13  /  AlkPhos  75  12-10      PAST MEDICAL & SURGICAL Hx  PAST MEDICAL & SURGICAL HISTORY:  Osteoporosis      History of COPD      Other nonspecific abnormal finding of lung field      Lung cancer      H/O ovarian cystectomy      History of tonsillectomy          MEDICATIONS  (STANDING):  busPIRone 5 milliGRAM(s) Oral two times a day  fluticasone propionate/ salmeterol 100-50 MICROgram(s) Diskus 1 Dose(s) Inhalation two times a day  influenza  Vaccine (HIGH DOSE) 0.5 milliLiter(s) IntraMuscular once  metoprolol tartrate 25 milliGRAM(s) Oral two times a day  tiotropium 2.5 MICROgram(s) Inhaler 2 Puff(s) Inhalation daily    MEDICATIONS  (PRN):  acetaminophen     Tablet .. 650 milliGRAM(s) Oral every 6 hours PRN Temp greater or equal to 38C (100.4F), Mild Pain (1 - 3)  melatonin 3 milliGRAM(s) Oral at bedtime PRN Insomnia      A: 64yFemale w/ L5-S1 R facet septic arthritis, osteomyelitis and small epidural abscess spanning L5-S3. She has no neurodeficits.     Plan:   - FU IR consult for potential biopsy (Dr. Krause)      - Per ID If blood culture is negative, recommend obtaining FU MRI lumbar spine and CT lumbar spine to assess for interval progrression and for pre-procedural planning.      - 12/11 CT w/ evidence Extension into the region of the right neural foramen is seen more significant compared with the prior CT.   - WBAT   - Follow up 12/8 blood cultures- NGTD   - Infectious workup   - Appreciate Abx per medicine/ID (recieved CTX and Vanc on 12/8)       - OK to hold off antibiotics to increase cultures yield      - OK to resume Vancomycin and Ceftriaxone 2 gms Q12 post IR procedure  - Ortho to follow     For all questions related to patient care, please reach out via the on-call pager.*     Kortney Dupont, PGY1  Orthopedic Surgery  [facility]: [id number]  [facility]: [id number]  [facility]: [id number]

## 2024-12-11 NOTE — PROGRESS NOTE ADULT - SUBJECTIVE AND OBJECTIVE BOX
Patient is a 64y old  Female who presents with a chief complaint of epidural abscess concern (11 Dec 2024 06:55)      DATE OF SERVICE: 12-11-24 @ 13:03    SUBJECTIVE / OVERNIGHT EVENTS: overnight events noted    ROS:  Resp: No cough no sputum production  CVS: No chest pain no palpitations no orthopnea  GI: no N/V/D  : no dysuria, no hematuria  Neuro: no weakness no paresthesias  remains totally asymptomatic         MEDICATIONS  (STANDING):  busPIRone 5 milliGRAM(s) Oral two times a day  fluticasone propionate/ salmeterol 100-50 MICROgram(s) Diskus 1 Dose(s) Inhalation two times a day  influenza  Vaccine (HIGH DOSE) 0.5 milliLiter(s) IntraMuscular once  metoprolol tartrate 25 milliGRAM(s) Oral two times a day  tiotropium 2.5 MICROgram(s) Inhaler 2 Puff(s) Inhalation daily    MEDICATIONS  (PRN):  acetaminophen     Tablet .. 650 milliGRAM(s) Oral every 6 hours PRN Temp greater or equal to 38C (100.4F), Mild Pain (1 - 3)  melatonin 3 milliGRAM(s) Oral at bedtime PRN Insomnia        CAPILLARY BLOOD GLUCOSE        I&O's Summary    10 Dec 2024 07:01  -  11 Dec 2024 07:00  --------------------------------------------------------  IN: 1520 mL / OUT: 0 mL / NET: 1520 mL    11 Dec 2024 07:01  -  11 Dec 2024 13:03  --------------------------------------------------------  IN: 240 mL / OUT: 0 mL / NET: 240 mL        Vital Signs Last 24 Hrs  T(C): 36.5 (11 Dec 2024 10:03), Max: 36.8 (10 Dec 2024 22:00)  T(F): 97.7 (11 Dec 2024 10:03), Max: 98.3 (10 Dec 2024 22:00)  HR: 105 (11 Dec 2024 10:03) (100 - 112)  BP: 133/75 (11 Dec 2024 10:03) (105/76 - 133/75)  BP(mean): --  RR: 18 (11 Dec 2024 10:03) (17 - 19)  SpO2: 97% (11 Dec 2024 10:03) (96% - 99%)    PHYSICAL EXAM:  CHEST/LUNG: clear   HEART: S1 S2; no murmurs   ABDOMEN: Soft, Nontender  EXTREMITIES:    no edema  NEUROLOGY: AO x 3 non-focal  no LE weakness or sensory loss  SKIN: No rashes or lesions    LABS:                        11.6   9.89  )-----------( 336      ( 11 Dec 2024 06:10 )             34.6     12-11    138  |  103  |  16  ----------------------------<  99  3.8   |  22  |  0.82    Ca    9.5      11 Dec 2024 06:10    TPro  6.7  /  Alb  3.8  /  TBili  0.2  /  DBili  x   /  AST  11  /  ALT  10  /  AlkPhos  72  12-11          Urinalysis Basic - ( 11 Dec 2024 06:10 )    Color: x / Appearance: x / SG: x / pH: x  Gluc: 99 mg/dL / Ketone: x  / Bili: x / Urobili: x   Blood: x / Protein: x / Nitrite: x   Leuk Esterase: x / RBC: x / WBC x   Sq Epi: x / Non Sq Epi: x / Bacteria: x          All consultant(s) notes reviewed and care discussed with other providers        Contact Number, Dr Allen [phone]

## 2024-12-12 LAB
ALBUMIN SERPL ELPH-MCNC: 4.1 G/DL — SIGNIFICANT CHANGE UP (ref 3.3–5)
ALP SERPL-CCNC: 77 U/L — SIGNIFICANT CHANGE UP (ref 40–120)
ALT FLD-CCNC: 10 U/L — SIGNIFICANT CHANGE UP (ref 4–33)
ANION GAP SERPL CALC-SCNC: 12 MMOL/L — SIGNIFICANT CHANGE UP (ref 7–14)
AST SERPL-CCNC: 14 U/L — SIGNIFICANT CHANGE UP (ref 4–32)
BASOPHILS # BLD AUTO: 0.07 K/UL — SIGNIFICANT CHANGE UP (ref 0–0.2)
BASOPHILS NFR BLD AUTO: 0.8 % — SIGNIFICANT CHANGE UP (ref 0–2)
BILIRUB SERPL-MCNC: 0.2 MG/DL — SIGNIFICANT CHANGE UP (ref 0.2–1.2)
BUN SERPL-MCNC: 16 MG/DL — SIGNIFICANT CHANGE UP (ref 7–23)
CALCIUM SERPL-MCNC: 9.7 MG/DL — SIGNIFICANT CHANGE UP (ref 8.4–10.5)
CHLORIDE SERPL-SCNC: 102 MMOL/L — SIGNIFICANT CHANGE UP (ref 98–107)
CO2 SERPL-SCNC: 24 MMOL/L — SIGNIFICANT CHANGE UP (ref 22–31)
CREAT SERPL-MCNC: 0.84 MG/DL — SIGNIFICANT CHANGE UP (ref 0.5–1.3)
EGFR: 77 ML/MIN/1.73M2 — SIGNIFICANT CHANGE UP
EOSINOPHIL # BLD AUTO: 0.37 K/UL — SIGNIFICANT CHANGE UP (ref 0–0.5)
EOSINOPHIL NFR BLD AUTO: 4.3 % — SIGNIFICANT CHANGE UP (ref 0–6)
GLUCOSE SERPL-MCNC: 100 MG/DL — HIGH (ref 70–99)
HCT VFR BLD CALC: 35.9 % — SIGNIFICANT CHANGE UP (ref 34.5–45)
HGB BLD-MCNC: 12.3 G/DL — SIGNIFICANT CHANGE UP (ref 11.5–15.5)
IANC: 5.56 K/UL — SIGNIFICANT CHANGE UP (ref 1.8–7.4)
IMM GRANULOCYTES NFR BLD AUTO: 0.5 % — SIGNIFICANT CHANGE UP (ref 0–0.9)
LYMPHOCYTES # BLD AUTO: 1.68 K/UL — SIGNIFICANT CHANGE UP (ref 1–3.3)
LYMPHOCYTES # BLD AUTO: 19.6 % — SIGNIFICANT CHANGE UP (ref 13–44)
MCHC RBC-ENTMCNC: 32 PG — SIGNIFICANT CHANGE UP (ref 27–34)
MCHC RBC-ENTMCNC: 34.3 G/DL — SIGNIFICANT CHANGE UP (ref 32–36)
MCV RBC AUTO: 93.5 FL — SIGNIFICANT CHANGE UP (ref 80–100)
MONOCYTES # BLD AUTO: 0.84 K/UL — SIGNIFICANT CHANGE UP (ref 0–0.9)
MONOCYTES NFR BLD AUTO: 9.8 % — SIGNIFICANT CHANGE UP (ref 2–14)
NEUTROPHILS # BLD AUTO: 5.56 K/UL — SIGNIFICANT CHANGE UP (ref 1.8–7.4)
NEUTROPHILS NFR BLD AUTO: 65 % — SIGNIFICANT CHANGE UP (ref 43–77)
NRBC # BLD: 0 /100 WBCS — SIGNIFICANT CHANGE UP (ref 0–0)
NRBC # FLD: 0 K/UL — SIGNIFICANT CHANGE UP (ref 0–0)
PLATELET # BLD AUTO: 360 K/UL — SIGNIFICANT CHANGE UP (ref 150–400)
POTASSIUM SERPL-MCNC: 3.9 MMOL/L — SIGNIFICANT CHANGE UP (ref 3.5–5.3)
POTASSIUM SERPL-SCNC: 3.9 MMOL/L — SIGNIFICANT CHANGE UP (ref 3.5–5.3)
PROT SERPL-MCNC: 7.1 G/DL — SIGNIFICANT CHANGE UP (ref 6–8.3)
RBC # BLD: 3.84 M/UL — SIGNIFICANT CHANGE UP (ref 3.8–5.2)
RBC # FLD: 12.8 % — SIGNIFICANT CHANGE UP (ref 10.3–14.5)
SODIUM SERPL-SCNC: 138 MMOL/L — SIGNIFICANT CHANGE UP (ref 135–145)
WBC # BLD: 8.56 K/UL — SIGNIFICANT CHANGE UP (ref 3.8–10.5)
WBC # FLD AUTO: 8.56 K/UL — SIGNIFICANT CHANGE UP (ref 3.8–10.5)

## 2024-12-12 PROCEDURE — 99232 SBSQ HOSP IP/OBS MODERATE 35: CPT

## 2024-12-12 RX ADMIN — METOPROLOL TARTRATE 25 MILLIGRAM(S): 100 TABLET, FILM COATED ORAL at 17:50

## 2024-12-12 RX ADMIN — FLUTICASONE PROPIONATE AND SALMETEROL XINAFOATE 1 DOSE(S): 45; 21 AEROSOL, METERED RESPIRATORY (INHALATION) at 09:04

## 2024-12-12 RX ADMIN — Medication 2 PUFF(S): at 09:04

## 2024-12-12 RX ADMIN — Medication 5 MILLIGRAM(S): at 17:50

## 2024-12-12 RX ADMIN — METOPROLOL TARTRATE 25 MILLIGRAM(S): 100 TABLET, FILM COATED ORAL at 05:06

## 2024-12-12 RX ADMIN — Medication 5 MILLIGRAM(S): at 05:06

## 2024-12-12 RX ADMIN — FLUTICASONE PROPIONATE AND SALMETEROL XINAFOATE 1 DOSE(S): 45; 21 AEROSOL, METERED RESPIRATORY (INHALATION) at 21:08

## 2024-12-12 NOTE — PROGRESS NOTE ADULT - SUBJECTIVE AND OBJECTIVE BOX
Patient is a 64y old  Female who presents with a chief complaint of epidural abscess concern (12 Dec 2024 06:28)      DATE OF SERVICE: 12-12-24 @ 11:03    SUBJECTIVE / OVERNIGHT EVENTS: overnight events noted    ROS:  Resp: No cough no sputum production  CVS: No chest pain no palpitations no orthopnea  GI: no N/V/D        MEDICATIONS  (STANDING):  busPIRone 5 milliGRAM(s) Oral two times a day  fluticasone propionate/ salmeterol 100-50 MICROgram(s) Diskus 1 Dose(s) Inhalation two times a day  influenza  Vaccine (HIGH DOSE) 0.5 milliLiter(s) IntraMuscular once  metoprolol tartrate 25 milliGRAM(s) Oral two times a day  tiotropium 2.5 MICROgram(s) Inhaler 2 Puff(s) Inhalation daily    MEDICATIONS  (PRN):  acetaminophen     Tablet .. 650 milliGRAM(s) Oral every 6 hours PRN Temp greater or equal to 38C (100.4F), Mild Pain (1 - 3)  melatonin 3 milliGRAM(s) Oral at bedtime PRN Insomnia        CAPILLARY BLOOD GLUCOSE        I&O's Summary    11 Dec 2024 07:01  -  12 Dec 2024 07:00  --------------------------------------------------------  IN: 1520 mL / OUT: 0 mL / NET: 1520 mL        Vital Signs Last 24 Hrs  T(C): 36.7 (12 Dec 2024 10:00), Max: 36.8 (11 Dec 2024 17:07)  T(F): 98.1 (12 Dec 2024 10:00), Max: 98.2 (11 Dec 2024 17:07)  HR: 104 (12 Dec 2024 10:00) (97 - 127)  BP: 103/72 (12 Dec 2024 10:00) (103/72 - 140/83)  BP(mean): --  RR: 19 (12 Dec 2024 10:00) (17 - 20)  SpO2: 100% (12 Dec 2024 10:00) (96% - 100%)    PHYSICAL EXAM:  CHEST/LUNG: clear   HEART: S1 S2; no murmurs   ABDOMEN: Soft, Nontender  EXTREMITIES:    no edema  NEUROLOGY: AO x 3 non-focal  no LE weakness or sensory loss  SKIN: No rashes or lesions    LABS:                        12.3   8.56  )-----------( 360      ( 12 Dec 2024 06:41 )             35.9     12-12    138  |  102  |  16  ----------------------------<  100[H]  3.9   |  24  |  0.84    Ca    9.7      12 Dec 2024 06:41    TPro  7.1  /  Alb  4.1  /  TBili  0.2  /  DBili  x   /  AST  14  /  ALT  10  /  AlkPhos  77  12-12          Urinalysis Basic - ( 12 Dec 2024 06:41 )    Color: x / Appearance: x / SG: x / pH: x  Gluc: 100 mg/dL / Ketone: x  / Bili: x / Urobili: x   Blood: x / Protein: x / Nitrite: x   Leuk Esterase: x / RBC: x / WBC x   Sq Epi: x / Non Sq Epi: x / Bacteria: x          All consultant(s) notes reviewed and care discussed with other providers        Contact Number, Dr Allen [phone]

## 2024-12-12 NOTE — PROGRESS NOTE ADULT - ASSESSMENT
64F w/ hx of Lung CA s/p resection, HTN, anxiety, p/w concern for epidural infection after repeat imaging w/ Dr. Merino. Concern for infection began in October when MRI showed ambiguous findings of infection at L5-S1. Repeat imaging this week revealed progression of infection so she was sent in for infectious workup. She has had RLE radiculopathy for several years. She has not noticed her symptoms to have gotten any worse recently. She has been able to walk without difficulty. Denies focal weakness, numbness/tingling, or radicular sxs. Denies fevers/chills, acute changes in bowel/bladder function, or saddle anesthesia. Denies recent falls/trauma or any other ortho injuries. Community ambulator w/o assistive devices at baseline. She does report recent infection of UTI which was treated with PO abx. Patient otherwise denies fevers chills or back pain. Still has sciatica but is tolerable. Denies any procedures for past several months.    ER/hospital course: afebrile, no leucocytosis    Blood cx obtained    No prior significant micro data on chart review    ESR 46, CRP 9      # Concern for Septic arthritis/ OM/ Epidural abscess  # Elevated inflammatory markers  # Abnormal MRI      - afebrile, no leucocytosis, no systemic signs, no FND  - blood cx NTD   - repeat MRI noted.   - IR consulted for Aspiration/biopsy; planned for tomorrow, please send routine, fungal and AFB cultures  - OK to hold off antibiotics to increase cultures yield  - please start Vancomycin and Ceftriaxone post IR procedure      Jesus Bryan  Please contact through MS Teams   If no response or past 5 pm/weekend call [phone].     My colleague will cover 12/13-12/15

## 2024-12-12 NOTE — PROGRESS NOTE ADULT - SUBJECTIVE AND OBJECTIVE BOX
Orthopedic Surgery Progress Note     S: Patient seen and examined today. Tachy to 120s overnight that was managed with metoprolol. Does not endorse pain. Abulated in the hallway yesterday, Denies f/c, chest pain, shortness of breath, dizziness.      Physical Exam:  Gen: NAD  Resp: Symmetric chest rise, No increased WOB      Spine:    Skin intact w/o ecchymosis or erythema   Motor exam:          Upper extremity         C5 (Shoulder Abd)    C6 (Elbow flex)   C7 (Elbow ext)   C8 (Finger flex) T1 (finger abd)         R         5/5                 5/5                       5/5                      5/5                         5/5          L          5/5                 5/5                      5/5                      5/5                         5/5                   Lower extremity           L2 (Hip flex)  L3 (knee ext)  L4 (Dorsi flex)  L5 (EHL)  S1 (Plantar flex)                                               R        5/5            5/5             5/5                    5/5          5/5      L        5/5            5/5             5/5                   5/5           5/5      Sensory exam:                        C5      C6      C7      C8       T1          RIGHT          2         2        2         2         2          (0=absent, 1=impaired, 2=normal, NT=not testable)  LEFT             2         2        2         2         2          (0=absent, 1=impaired, 2=normal, NT=not testable)                          L2      L3     L4     L5       S1          RIGHT          2         2        2         2         2          (0=absent, 1=impaired, 2=normal, NT=not testable)  LEFT             2         2        2         2         2          (0=absent, 1=impaired, 2=normal, NT=not testable)                                                 BLE: WWP, compartments soft and compressible      Vital Signs Last 24 Hrs  T(C): 36.7 (12 Dec 2024 05:03), Max: 36.8 (11 Dec 2024 17:07)  T(F): 98.1 (12 Dec 2024 05:03), Max: 98.2 (11 Dec 2024 17:07)  HR: 100 (12 Dec 2024 05:03) (97 - 127)  BP: 140/83 (12 Dec 2024 05:03) (108/55 - 140/83)  BP(mean): --  RR: 20 (12 Dec 2024 05:03) (17 - 20)  SpO2: 96% (12 Dec 2024 05:03) (96% - 98%)    Parameters below as of 12 Dec 2024 05:03  Patient On (Oxygen Delivery Method): room air        12-10-24 @ 07:01  -  12-11-24 @ 07:00  --------------------------------------------------------  IN: 1520 mL / OUT: 0 mL / NET: 1520 mL    12-11-24 @ 07:01  -  12-12-24 @ 06:28  --------------------------------------------------------  IN: 1520 mL / OUT: 0 mL / NET: 1520 mL        LABS:                        11.6   9.89  )-----------( 336      ( 11 Dec 2024 06:10 )             34.6     12-11    138  |  103  |  16  ----------------------------<  99  3.8   |  22  |  0.82    Ca    9.5      11 Dec 2024 06:10    TPro  6.7  /  Alb  3.8  /  TBili  0.2  /  DBili  x   /  AST  11  /  ALT  10  /  AlkPhos  72  12-11      PAST MEDICAL & SURGICAL Hx  PAST MEDICAL & SURGICAL HISTORY:  Osteoporosis      History of COPD      Other nonspecific abnormal finding of lung field      Lung cancer      H/O ovarian cystectomy      History of tonsillectomy          MEDICATIONS  (STANDING):  busPIRone 5 milliGRAM(s) Oral two times a day  fluticasone propionate/ salmeterol 100-50 MICROgram(s) Diskus 1 Dose(s) Inhalation two times a day  influenza  Vaccine (HIGH DOSE) 0.5 milliLiter(s) IntraMuscular once  metoprolol tartrate 25 milliGRAM(s) Oral two times a day  tiotropium 2.5 MICROgram(s) Inhaler 2 Puff(s) Inhalation daily    MEDICATIONS  (PRN):  acetaminophen     Tablet .. 650 milliGRAM(s) Oral every 6 hours PRN Temp greater or equal to 38C (100.4F), Mild Pain (1 - 3)  melatonin 3 milliGRAM(s) Oral at bedtime PRN Insomnia      A: 64yFemale w/ L5-S1 R facet septic arthritis, osteomyelitis and small epidural abscess spanning L5-S3. She has no neurodeficits.     Plan:   - IR CT guided biopsy of R L5/S1 fecet joint aspiration on 12/13  - 12/11 CT w/ evidence Extension into the region of the right neural foramen is seen more significant compared with the prior CT.   - WBAT   - Follow up 12/8 blood cultures- NGTD   - Appreciate Abx per medicine/ID (recieved CTX and Vanc on 12/8)       - OK to resume Vancomycin and Ceftriaxone 2 gms Q12 post IR procedure  - Ortho to follow     For all questions related to patient care, please reach out via the on-call pager.*     Kortney Dupont, PGY1  Orthopedic Surgery  [facility]: [phone]  [facility]: [id number]  [facility]: [phone] Orthopedic Surgery Progress Note     S: Patient seen and examined today. Tachy to 120s overnight that was managed with metoprolol. Does not endorse pain. Ambulated in the hallway yesterday, Denies f/c, chest pain, shortness of breath, dizziness.      Physical Exam:  Gen: NAD  Resp: Symmetric chest rise, No increased WOB      Spine:    Skin intact w/o ecchymosis or erythema   Motor exam:          Upper extremity         C5 (Shoulder Abd)    C6 (Elbow flex)   C7 (Elbow ext)   C8 (Finger flex) T1 (finger abd)         R         5/5                 5/5                       5/5                      5/5                         5/5          L          5/5                 5/5                      5/5                      5/5                         5/5                   Lower extremity           L2 (Hip flex)  L3 (knee ext)  L4 (Dorsi flex)  L5 (EHL)  S1 (Plantar flex)                                               R        5/5            5/5             5/5                    5/5          5/5      L        5/5            5/5             5/5                   5/5           5/5      Sensory exam:                        C5      C6      C7      C8       T1          RIGHT          2         2        2         2         2          (0=absent, 1=impaired, 2=normal, NT=not testable)  LEFT             2         2        2         2         2          (0=absent, 1=impaired, 2=normal, NT=not testable)                          L2      L3     L4     L5       S1          RIGHT          2         2        2         2         2          (0=absent, 1=impaired, 2=normal, NT=not testable)  LEFT             2         2        2         2         2          (0=absent, 1=impaired, 2=normal, NT=not testable)                                                 BLE: WWP, compartments soft and compressible      Vital Signs Last 24 Hrs  T(C): 36.7 (12 Dec 2024 05:03), Max: 36.8 (11 Dec 2024 17:07)  T(F): 98.1 (12 Dec 2024 05:03), Max: 98.2 (11 Dec 2024 17:07)  HR: 100 (12 Dec 2024 05:03) (97 - 127)  BP: 140/83 (12 Dec 2024 05:03) (108/55 - 140/83)  BP(mean): --  RR: 20 (12 Dec 2024 05:03) (17 - 20)  SpO2: 96% (12 Dec 2024 05:03) (96% - 98%)    Parameters below as of 12 Dec 2024 05:03  Patient On (Oxygen Delivery Method): room air        12-10-24 @ 07:01  -  12-11-24 @ 07:00  --------------------------------------------------------  IN: 1520 mL / OUT: 0 mL / NET: 1520 mL    12-11-24 @ 07:01  -  12-12-24 @ 06:28  --------------------------------------------------------  IN: 1520 mL / OUT: 0 mL / NET: 1520 mL        LABS:                        11.6   9.89  )-----------( 336      ( 11 Dec 2024 06:10 )             34.6     12-11    138  |  103  |  16  ----------------------------<  99  3.8   |  22  |  0.82    Ca    9.5      11 Dec 2024 06:10    TPro  6.7  /  Alb  3.8  /  TBili  0.2  /  DBili  x   /  AST  11  /  ALT  10  /  AlkPhos  72  12-11      PAST MEDICAL & SURGICAL Hx  PAST MEDICAL & SURGICAL HISTORY:  Osteoporosis      History of COPD      Other nonspecific abnormal finding of lung field      Lung cancer      H/O ovarian cystectomy      History of tonsillectomy          MEDICATIONS  (STANDING):  busPIRone 5 milliGRAM(s) Oral two times a day  fluticasone propionate/ salmeterol 100-50 MICROgram(s) Diskus 1 Dose(s) Inhalation two times a day  influenza  Vaccine (HIGH DOSE) 0.5 milliLiter(s) IntraMuscular once  metoprolol tartrate 25 milliGRAM(s) Oral two times a day  tiotropium 2.5 MICROgram(s) Inhaler 2 Puff(s) Inhalation daily    MEDICATIONS  (PRN):  acetaminophen     Tablet .. 650 milliGRAM(s) Oral every 6 hours PRN Temp greater or equal to 38C (100.4F), Mild Pain (1 - 3)  melatonin 3 milliGRAM(s) Oral at bedtime PRN Insomnia      A: 64yFemale w/ L5-S1 R facet septic arthritis, osteomyelitis and small epidural abscess spanning L5-S3. She has no neurodeficits.     Plan:   - IR CT guided biopsy of R L5/S1 fecet joint aspiration on 12/13  - 12/11 CT w/ evidence Extension into the region of the right neural foramen is seen more significant compared with the prior CT.   - WBAT   - Follow up 12/8 blood cultures- NGTD   - Appreciate Abx per medicine/ID (recieved CTX and Vanc on 12/8)       - OK to resume Vancomycin and Ceftriaxone 2 gms Q12 post IR procedure  - Ortho to follow     For all questions related to patient care, please reach out via the on-call pager.*     Kortney Dupont, PGY1  Orthopedic Surgery  [facility]: [phone]  [facility]: [id number]  [facility] [phone]

## 2024-12-12 NOTE — PROGRESS NOTE ADULT - SUBJECTIVE AND OBJECTIVE BOX
64yPatient is a 64y old  Female who presents with a chief complaint of epidural abscess concern (12 Dec 2024 11:03)      Interval history:  Afebrile, awaiting aspiration.       Allergies:   No Known Allergies      Antimicrobials:      REVIEW OF SYSTEMS:  No chest pain   No SOB  No abdominal pain  No rash.       Vital Signs Last 24 Hrs  T(C): 36.3 (12-12-24 @ 20:58), Max: 36.7 (12-12-24 @ 05:03)  T(F): 97.3 (12-12-24 @ 20:58), Max: 98.1 (12-12-24 @ 05:03)  HR: 89 (12-12-24 @ 20:58) (89 - 105)  BP: 100/59 (12-12-24 @ 20:58) (100/59 - 140/83)  BP(mean): --  RR: 17 (12-12-24 @ 20:58) (17 - 20)  SpO2: 96% (12-12-24 @ 20:58) (96% - 100%)      PHYSICAL EXAM:  Pt in no acute distress, alert, awake.   breathing comfortably   non distended abdomen  no edema LE   no phlebitis                             12.3   8.56  )-----------( 360      ( 12 Dec 2024 06:41 )             35.9   12-12    138  |  102  |  16  ----------------------------<  100[H]  3.9   |  24  |  0.84    Ca    9.7      12 Dec 2024 06:41    TPro  7.1  /  Alb  4.1  /  TBili  0.2  /  DBili  x   /  AST  14  /  ALT  10  /  AlkPhos  77  12-12      LIVER FUNCTIONS - ( 12 Dec 2024 06:41 )  Alb: 4.1 g/dL / Pro: 7.1 g/dL / ALK PHOS: 77 U/L / ALT: 10 U/L / AST: 14 U/L / GGT: x

## 2024-12-13 LAB
ALBUMIN SERPL ELPH-MCNC: 4 G/DL — SIGNIFICANT CHANGE UP (ref 3.3–5)
ALP SERPL-CCNC: 74 U/L — SIGNIFICANT CHANGE UP (ref 40–120)
ALT FLD-CCNC: 12 U/L — SIGNIFICANT CHANGE UP (ref 4–33)
ANION GAP SERPL CALC-SCNC: 15 MMOL/L — HIGH (ref 7–14)
APTT BLD: 30.9 SEC — SIGNIFICANT CHANGE UP (ref 24.5–35.6)
AST SERPL-CCNC: 13 U/L — SIGNIFICANT CHANGE UP (ref 4–32)
BILIRUB SERPL-MCNC: 0.3 MG/DL — SIGNIFICANT CHANGE UP (ref 0.2–1.2)
BUN SERPL-MCNC: 18 MG/DL — SIGNIFICANT CHANGE UP (ref 7–23)
CALCIUM SERPL-MCNC: 9.5 MG/DL — SIGNIFICANT CHANGE UP (ref 8.4–10.5)
CHLORIDE SERPL-SCNC: 102 MMOL/L — SIGNIFICANT CHANGE UP (ref 98–107)
CO2 SERPL-SCNC: 23 MMOL/L — SIGNIFICANT CHANGE UP (ref 22–31)
CREAT SERPL-MCNC: 0.9 MG/DL — SIGNIFICANT CHANGE UP (ref 0.5–1.3)
CULTURE RESULTS: SIGNIFICANT CHANGE UP
CULTURE RESULTS: SIGNIFICANT CHANGE UP
EGFR: 71 ML/MIN/1.73M2 — SIGNIFICANT CHANGE UP
GLUCOSE SERPL-MCNC: 89 MG/DL — SIGNIFICANT CHANGE UP (ref 70–99)
GRAM STN FLD: SIGNIFICANT CHANGE UP
HCT VFR BLD CALC: 38 % — SIGNIFICANT CHANGE UP (ref 34.5–45)
HGB BLD-MCNC: 12.2 G/DL — SIGNIFICANT CHANGE UP (ref 11.5–15.5)
INR BLD: 0.92 RATIO — SIGNIFICANT CHANGE UP (ref 0.85–1.16)
MAGNESIUM SERPL-MCNC: 2.2 MG/DL — SIGNIFICANT CHANGE UP (ref 1.6–2.6)
MCHC RBC-ENTMCNC: 31.4 PG — SIGNIFICANT CHANGE UP (ref 27–34)
MCHC RBC-ENTMCNC: 32.1 G/DL — SIGNIFICANT CHANGE UP (ref 32–36)
MCV RBC AUTO: 97.9 FL — SIGNIFICANT CHANGE UP (ref 80–100)
NRBC # BLD: 0 /100 WBCS — SIGNIFICANT CHANGE UP (ref 0–0)
NRBC # FLD: 0 K/UL — SIGNIFICANT CHANGE UP (ref 0–0)
PHOSPHATE SERPL-MCNC: 3.6 MG/DL — SIGNIFICANT CHANGE UP (ref 2.5–4.5)
PLATELET # BLD AUTO: 342 K/UL — SIGNIFICANT CHANGE UP (ref 150–400)
POTASSIUM SERPL-MCNC: 3.9 MMOL/L — SIGNIFICANT CHANGE UP (ref 3.5–5.3)
POTASSIUM SERPL-SCNC: 3.9 MMOL/L — SIGNIFICANT CHANGE UP (ref 3.5–5.3)
PROT SERPL-MCNC: 7.1 G/DL — SIGNIFICANT CHANGE UP (ref 6–8.3)
PROTHROM AB SERPL-ACNC: 10.7 SEC — SIGNIFICANT CHANGE UP (ref 9.9–13.4)
RBC # BLD: 3.88 M/UL — SIGNIFICANT CHANGE UP (ref 3.8–5.2)
RBC # FLD: 12.9 % — SIGNIFICANT CHANGE UP (ref 10.3–14.5)
SODIUM SERPL-SCNC: 140 MMOL/L — SIGNIFICANT CHANGE UP (ref 135–145)
SPECIMEN SOURCE: SIGNIFICANT CHANGE UP
WBC # BLD: 7.47 K/UL — SIGNIFICANT CHANGE UP (ref 3.8–10.5)
WBC # FLD AUTO: 7.47 K/UL — SIGNIFICANT CHANGE UP (ref 3.8–10.5)

## 2024-12-13 PROCEDURE — 93010 ELECTROCARDIOGRAM REPORT: CPT

## 2024-12-13 PROCEDURE — 77012 CT SCAN FOR NEEDLE BIOPSY: CPT | Mod: 26

## 2024-12-13 PROCEDURE — 62267 INTERDISCAL PERQ ASPIR DX: CPT

## 2024-12-13 RX ORDER — CEFTRIAXONE SODIUM 1 G
2000 VIAL (EA) INJECTION EVERY 24 HOURS
Refills: 0 | Status: DISCONTINUED | OUTPATIENT
Start: 2024-12-13 | End: 2024-12-18

## 2024-12-13 RX ORDER — METOPROLOL TARTRATE 100 MG/1
25 TABLET, FILM COATED ORAL ONCE
Refills: 0 | Status: COMPLETED | OUTPATIENT
Start: 2024-12-13 | End: 2024-12-13

## 2024-12-13 RX ORDER — VANCOMYCIN HCL 900 MCG/MG
1000 POWDER (GRAM) MISCELLANEOUS EVERY 12 HOURS
Refills: 0 | Status: DISCONTINUED | OUTPATIENT
Start: 2024-12-13 | End: 2024-12-17

## 2024-12-13 RX ADMIN — Medication 5 MILLIGRAM(S): at 05:21

## 2024-12-13 RX ADMIN — METOPROLOL TARTRATE 25 MILLIGRAM(S): 100 TABLET, FILM COATED ORAL at 05:22

## 2024-12-13 RX ADMIN — Medication 100 MILLIGRAM(S): at 20:38

## 2024-12-13 RX ADMIN — FLUTICASONE PROPIONATE AND SALMETEROL XINAFOATE 1 DOSE(S): 45; 21 AEROSOL, METERED RESPIRATORY (INHALATION) at 21:20

## 2024-12-13 RX ADMIN — Medication 250 MILLIGRAM(S): at 21:19

## 2024-12-13 RX ADMIN — METOPROLOL TARTRATE 25 MILLIGRAM(S): 100 TABLET, FILM COATED ORAL at 22:30

## 2024-12-13 RX ADMIN — FLUTICASONE PROPIONATE AND SALMETEROL XINAFOATE 1 DOSE(S): 45; 21 AEROSOL, METERED RESPIRATORY (INHALATION) at 11:33

## 2024-12-13 NOTE — PROVIDER CONTACT NOTE (OTHER) - ASSESSMENT
Pt noted with -115 bmp. Asymptomatic.
Pt's , all other vss, no complaints of pain.
Pt tachy 107, other vss, asymptomatic
A0 x 4. Noted with o2 sat 87-89 % while sleeping. No s/sx of distress noted. Pt noted snoring.
Pt Hr tachycardic /62  other vital signs within normal limits. no s/s of acute/physical distress noted.

## 2024-12-13 NOTE — PROVIDER CONTACT NOTE (OTHER) - REASON
Pt HR trending tachycardic (115-130's)
Noted with o2 sat 87-89 % while sleeping
Pt noted with -115 bmp
Pt tachy 107
Pt's .

## 2024-12-13 NOTE — PROGRESS NOTE ADULT - SUBJECTIVE AND OBJECTIVE BOX
Patient is a 64y old  Female who presents with a chief complaint of epidural abscess concern (13 Dec 2024 05:17)      DATE OF SERVICE: 12-13-24 @ 10:10    SUBJECTIVE / OVERNIGHT EVENTS: overnight events noted    ROS:  Resp: No cough no sputum production  CVS: No chest pain no palpitations no orthopnea  GI: no N/V/D  : no dysuria, no hematuria  Neuro: no weakness no paresthesias        MEDICATIONS  (STANDING):  busPIRone 5 milliGRAM(s) Oral two times a day  fluticasone propionate/ salmeterol 100-50 MICROgram(s) Diskus 1 Dose(s) Inhalation two times a day  influenza  Vaccine (HIGH DOSE) 0.5 milliLiter(s) IntraMuscular once  metoprolol tartrate 25 milliGRAM(s) Oral two times a day  tiotropium 2.5 MICROgram(s) Inhaler 2 Puff(s) Inhalation daily    MEDICATIONS  (PRN):  acetaminophen     Tablet .. 650 milliGRAM(s) Oral every 6 hours PRN Temp greater or equal to 38C (100.4F), Mild Pain (1 - 3)  melatonin 3 milliGRAM(s) Oral at bedtime PRN Insomnia        CAPILLARY BLOOD GLUCOSE        I&O's Summary    12 Dec 2024 07:01  -  13 Dec 2024 07:00  --------------------------------------------------------  IN: 960 mL / OUT: 0 mL / NET: 960 mL        Vital Signs Last 24 Hrs  T(C): 36.4 (13 Dec 2024 09:44), Max: 36.7 (13 Dec 2024 05:00)  T(F): 97.5 (13 Dec 2024 09:44), Max: 98 (13 Dec 2024 05:00)  HR: 97 (13 Dec 2024 09:44) (89 - 105)  BP: 116/62 (13 Dec 2024 09:44) (100/59 - 130/71)  BP(mean): --  RR: 18 (13 Dec 2024 09:44) (17 - 18)  SpO2: 95% (13 Dec 2024 09:44) (95% - 100%)    PHYSICAL EXAM:  CHEST/LUNG: clear   HEART: S1 S2; no murmurs   ABDOMEN: Soft, Nontender  EXTREMITIES:    no edema  NEUROLOGY: AO x 3 non-focal  no LE weakness or sensory loss  SKIN: No rashes or lesions    LABS:                        12.2   7.47  )-----------( 342      ( 13 Dec 2024 05:33 )             38.0     12-13    140  |  102  |  18  ----------------------------<  89  3.9   |  23  |  0.90    Ca    9.5      13 Dec 2024 05:33  Phos  3.6     12-13  Mg     2.20     12-13    TPro  7.1  /  Alb  4.0  /  TBili  0.3  /  DBili  x   /  AST  13  /  ALT  12  /  AlkPhos  74  12-13    PT/INR - ( 13 Dec 2024 05:33 )   PT: 10.7 sec;   INR: 0.92 ratio         PTT - ( 13 Dec 2024 05:33 )  PTT:30.9 sec      Urinalysis Basic - ( 13 Dec 2024 05:33 )    Color: x / Appearance: x / SG: x / pH: x  Gluc: 89 mg/dL / Ketone: x  / Bili: x / Urobili: x   Blood: x / Protein: x / Nitrite: x   Leuk Esterase: x / RBC: x / WBC x   Sq Epi: x / Non Sq Epi: x / Bacteria: x          All consultant(s) notes reviewed and care discussed with other providers        Contact Number, Dr Allen [phone]

## 2024-12-13 NOTE — PRE PROCEDURE NOTE - PRE PROCEDURE EVALUATION
Interventional Radiology    HPI: 64F w/ hx of Lung CA s/p resection, HTN, anxiety, p/w concern for osteomyelitis and epidural abscess. IR consulted for aspiration.     Allergies: No Known Allergies    Medications (Abx/Cardiac/Anticoagulation/Blood Products)    metoprolol tartrate: 25 milliGRAM(s) Oral (12-13 @ 05:22)    Data:    T(C): 36.6  HR: 106  BP: 105/71  RR: 18  SpO2: 100%    Exam  General: No acute distress  Chest: Non labored breathing    -WBC 7.47 / HgB 12.2 / Hct 38.0 / Plt 342  -Na 140 / Cl 102 / BUN 18 / Glucose 89  -K 3.9 / CO2 23 / Cr 0.90  -ALT 12 / Alk Phos 74 / T.Bili 0.3  -INR0.92    Imaging: Reviewed    Plan: 64y Female presents for L5-S1 aspiration.   -Risks/Benefits/alternatives explained with the patient and/or healthcare proxy and witnessed informed consent obtained.

## 2024-12-13 NOTE — PROVIDER CONTACT NOTE (OTHER) - SITUATION
Pt noted with -115 bmp
Pt tachy 107
Pt HR trending tachycardic (115-130's)
Pt's .
Noted with o2 sat 87-89 % while sleeping

## 2024-12-13 NOTE — PROGRESS NOTE ADULT - SUBJECTIVE AND OBJECTIVE BOX
Orthopedic Surgery Progress Note     S:  Patient seen and examined today.  Does not endorse pain. Ambulated in the hallway yesterday, Denies f/c, chest pain, shortness of breath, dizziness.      Physical Exam:  Gen: NAD  Resp: Symmetric chest rise, No increased WOB      Spine:    Skin intact w/o ecchymosis or erythema   Motor exam:          Upper extremity         C5 (Shoulder Abd)    C6 (Elbow flex)   C7 (Elbow ext)   C8 (Finger flex) T1 (finger abd)         R         5/5                 5/5                       5/5                      5/5                         5/5          L          5/5                 5/5                      5/5                      5/5                         5/5                   Lower extremity           L2 (Hip flex)  L3 (knee ext)  L4 (Dorsi flex)  L5 (EHL)  S1 (Plantar flex)                                               R        5/5            5/5             5/5                    5/5          5/5      L        5/5            5/5             5/5                   5/5           5/5      Sensory exam:                        C5      C6      C7      C8       T1          RIGHT          2         2        2         2         2          (0=absent, 1=impaired, 2=normal, NT=not testable)  LEFT             2         2        2         2         2          (0=absent, 1=impaired, 2=normal, NT=not testable)                          L2      L3     L4     L5       S1          RIGHT          2         2        2         2         2          (0=absent, 1=impaired, 2=normal, NT=not testable)  LEFT             2         2        2         2         2          (0=absent, 1=impaired, 2=normal, NT=not testable)                                                 BLE: WWP, compartments soft and compressible            Vital Signs Last 24 Hrs  T(C): 36.5 (13 Dec 2024 02:00), Max: 36.7 (12 Dec 2024 10:00)  T(F): 97.7 (13 Dec 2024 02:00), Max: 98.1 (12 Dec 2024 10:00)  HR: 105 (13 Dec 2024 02:00) (89 - 105)  BP: 130/71 (13 Dec 2024 02:00) (100/59 - 130/71)  BP(mean): --  RR: 18 (13 Dec 2024 02:00) (17 - 19)  SpO2: 98% (13 Dec 2024 02:00) (96% - 100%)    Parameters below as of 13 Dec 2024 02:00  Patient On (Oxygen Delivery Method): room air        12-11-24 @ 07:01 - 12-12-24 @ 07:00  --------------------------------------------------------  IN: 1520 mL / OUT: 0 mL / NET: 1520 mL    12-12-24 @ 07:01  -  12-13-24 @ 05:17  --------------------------------------------------------  IN: 960 mL / OUT: 0 mL / NET: 960 mL        LABS:                        12.3   8.56  )-----------( 360      ( 12 Dec 2024 06:41 )             35.9     12-12    138  |  102  |  16  ----------------------------<  100[H]  3.9   |  24  |  0.84    Ca    9.7      12 Dec 2024 06:41    TPro  7.1  /  Alb  4.1  /  TBili  0.2  /  DBili  x   /  AST  14  /  ALT  10  /  AlkPhos  77  12-12      PAST MEDICAL & SURGICAL Hx  PAST MEDICAL & SURGICAL HISTORY:  Osteoporosis      History of COPD      Other nonspecific abnormal finding of lung field      Lung cancer      H/O ovarian cystectomy      History of tonsillectomy          MEDICATIONS  (STANDING):  busPIRone 5 milliGRAM(s) Oral two times a day  fluticasone propionate/ salmeterol 100-50 MICROgram(s) Diskus 1 Dose(s) Inhalation two times a day  influenza  Vaccine (HIGH DOSE) 0.5 milliLiter(s) IntraMuscular once  metoprolol tartrate 25 milliGRAM(s) Oral two times a day  tiotropium 2.5 MICROgram(s) Inhaler 2 Puff(s) Inhalation daily    MEDICATIONS  (PRN):  acetaminophen     Tablet .. 650 milliGRAM(s) Oral every 6 hours PRN Temp greater or equal to 38C (100.4F), Mild Pain (1 - 3)  melatonin 3 milliGRAM(s) Oral at bedtime PRN Insomnia        A: 64yFemale w/ L5-S1 R facet septic arthritis, osteomyelitis and small epidural abscess spanning L5-S3. She has no neurodeficits.     Plan:   - IR CT guided biopsy of R L5/S1 fecet joint aspiration on 12/13  - 12/11 CT w/ evidence Extension into the region of the right neural foramen is seen more significant compared with the prior CT.   - WBAT   - Follow up 12/8 blood cultures- NGTD   - Appreciate Abx per medicine/ID (recieved CTX and Vanc on 12/8)       - OK to resume Vancomycin and Ceftriaxone 2 gms Q12 post IR procedure  - Ortho to follow        For all questions related to patient care, please reach out via the on-call pager.*     Kortney Dupont, PGY1  Orthopedic Surgery  [facility]: [phone]  [facility]: [id number]  [facility]: [id number]

## 2024-12-13 NOTE — PROVIDER CONTACT NOTE (OTHER) - BACKGROUND
Admitted for osteomyelitis of spine, epidural abscess
Adm osteomyelitis
Admitted for osteomyelitis and epidural abscess/
Admitted for OM of spine
Admitted for OM of spine

## 2024-12-14 LAB
ALBUMIN SERPL ELPH-MCNC: 3.9 G/DL — SIGNIFICANT CHANGE UP (ref 3.3–5)
ALP SERPL-CCNC: 71 U/L — SIGNIFICANT CHANGE UP (ref 40–120)
ALT FLD-CCNC: 12 U/L — SIGNIFICANT CHANGE UP (ref 4–33)
ANION GAP SERPL CALC-SCNC: 14 MMOL/L — SIGNIFICANT CHANGE UP (ref 7–14)
AST SERPL-CCNC: 15 U/L — SIGNIFICANT CHANGE UP (ref 4–32)
BASOPHILS # BLD AUTO: 0.07 K/UL — SIGNIFICANT CHANGE UP (ref 0–0.2)
BASOPHILS NFR BLD AUTO: 1 % — SIGNIFICANT CHANGE UP (ref 0–2)
BILIRUB SERPL-MCNC: 0.3 MG/DL — SIGNIFICANT CHANGE UP (ref 0.2–1.2)
BUN SERPL-MCNC: 24 MG/DL — HIGH (ref 7–23)
CALCIUM SERPL-MCNC: 9.5 MG/DL — SIGNIFICANT CHANGE UP (ref 8.4–10.5)
CHLORIDE SERPL-SCNC: 103 MMOL/L — SIGNIFICANT CHANGE UP (ref 98–107)
CO2 SERPL-SCNC: 23 MMOL/L — SIGNIFICANT CHANGE UP (ref 22–31)
CREAT SERPL-MCNC: 0.93 MG/DL — SIGNIFICANT CHANGE UP (ref 0.5–1.3)
EGFR: 68 ML/MIN/1.73M2 — SIGNIFICANT CHANGE UP
EOSINOPHIL # BLD AUTO: 0.31 K/UL — SIGNIFICANT CHANGE UP (ref 0–0.5)
EOSINOPHIL NFR BLD AUTO: 4.5 % — SIGNIFICANT CHANGE UP (ref 0–6)
GLUCOSE SERPL-MCNC: 87 MG/DL — SIGNIFICANT CHANGE UP (ref 70–99)
HCT VFR BLD CALC: 36.2 % — SIGNIFICANT CHANGE UP (ref 34.5–45)
HGB BLD-MCNC: 12.2 G/DL — SIGNIFICANT CHANGE UP (ref 11.5–15.5)
IANC: 4.35 K/UL — SIGNIFICANT CHANGE UP (ref 1.8–7.4)
IMM GRANULOCYTES NFR BLD AUTO: 1.2 % — HIGH (ref 0–0.9)
LYMPHOCYTES # BLD AUTO: 1.23 K/UL — SIGNIFICANT CHANGE UP (ref 1–3.3)
LYMPHOCYTES # BLD AUTO: 17.7 % — SIGNIFICANT CHANGE UP (ref 13–44)
MCHC RBC-ENTMCNC: 32.1 PG — SIGNIFICANT CHANGE UP (ref 27–34)
MCHC RBC-ENTMCNC: 33.7 G/DL — SIGNIFICANT CHANGE UP (ref 32–36)
MCV RBC AUTO: 95.3 FL — SIGNIFICANT CHANGE UP (ref 80–100)
MONOCYTES # BLD AUTO: 0.89 K/UL — SIGNIFICANT CHANGE UP (ref 0–0.9)
MONOCYTES NFR BLD AUTO: 12.8 % — SIGNIFICANT CHANGE UP (ref 2–14)
NEUTROPHILS # BLD AUTO: 4.35 K/UL — SIGNIFICANT CHANGE UP (ref 1.8–7.4)
NEUTROPHILS NFR BLD AUTO: 62.8 % — SIGNIFICANT CHANGE UP (ref 43–77)
NIGHT BLUE STAIN TISS: SIGNIFICANT CHANGE UP
NRBC # BLD: 0 /100 WBCS — SIGNIFICANT CHANGE UP (ref 0–0)
NRBC # FLD: 0 K/UL — SIGNIFICANT CHANGE UP (ref 0–0)
PLATELET # BLD AUTO: 335 K/UL — SIGNIFICANT CHANGE UP (ref 150–400)
POTASSIUM SERPL-MCNC: 4 MMOL/L — SIGNIFICANT CHANGE UP (ref 3.5–5.3)
POTASSIUM SERPL-SCNC: 4 MMOL/L — SIGNIFICANT CHANGE UP (ref 3.5–5.3)
PROT SERPL-MCNC: 7.2 G/DL — SIGNIFICANT CHANGE UP (ref 6–8.3)
RBC # BLD: 3.8 M/UL — SIGNIFICANT CHANGE UP (ref 3.8–5.2)
RBC # FLD: 12.8 % — SIGNIFICANT CHANGE UP (ref 10.3–14.5)
SODIUM SERPL-SCNC: 140 MMOL/L — SIGNIFICANT CHANGE UP (ref 135–145)
SPECIMEN SOURCE: SIGNIFICANT CHANGE UP
WBC # BLD: 6.93 K/UL — SIGNIFICANT CHANGE UP (ref 3.8–10.5)
WBC # FLD AUTO: 6.93 K/UL — SIGNIFICANT CHANGE UP (ref 3.8–10.5)

## 2024-12-14 PROCEDURE — 99232 SBSQ HOSP IP/OBS MODERATE 35: CPT

## 2024-12-14 RX ORDER — ENOXAPARIN SODIUM 30 MG/.3ML
40 INJECTION SUBCUTANEOUS EVERY 24 HOURS
Refills: 0 | Status: DISCONTINUED | OUTPATIENT
Start: 2024-12-14 | End: 2024-12-16

## 2024-12-14 RX ADMIN — METOPROLOL TARTRATE 25 MILLIGRAM(S): 100 TABLET, FILM COATED ORAL at 05:05

## 2024-12-14 RX ADMIN — Medication 250 MILLIGRAM(S): at 12:44

## 2024-12-14 RX ADMIN — FLUTICASONE PROPIONATE AND SALMETEROL XINAFOATE 1 DOSE(S): 45; 21 AEROSOL, METERED RESPIRATORY (INHALATION) at 12:45

## 2024-12-14 RX ADMIN — FLUTICASONE PROPIONATE AND SALMETEROL XINAFOATE 1 DOSE(S): 45; 21 AEROSOL, METERED RESPIRATORY (INHALATION) at 21:34

## 2024-12-14 RX ADMIN — Medication 5 MILLIGRAM(S): at 19:29

## 2024-12-14 RX ADMIN — Medication 2 PUFF(S): at 19:30

## 2024-12-14 RX ADMIN — Medication 5 MILLIGRAM(S): at 05:05

## 2024-12-14 RX ADMIN — METOPROLOL TARTRATE 25 MILLIGRAM(S): 100 TABLET, FILM COATED ORAL at 19:29

## 2024-12-14 RX ADMIN — Medication 100 MILLIGRAM(S): at 19:31

## 2024-12-14 NOTE — PROGRESS NOTE ADULT - SUBJECTIVE AND OBJECTIVE BOX
ANESTHESIA POSTOP CHECK    64y Female POSTOP DAY 1      Vital Signs Last 24 Hrs  T(C): 36.4 (14 Dec 2024 11:03), Max: 36.8 (13 Dec 2024 18:10)  T(F): 97.5 (14 Dec 2024 11:03), Max: 98.2 (13 Dec 2024 18:10)  HR: 110 (14 Dec 2024 11:03) (93 - 131)  BP: 100/- (14 Dec 2024 11:03) (100/- - 141/86)  RR: 18 (14 Dec 2024 11:03) (14 - 20)  SpO2: 98% (14 Dec 2024 11:03) (97% - 100%)    Parameters below as of 14 Dec 2024 11:03  Patient On (Oxygen Delivery Method): room air          [x ] NO APPARENT ANESTHESIA COMPLICATIONS

## 2024-12-14 NOTE — PROGRESS NOTE ADULT - SUBJECTIVE AND OBJECTIVE BOX
Patient is a 64y old  Female who presents with a chief complaint of epidural abscess concern (14 Dec 2024 11:50)      DATE OF SERVICE: 12-14-24 @ 12:27    SUBJECTIVE / OVERNIGHT EVENTS: overnight events noted    ROS:  Resp: No cough no sputum production  CVS: No chest pain no palpitations no orthopnea  GI: no N/V/D  'I feel fine'       MEDICATIONS  (STANDING):  busPIRone 5 milliGRAM(s) Oral two times a day  cefTRIAXone   IVPB 2000 milliGRAM(s) IV Intermittent every 24 hours  fluticasone propionate/ salmeterol 100-50 MICROgram(s) Diskus 1 Dose(s) Inhalation two times a day  influenza  Vaccine (HIGH DOSE) 0.5 milliLiter(s) IntraMuscular once  metoprolol tartrate 25 milliGRAM(s) Oral two times a day  tiotropium 2.5 MICROgram(s) Inhaler 2 Puff(s) Inhalation daily  vancomycin  IVPB 1000 milliGRAM(s) IV Intermittent every 12 hours    MEDICATIONS  (PRN):  acetaminophen     Tablet .. 650 milliGRAM(s) Oral every 6 hours PRN Temp greater or equal to 38C (100.4F), Mild Pain (1 - 3)  melatonin 3 milliGRAM(s) Oral at bedtime PRN Insomnia        CAPILLARY BLOOD GLUCOSE        I&O's Summary    13 Dec 2024 07:01  -  14 Dec 2024 07:00  --------------------------------------------------------  IN: 240 mL / OUT: 0 mL / NET: 240 mL        Vital Signs Last 24 Hrs  T(C): 36.4 (14 Dec 2024 11:03), Max: 36.8 (13 Dec 2024 18:10)  T(F): 97.5 (14 Dec 2024 11:03), Max: 98.2 (13 Dec 2024 18:10)  HR: 110 (14 Dec 2024 11:03) (93 - 131)  BP: 100/- (14 Dec 2024 11:03) (100/- - 141/86)  BP(mean): --  RR: 18 (14 Dec 2024 11:03) (14 - 20)  SpO2: 98% (14 Dec 2024 11:03) (97% - 100%)    PHYSICAL EXAM:  CHEST/LUNG: clear   HEART: S1 S2; no murmurs   ABDOMEN: Soft, Nontender  EXTREMITIES:    no edema  NEUROLOGY: AO x 3 non-focal  no LE weakness or sensory loss      LABS:                        12.2   6.93  )-----------( 335      ( 14 Dec 2024 07:20 )             36.2     12-14    140  |  103  |  24[H]  ----------------------------<  87  4.0   |  23  |  0.93    Ca    9.5      14 Dec 2024 07:20  Phos  3.6     12-13  Mg     2.20     12-13    TPro  7.2  /  Alb  3.9  /  TBili  0.3  /  DBili  x   /  AST  15  /  ALT  12  /  AlkPhos  71  12-14    PT/INR - ( 13 Dec 2024 05:33 )   PT: 10.7 sec;   INR: 0.92 ratio         PTT - ( 13 Dec 2024 05:33 )  PTT:30.9 sec      Urinalysis Basic - ( 14 Dec 2024 07:20 )    Color: x / Appearance: x / SG: x / pH: x  Gluc: 87 mg/dL / Ketone: x  / Bili: x / Urobili: x   Blood: x / Protein: x / Nitrite: x   Leuk Esterase: x / RBC: x / WBC x   Sq Epi: x / Non Sq Epi: x / Bacteria: x          All consultant(s) notes reviewed and care discussed with other providers        Contact Number, Dr Allen [phone]

## 2024-12-14 NOTE — PROGRESS NOTE ADULT - ASSESSMENT
64F w/ hx of Lung CA s/p resection, HTN, anxiety, p/w concern for osteomyelitis and epidural abscess after repeat imaging with outpatient ortho

## 2024-12-14 NOTE — PROGRESS NOTE ADULT - SUBJECTIVE AND OBJECTIVE BOX
Follow Up:      Inverval History/ROS:Patient is a 64y old  Female who presents with a chief complaint of epidural abscess concern (13 Dec 2024 10:09)    No fever  No events  Less pain    Allergies    No Known Allergies    Intolerances        ANTIMICROBIALS:  cefTRIAXone   IVPB 2000 every 24 hours  vancomycin  IVPB 1000 every 12 hours      OTHER MEDS:  acetaminophen     Tablet .. 650 milliGRAM(s) Oral every 6 hours PRN  busPIRone 5 milliGRAM(s) Oral two times a day  fluticasone propionate/ salmeterol 100-50 MICROgram(s) Diskus 1 Dose(s) Inhalation two times a day  influenza  Vaccine (HIGH DOSE) 0.5 milliLiter(s) IntraMuscular once  melatonin 3 milliGRAM(s) Oral at bedtime PRN  metoprolol tartrate 25 milliGRAM(s) Oral two times a day  tiotropium 2.5 MICROgram(s) Inhaler 2 Puff(s) Inhalation daily      Vital Signs Last 24 Hrs  T(C): 36.4 (14 Dec 2024 11:03), Max: 36.8 (13 Dec 2024 18:10)  T(F): 97.5 (14 Dec 2024 11:03), Max: 98.2 (13 Dec 2024 18:10)  HR: 110 (14 Dec 2024 11:03) (93 - 131)  BP: 100/- (14 Dec 2024 11:03) (100/- - 141/86)  BP(mean): --  RR: 18 (14 Dec 2024 11:03) (14 - 20)  SpO2: 98% (14 Dec 2024 11:03) (97% - 100%)    Parameters below as of 14 Dec 2024 11:03  Patient On (Oxygen Delivery Method): room air        PHYSICAL EXAM:  General: [ ] non-toxic  HEAD/EYES: [ ] PERRL [x ] white sclera [ ] icterus  ENT:  [ ] normal [ ]x supple [ ] thrush [ ] pharyngeal exudate  Cardiovascular:   [ ] murmur [x ] normal [ ] PPM/AICD  Respiratory:  [ x] clear to ausculation bilaterally  GI:  [x ] soft, non-tender, normal bowel sounds  :  [ ] angelo [ ] no CVA tenderness   Musculoskeletal:  [ ] no synovitis  Neurologic:  [ ] non-focal exam   Skin:  [x ] no rash  Lymph: [ x] no lymphadenopathy  Psychiatric:  [ ] appropriate affect [ ] alert & oriented  Lines:  [ x] no phlebitis [ ] central line                                12.2   6.93  )-----------( 335      ( 14 Dec 2024 07:20 )             36.2       12-14    140  |  103  |  24[H]  ----------------------------<  87  4.0   |  23  |  0.93    Ca    9.5      14 Dec 2024 07:20  Phos  3.6     12-13  Mg     2.20     12-13    TPro  7.2  /  Alb  3.9  /  TBili  0.3  /  DBili  x   /  AST  15  /  ALT  12  /  AlkPhos  71  12-14      Urinalysis Basic - ( 14 Dec 2024 07:20 )    Color: x / Appearance: x / SG: x / pH: x  Gluc: 87 mg/dL / Ketone: x  / Bili: x / Urobili: x   Blood: x / Protein: x / Nitrite: x   Leuk Esterase: x / RBC: x / WBC x   Sq Epi: x / Non Sq Epi: x / Bacteria: x        MICROBIOLOGY:Culture Results:   Testing in progress (12-13-24 @ 17:55)  Culture Results:   No growth at 5 days (12-08-24 @ 11:20)  Culture Results:   No growth at 5 days (12-08-24 @ 10:25)      RADIOLOGY:

## 2024-12-14 NOTE — PROGRESS NOTE ADULT - ASSESSMENT
64F w/ hx of Lung CA s/p resection, HTN, anxiety, p/w concern for epidural infection after repeat imaging w/ Dr. Merino. Concern for infection began in October when MRI showed ambiguous findings of infection at L5-S1. Repeat imaging this week revealed progression of infection so she was sent in for infectious workup. She has had RLE radiculopathy for several years. She has not noticed her symptoms to have gotten any worse recently. She has been able to walk without difficulty. Denies focal weakness, numbness/tingling, or radicular sxs. Denies fevers/chills, acute changes in bowel/bladder function, or saddle anesthesia. Denies recent falls/trauma or any other ortho injuries. Community ambulator w/o assistive devices at baseline. She does report recent infection of UTI which was treated with PO abx. Patient otherwise denies fevers chills or back pain. Still has sciatica but is tolerable. Denies any procedures for past several months.    ER/hospital course: afebrile, no leucocytosis    Blood cx obtained    No prior significant micro data on chart review    ESR 46, CRP 9      # Concern for Septic arthritis/ OM/ Epidural abscess  # Elevated inflammatory markers  # Abnormal MRI      - afebrile, no leucocytosis, no systemic signs, no FND  - blood cx NTD   - repeat MRI noted.   - S/p Ir aspirate- follow up cultures  - Continue Vancomycin and Ceftriaxone post IR procedure  - Check vanco through before 4th dose

## 2024-12-15 LAB
BASOPHILS # BLD AUTO: 0.05 K/UL — SIGNIFICANT CHANGE UP (ref 0–0.2)
BASOPHILS NFR BLD AUTO: 0.8 % — SIGNIFICANT CHANGE UP (ref 0–2)
EOSINOPHIL # BLD AUTO: 0.28 K/UL — SIGNIFICANT CHANGE UP (ref 0–0.5)
EOSINOPHIL NFR BLD AUTO: 4.5 % — SIGNIFICANT CHANGE UP (ref 0–6)
HCT VFR BLD CALC: 34.4 % — LOW (ref 34.5–45)
HGB BLD-MCNC: 11.6 G/DL — SIGNIFICANT CHANGE UP (ref 11.5–15.5)
IANC: 3.54 K/UL — SIGNIFICANT CHANGE UP (ref 1.8–7.4)
IMM GRANULOCYTES NFR BLD AUTO: 0.5 % — SIGNIFICANT CHANGE UP (ref 0–0.9)
LYMPHOCYTES # BLD AUTO: 1.54 K/UL — SIGNIFICANT CHANGE UP (ref 1–3.3)
LYMPHOCYTES # BLD AUTO: 24.9 % — SIGNIFICANT CHANGE UP (ref 13–44)
MCHC RBC-ENTMCNC: 31.7 PG — SIGNIFICANT CHANGE UP (ref 27–34)
MCHC RBC-ENTMCNC: 33.7 G/DL — SIGNIFICANT CHANGE UP (ref 32–36)
MCV RBC AUTO: 94 FL — SIGNIFICANT CHANGE UP (ref 80–100)
MONOCYTES # BLD AUTO: 0.74 K/UL — SIGNIFICANT CHANGE UP (ref 0–0.9)
MONOCYTES NFR BLD AUTO: 12 % — SIGNIFICANT CHANGE UP (ref 2–14)
NEUTROPHILS # BLD AUTO: 3.54 K/UL — SIGNIFICANT CHANGE UP (ref 1.8–7.4)
NEUTROPHILS NFR BLD AUTO: 57.3 % — SIGNIFICANT CHANGE UP (ref 43–77)
NRBC # BLD: 0 /100 WBCS — SIGNIFICANT CHANGE UP (ref 0–0)
NRBC # FLD: 0 K/UL — SIGNIFICANT CHANGE UP (ref 0–0)
PLATELET # BLD AUTO: 350 K/UL — SIGNIFICANT CHANGE UP (ref 150–400)
RBC # BLD: 3.66 M/UL — LOW (ref 3.8–5.2)
RBC # FLD: 12.6 % — SIGNIFICANT CHANGE UP (ref 10.3–14.5)
VANCOMYCIN TROUGH SERPL-MCNC: 14 UG/ML — SIGNIFICANT CHANGE UP (ref 10–20)
VANCOMYCIN TROUGH SERPL-MCNC: 18.8 UG/ML — SIGNIFICANT CHANGE UP (ref 10–20)
WBC # BLD: 6.18 K/UL — SIGNIFICANT CHANGE UP (ref 3.8–10.5)
WBC # FLD AUTO: 6.18 K/UL — SIGNIFICANT CHANGE UP (ref 3.8–10.5)

## 2024-12-15 RX ADMIN — Medication 100 MILLIGRAM(S): at 19:45

## 2024-12-15 RX ADMIN — Medication 2 PUFF(S): at 18:38

## 2024-12-15 RX ADMIN — METOPROLOL TARTRATE 25 MILLIGRAM(S): 100 TABLET, FILM COATED ORAL at 18:37

## 2024-12-15 RX ADMIN — Medication 250 MILLIGRAM(S): at 12:48

## 2024-12-15 RX ADMIN — FLUTICASONE PROPIONATE AND SALMETEROL XINAFOATE 1 DOSE(S): 45; 21 AEROSOL, METERED RESPIRATORY (INHALATION) at 11:13

## 2024-12-15 RX ADMIN — Medication 5 MILLIGRAM(S): at 06:06

## 2024-12-15 RX ADMIN — Medication 250 MILLIGRAM(S): at 00:00

## 2024-12-15 RX ADMIN — Medication 5 MILLIGRAM(S): at 18:37

## 2024-12-15 RX ADMIN — METOPROLOL TARTRATE 25 MILLIGRAM(S): 100 TABLET, FILM COATED ORAL at 06:05

## 2024-12-15 NOTE — DIETITIAN INITIAL EVALUATION ADULT - OTHER INFO
Per chart, patient is a 64y Female with PMH HTN, lung cancer status post resection in remission x1 year, anxiety, osteoporosis who presented to [facility] for L5-S1 R facet septic arthritis, osteomyelitis, and small epidural abscess spanning L5-S3 status post Right L5/S1 facet joint aspiration/biopsy on 12/13/24.    Patient is currently ordered for a PO diet. Patient reports a good appetite and PO intake at meals during course of admission. Documented on RN flowsheet as consuming % of meals. Patient denies any chewing or swallowing difficulty with current diet order. No report of GI distress (nausea, vomiting, diarrhea, constipation). Last BM 12/8/24? per RN flowsheet documentation. Not noted to be on a bowel regimen.     Writer provided verbal education regarding current diet order and nutrition recommendations for after discharge. Patient verbalized understanding to the discussion.

## 2024-12-15 NOTE — DIETITIAN INITIAL EVALUATION ADULT - PERTINENT MEDS FT
MEDICATIONS  (STANDING):  busPIRone 5 milliGRAM(s) Oral two times a day  cefTRIAXone   IVPB 2000 milliGRAM(s) IV Intermittent every 24 hours  enoxaparin Injectable 40 milliGRAM(s) SubCutaneous every 24 hours  fluticasone propionate/ salmeterol 100-50 MICROgram(s) Diskus 1 Dose(s) Inhalation two times a day  influenza  Vaccine (HIGH DOSE) 0.5 milliLiter(s) IntraMuscular once  metoprolol tartrate 25 milliGRAM(s) Oral two times a day  tiotropium 2.5 MICROgram(s) Inhaler 2 Puff(s) Inhalation daily  vancomycin  IVPB 1000 milliGRAM(s) IV Intermittent every 12 hours    MEDICATIONS  (PRN):  acetaminophen     Tablet .. 650 milliGRAM(s) Oral every 6 hours PRN Temp greater or equal to 38C (100.4F), Mild Pain (1 - 3)  melatonin 3 milliGRAM(s) Oral at bedtime PRN Insomnia

## 2024-12-15 NOTE — DIETITIAN INITIAL EVALUATION ADULT - ORAL INTAKE PTA/DIET HISTORY
Patient reports no known food allergies or food intolerances. Nutrition supplementation at home includes a multivitamin, vitamin D, and calcium. Patient reports she maintained a good appetite prior to admission and consumed a varied diet including fruits, vegetables, whole grains, and lean proteins. Reports exercising portion control at meals in her efforts for intentional weight loss (more information below).

## 2024-12-15 NOTE — DIETITIAN INITIAL EVALUATION ADULT - OTHER CALCULATIONS
IBW: 110 lb +/- 10%, %%  Per Mena ACEVEDO, noted the following weight history: 73.9kg (12/12), 81.6kg (10/12)  Objective weight measurements suggest 7.7kg (9%) weight loss x2 months (significant, reported intentional weight loss per patient)  Patient reports usual body weight 170lb in August 2024, current weight as 163lb. Endorses intentional weight loss as she gained 50lb some time ago, and now is attempting to lose the weight.

## 2024-12-15 NOTE — PROGRESS NOTE ADULT - SUBJECTIVE AND OBJECTIVE BOX
Patient is a 64y old  Female who presents with a chief complaint of Osteomyelitis of spine     (15 Dec 2024 10:55)      DATE OF SERVICE: 12-15-24 @ 12:05    SUBJECTIVE / OVERNIGHT EVENTS: overnight events noted    ROS:  Resp: No cough no sputum production  CVS: No chest pain no palpitations no orthopnea  GI: no N/V/D  'I feel fine'         MEDICATIONS  (STANDING):  busPIRone 5 milliGRAM(s) Oral two times a day  cefTRIAXone   IVPB 2000 milliGRAM(s) IV Intermittent every 24 hours  enoxaparin Injectable 40 milliGRAM(s) SubCutaneous every 24 hours  fluticasone propionate/ salmeterol 100-50 MICROgram(s) Diskus 1 Dose(s) Inhalation two times a day  influenza  Vaccine (HIGH DOSE) 0.5 milliLiter(s) IntraMuscular once  metoprolol tartrate 25 milliGRAM(s) Oral two times a day  tiotropium 2.5 MICROgram(s) Inhaler 2 Puff(s) Inhalation daily  vancomycin  IVPB 1000 milliGRAM(s) IV Intermittent every 12 hours    MEDICATIONS  (PRN):  acetaminophen     Tablet .. 650 milliGRAM(s) Oral every 6 hours PRN Temp greater or equal to 38C (100.4F), Mild Pain (1 - 3)  melatonin 3 milliGRAM(s) Oral at bedtime PRN Insomnia        CAPILLARY BLOOD GLUCOSE        I&O's Summary    14 Dec 2024 07:01  -  15 Dec 2024 07:00  --------------------------------------------------------  IN: 1570 mL / OUT: 0 mL / NET: 1570 mL        Vital Signs Last 24 Hrs  T(C): 36.4 (15 Dec 2024 09:31), Max: 36.7 (14 Dec 2024 14:21)  T(F): 97.5 (15 Dec 2024 09:31), Max: 98.1 (14 Dec 2024 14:21)  HR: 102 (15 Dec 2024 09:31) (90 - 109)  BP: 102/69 (15 Dec 2024 09:31) (101/65 - 125/67)  BP(mean): --  RR: 16 (15 Dec 2024 09:31) (16 - 18)  SpO2: 100% (15 Dec 2024 09:31) (99% - 100%)      PHYSICAL EXAM:  CHEST/LUNG: clear   HEART: S1 S2; no murmurs   ABDOMEN: Soft, Nontender  EXTREMITIES:    no edema  NEUROLOGY: AO x 3 non-focal  no LE weakness or sensory loss    LABS:                        11.6   6.18  )-----------( 350      ( 15 Dec 2024 05:49 )             34.4     12-14    140  |  103  |  24[H]  ----------------------------<  87  4.0   |  23  |  0.93    Ca    9.5      14 Dec 2024 07:20    TPro  7.2  /  Alb  3.9  /  TBili  0.3  /  DBili  x   /  AST  15  /  ALT  12  /  AlkPhos  71  12-14          Urinalysis Basic - ( 14 Dec 2024 07:20 )    Color: x / Appearance: x / SG: x / pH: x  Gluc: 87 mg/dL / Ketone: x  / Bili: x / Urobili: x   Blood: x / Protein: x / Nitrite: x   Leuk Esterase: x / RBC: x / WBC x   Sq Epi: x / Non Sq Epi: x / Bacteria: x          All consultant(s) notes reviewed and care discussed with other providers        Contact Number, Dr Allen [phone]

## 2024-12-15 NOTE — PROVIDER CONTACT NOTE (CRITICAL VALUE NOTIFICATION) - ACTION/TREATMENT ORDERED:
Provider aware, no further orders at this time. Blood bank notified, blood bank does not have blood for patient.

## 2024-12-15 NOTE — DIETITIAN INITIAL EVALUATION ADULT - PERTINENT LABORATORY DATA
12-14    140  |  103  |  24[H]  ----------------------------<  87  4.0   |  23  |  0.93    Ca    9.5      14 Dec 2024 07:20    TPro  7.2  /  Alb  3.9  /  TBili  0.3  /  DBili  x   /  AST  15  /  ALT  12  /  AlkPhos  71  12-14

## 2024-12-16 LAB — VANCOMYCIN TROUGH SERPL-MCNC: 21 UG/ML — HIGH (ref 10–20)

## 2024-12-16 PROCEDURE — 99232 SBSQ HOSP IP/OBS MODERATE 35: CPT

## 2024-12-16 RX ADMIN — Medication 5 MILLIGRAM(S): at 05:13

## 2024-12-16 RX ADMIN — METOPROLOL TARTRATE 25 MILLIGRAM(S): 100 TABLET, FILM COATED ORAL at 05:13

## 2024-12-16 RX ADMIN — Medication 5 MILLIGRAM(S): at 18:16

## 2024-12-16 RX ADMIN — Medication 250 MILLIGRAM(S): at 12:06

## 2024-12-16 RX ADMIN — Medication 250 MILLIGRAM(S): at 00:14

## 2024-12-16 RX ADMIN — FLUTICASONE PROPIONATE AND SALMETEROL XINAFOATE 1 DOSE(S): 45; 21 AEROSOL, METERED RESPIRATORY (INHALATION) at 12:09

## 2024-12-16 RX ADMIN — FLUTICASONE PROPIONATE AND SALMETEROL XINAFOATE 1 DOSE(S): 45; 21 AEROSOL, METERED RESPIRATORY (INHALATION) at 21:30

## 2024-12-16 RX ADMIN — Medication 2 PUFF(S): at 18:16

## 2024-12-16 RX ADMIN — Medication 100 MILLIGRAM(S): at 21:30

## 2024-12-16 NOTE — PROGRESS NOTE ADULT - ASSESSMENT
64F w/ hx of Lung CA s/p resection, HTN, anxiety, p/w concern for epidural infection after repeat imaging w/ Dr. Merino. Concern for infection began in October when MRI showed ambiguous findings of infection at L5-S1. Repeat imaging this week revealed progression of infection so she was sent in for infectious workup. She has had RLE radiculopathy for several years. She has not noticed her symptoms to have gotten any worse recently. She has been able to walk without difficulty. Denies focal weakness, numbness/tingling, or radicular sxs. Denies fevers/chills, acute changes in bowel/bladder function, or saddle anesthesia. Denies recent falls/trauma or any other ortho injuries. Community ambulator w/o assistive devices at baseline. She does report recent infection of UTI which was treated with PO abx. Patient otherwise denies fevers chills or back pain. Still has sciatica but is tolerable. Denies any procedures for past several months.    ER/hospital course: afebrile, no leucocytosis    Blood cx obtained    No prior significant micro data on chart review    ESR 46, CRP 9      # Concern for Septic arthritis/ OM/ Epidural abscess  # Elevated inflammatory markers  # Abnormal MRI      - afebrile, no leucocytosis, no systemic signs, no FND  - blood cx NTD   - repeat MRI noted.   - S/p Ir aspirate- follow up cultures, NTD   - Continue Vancomycin and Ceftriaxone post IR procedure  - Check vanco through before 4th dose  - needs PICC, given negative blood cx, can place PICC   - will need 6 weeks of abx, until 1/23/25.   Pt needs iv abx with vanco and ceftriaxone for 6 weeks. Needs CBC, CMP, ESR, CRP, Vanco trough, once a week while on OPAT. Please email at [email or web address] or fax [phone].       Jesus Bryan  Please contact through MS Teams   If no response or past 5 pm/weekend call [phone].

## 2024-12-16 NOTE — PROGRESS NOTE ADULT - SUBJECTIVE AND OBJECTIVE BOX
64yPatient is a 64y old  Female who presents with a chief complaint of epidural abscess concern (16 Dec 2024 12:23)      Interval history:  Afebrile, no complains.       Allergies:   No Known Allergies      Antimicrobials:  cefTRIAXone   IVPB 2000 milliGRAM(s) IV Intermittent every 24 hours  vancomycin  IVPB 1000 milliGRAM(s) IV Intermittent every 12 hours      REVIEW OF SYSTEMS:  No chest pain   No SOB  No abdominal pain  No rash.       Vital Signs Last 24 Hrs  T(C): 36.6 (12-16-24 @ 18:16), Max: 36.7 (12-16-24 @ 14:37)  T(F): 97.8 (12-16-24 @ 18:16), Max: 98 (12-16-24 @ 14:37)  HR: 97 (12-16-24 @ 19:37) (92 - 114)  BP: 108/58 (12-16-24 @ 18:16) (108/58 - 125/68)  BP(mean): --  RR: 18 (12-16-24 @ 18:16) (16 - 18)  SpO2: 95% (12-16-24 @ 18:16) (95% - 99%)      PHYSICAL EXAM:  Pt in no acute distress, alert, awake.   breathing comfortably   non distended abdomen  no edema LE   no phlebitis                             11.6   6.18  )-----------( 350      ( 15 Dec 2024 05:49 )             34.4         Culture - Tissue with Gram Stain (12.13.24 @ 17:55)   Gram Stain:   No polymorphonuclear cells seen per low power field   No organisms seen per oil power field  Specimen Source: Tissue  Culture Results:   No growth to date.

## 2024-12-16 NOTE — CHART NOTE - NSCHARTNOTEFT_GEN_A_CORE
PRE-INTERVENTIONAL RADIOLOGY PROCEDURE NOTE    Patient Age: 64y  Patient Gender: Female  Procedure: PICC Placement  Diagnosis / Indication:   Approval Obtained From:   Ordering Attending Physician:   Pertinent labs:   Patient and Family aware? Yes No IR Pre-Procedure Note    Patient Age:   64y    Patient Gender:   Female    Procedure (including site / side if known): PICC    Diagnosis / Indication: Patient is a 64y old  Female who presents with a chief complaint of epidural abscess concern (16 Dec 2024 18:21)      Interventional Radiology Attending Physician:    Ordering Attending Physician: Dr. Allen    PAST MEDICAL & SURGICAL HISTORY:  Osteoporosis      History of COPD      Other nonspecific abnormal finding of lung field      Lung cancer      H/O ovarian cystectomy      History of tonsillectomy           Pertinent Labs:   CBC Full  -  ( 17 Dec 2024 06:31 )  WBC Count : 5.54 K/uL  RBC Count : 3.46 M/uL  Hemoglobin : 11.0 g/dL  Hematocrit : 33.2 %  Platelet Count - Automated : 327 K/uL  Mean Cell Volume : 96.0 fL  Mean Cell Hemoglobin : 31.8 pg  Mean Cell Hemoglobin Concentration : 33.1 g/dL  Auto Neutrophil # : x  Auto Lymphocyte # : x  Auto Monocyte # : x  Auto Eosinophil # : x  Auto Basophil # : x  Auto Neutrophil % : x  Auto Lymphocyte % : x  Auto Monocyte % : x  Auto Eosinophil % : x  Auto Basophil % : x    12-17    140  |  104  |  13  ----------------------------<  92  3.6   |  23  |  0.91    Ca    9.2      17 Dec 2024 06:31  Phos  3.8     12-17  Mg     2.10     12-17          Patient / Family aware of procedure:   [  ] Y   [  ] N

## 2024-12-16 NOTE — PROGRESS NOTE ADULT - ASSESSMENT
65yo Female with concern for right L5-S1 osteomyelitis/septic joint consulted to IR for bone biopsy to guide abx therapy. Blood cultures showed NGTD. Repeat MRI and CT imaging showed persistent changes at L5-S1, most prominent at right facet joint. Pt is now s/p Right L5-S1 facet aspiration on 12/13 in Interventional Radiology.    Plan:  - Follow up fluid studies (gram stain/culture)    [id number]

## 2024-12-16 NOTE — PROGRESS NOTE ADULT - SUBJECTIVE AND OBJECTIVE BOX
63yo Female s/p Right L5-S1 facet aspitation on 12/13 in Interventional Radiology.     Patient seen and examined at bedside, resting comfortably in armchair. Reports she is able to ambulate and her pain is controlled. No complaints offered at this time.    T(F): 97.5 (12-16-24 @ 10:03), Max: 98 (12-15-24 @ 17:56)  HR: 99 (12-16-24 @ 10:03) (86 - 109)  BP: 117/57 (12-16-24 @ 10:03) (105/62 - 126/73)  RR: 18 (12-16-24 @ 10:03) (16 - 18)  SpO2: 98% (12-16-24 @ 10:03) (98% - 100%)    LABS:             11.6   6.18  )-----------( 350      ( 15 Dec 2024 05:49 )             34.4       I&O's Detail  15 Dec 2024 07:01  -  16 Dec 2024 07:00  --------------------------------------------------------  IN:    Oral Fluid: 720 mL  Total IN: 720 mL  OUT:  Total OUT: 0 mL  Total NET: 720 mL      PHYSICAL EXAM:  General: Nontoxic, resting comfortably in bed, NAD  L5-S1 facet aspiration site: Dressing C/D/I. No pain upon palpation. No hematoma, swelling, or active bleeding noted.

## 2024-12-16 NOTE — PROGRESS NOTE ADULT - SUBJECTIVE AND OBJECTIVE BOX
Patient is a 64y old  Female who presents with a chief complaint of epidural abscess concern (15 Dec 2024 12:05)      DATE OF SERVICE: 12-16-24 @ 09:58    SUBJECTIVE / OVERNIGHT EVENTS: overnight events noted    ROS:  Resp: No cough no sputum production  CVS: No chest pain no palpitations no orthopnea  GI: no N/V/D  : no dysuria, no hematuria  Neuro: no weakness no paresthesias      MEDICATIONS  (STANDING):  busPIRone 5 milliGRAM(s) Oral two times a day  cefTRIAXone   IVPB 2000 milliGRAM(s) IV Intermittent every 24 hours  enoxaparin Injectable 40 milliGRAM(s) SubCutaneous every 24 hours  fluticasone propionate/ salmeterol 100-50 MICROgram(s) Diskus 1 Dose(s) Inhalation two times a day  influenza  Vaccine (HIGH DOSE) 0.5 milliLiter(s) IntraMuscular once  metoprolol tartrate 25 milliGRAM(s) Oral two times a day  tiotropium 2.5 MICROgram(s) Inhaler 2 Puff(s) Inhalation daily  vancomycin  IVPB 1000 milliGRAM(s) IV Intermittent every 12 hours    MEDICATIONS  (PRN):  acetaminophen     Tablet .. 650 milliGRAM(s) Oral every 6 hours PRN Temp greater or equal to 38C (100.4F), Mild Pain (1 - 3)  melatonin 3 milliGRAM(s) Oral at bedtime PRN Insomnia        CAPILLARY BLOOD GLUCOSE        I&O's Summary    15 Dec 2024 07:01  -  16 Dec 2024 07:00  --------------------------------------------------------  IN: 720 mL / OUT: 0 mL / NET: 720 mL        Vital Signs Last 24 Hrs  T(C): 36.3 (16 Dec 2024 05:05), Max: 36.7 (15 Dec 2024 17:56)  T(F): 97.4 (16 Dec 2024 05:05), Max: 98 (15 Dec 2024 17:56)  HR: 99 (16 Dec 2024 05:05) (86 - 109)  BP: 125/68 (16 Dec 2024 05:05) (105/62 - 126/73)  BP(mean): --  RR: 16 (16 Dec 2024 05:05) (16 - 18)  SpO2: 99% (16 Dec 2024 05:05) (98% - 100%)    PHYSICAL EXAM:  CHEST/LUNG: clear   HEART: S1 S2; no murmurs   ABDOMEN: Soft, Nontender  EXTREMITIES:    no edema  NEUROLOGY: AO x 3 non-focal  no LE weakness or sensory loss    LABS:                        11.6   6.18  )-----------( 350      ( 15 Dec 2024 05:49 )             34.4                       All consultant(s) notes reviewed and care discussed with other providers        Contact Number, Dr Allen [phone]

## 2024-12-17 ENCOUNTER — TRANSCRIPTION ENCOUNTER (OUTPATIENT)
Age: 64
End: 2024-12-17

## 2024-12-17 LAB
ANION GAP SERPL CALC-SCNC: 13 MMOL/L — SIGNIFICANT CHANGE UP (ref 7–14)
BUN SERPL-MCNC: 13 MG/DL — SIGNIFICANT CHANGE UP (ref 7–23)
CALCIUM SERPL-MCNC: 9.2 MG/DL — SIGNIFICANT CHANGE UP (ref 8.4–10.5)
CHLORIDE SERPL-SCNC: 104 MMOL/L — SIGNIFICANT CHANGE UP (ref 98–107)
CO2 SERPL-SCNC: 23 MMOL/L — SIGNIFICANT CHANGE UP (ref 22–31)
CREAT SERPL-MCNC: 0.91 MG/DL — SIGNIFICANT CHANGE UP (ref 0.5–1.3)
EGFR: 70 ML/MIN/1.73M2 — SIGNIFICANT CHANGE UP
GLUCOSE SERPL-MCNC: 92 MG/DL — SIGNIFICANT CHANGE UP (ref 70–99)
HCT VFR BLD CALC: 33.2 % — LOW (ref 34.5–45)
HGB BLD-MCNC: 11 G/DL — LOW (ref 11.5–15.5)
MAGNESIUM SERPL-MCNC: 2.1 MG/DL — SIGNIFICANT CHANGE UP (ref 1.6–2.6)
MCHC RBC-ENTMCNC: 31.8 PG — SIGNIFICANT CHANGE UP (ref 27–34)
MCHC RBC-ENTMCNC: 33.1 G/DL — SIGNIFICANT CHANGE UP (ref 32–36)
MCV RBC AUTO: 96 FL — SIGNIFICANT CHANGE UP (ref 80–100)
NRBC # BLD: 0 /100 WBCS — SIGNIFICANT CHANGE UP (ref 0–0)
NRBC # FLD: 0 K/UL — SIGNIFICANT CHANGE UP (ref 0–0)
PHOSPHATE SERPL-MCNC: 3.8 MG/DL — SIGNIFICANT CHANGE UP (ref 2.5–4.5)
PLATELET # BLD AUTO: 327 K/UL — SIGNIFICANT CHANGE UP (ref 150–400)
POTASSIUM SERPL-MCNC: 3.6 MMOL/L — SIGNIFICANT CHANGE UP (ref 3.5–5.3)
POTASSIUM SERPL-SCNC: 3.6 MMOL/L — SIGNIFICANT CHANGE UP (ref 3.5–5.3)
RBC # BLD: 3.46 M/UL — LOW (ref 3.8–5.2)
RBC # FLD: 12.8 % — SIGNIFICANT CHANGE UP (ref 10.3–14.5)
SODIUM SERPL-SCNC: 140 MMOL/L — SIGNIFICANT CHANGE UP (ref 135–145)
VANCOMYCIN TROUGH SERPL-MCNC: 18.7 UG/ML — SIGNIFICANT CHANGE UP (ref 10–20)
WBC # BLD: 5.54 K/UL — SIGNIFICANT CHANGE UP (ref 3.8–10.5)
WBC # FLD AUTO: 5.54 K/UL — SIGNIFICANT CHANGE UP (ref 3.8–10.5)

## 2024-12-17 PROCEDURE — 99232 SBSQ HOSP IP/OBS MODERATE 35: CPT

## 2024-12-17 PROCEDURE — 36573 INSJ PICC RS&I 5 YR+: CPT

## 2024-12-17 RX ORDER — VANCOMYCIN HCL 900 MCG/MG
1 POWDER (GRAM) MISCELLANEOUS
Qty: 1 | Refills: 0
Start: 2024-12-17 | End: 2025-01-15

## 2024-12-17 RX ORDER — VANCOMYCIN HCL 900 MCG/MG
1.5 POWDER (GRAM) MISCELLANEOUS
Qty: 1 | Refills: 0
Start: 2024-12-17 | End: 2025-01-15

## 2024-12-17 RX ORDER — CEFTRIAXONE SODIUM 1 G
2 VIAL (EA) INJECTION
Qty: 1 | Refills: 0
Start: 2024-12-17

## 2024-12-17 RX ORDER — CHLORHEXIDINE GLUCONATE 1.2 MG/ML
1 RINSE ORAL
Refills: 0 | Status: DISCONTINUED | OUTPATIENT
Start: 2024-12-17 | End: 2024-12-18

## 2024-12-17 RX ORDER — METOPROLOL TARTRATE 100 MG/1
1 TABLET, FILM COATED ORAL
Refills: 0 | DISCHARGE

## 2024-12-17 RX ORDER — ACETAMINOPHEN 500MG 500 MG/1
2 TABLET, COATED ORAL
Qty: 0 | Refills: 0 | DISCHARGE
Start: 2024-12-17

## 2024-12-17 RX ORDER — VANCOMYCIN HCL 900 MCG/MG
1500 POWDER (GRAM) MISCELLANEOUS EVERY 24 HOURS
Refills: 0 | Status: DISCONTINUED | OUTPATIENT
Start: 2024-12-17 | End: 2024-12-18

## 2024-12-17 RX ORDER — SODIUM CHLORIDE 9 MG/ML
10 INJECTION, SOLUTION INTRAMUSCULAR; INTRAVENOUS; SUBCUTANEOUS
Refills: 0 | Status: DISCONTINUED | OUTPATIENT
Start: 2024-12-17 | End: 2024-12-18

## 2024-12-17 RX ADMIN — Medication 5 MILLIGRAM(S): at 05:16

## 2024-12-17 RX ADMIN — Medication 5 MILLIGRAM(S): at 18:19

## 2024-12-17 RX ADMIN — Medication 300 MILLIGRAM(S): at 13:52

## 2024-12-17 RX ADMIN — ACETAMINOPHEN 500MG 650 MILLIGRAM(S): 500 TABLET, COATED ORAL at 13:46

## 2024-12-17 RX ADMIN — METOPROLOL TARTRATE 25 MILLIGRAM(S): 100 TABLET, FILM COATED ORAL at 05:16

## 2024-12-17 RX ADMIN — Medication 100 MILLIGRAM(S): at 20:54

## 2024-12-17 RX ADMIN — FLUTICASONE PROPIONATE AND SALMETEROL XINAFOATE 1 DOSE(S): 45; 21 AEROSOL, METERED RESPIRATORY (INHALATION) at 18:20

## 2024-12-17 RX ADMIN — ACETAMINOPHEN 500MG 650 MILLIGRAM(S): 500 TABLET, COATED ORAL at 13:16

## 2024-12-17 RX ADMIN — METOPROLOL TARTRATE 25 MILLIGRAM(S): 100 TABLET, FILM COATED ORAL at 18:18

## 2024-12-17 RX ADMIN — Medication 2 PUFF(S): at 20:54

## 2024-12-17 RX ADMIN — Medication 250 MILLIGRAM(S): at 00:40

## 2024-12-17 NOTE — PROCEDURE NOTE - PROCEDURE FINDINGS AND DETAILS
Right L5-S1 facet aspiration with specimen for culture.
Successful placement of 4Fr single-lumen PICC in LUE. Tip of PICC in SVC. Length 37cm.

## 2024-12-17 NOTE — PROGRESS NOTE ADULT - SUBJECTIVE AND OBJECTIVE BOX
Patient is a 64y old  Female who presents with a chief complaint of epidural abscess concern (17 Dec 2024 09:43)      DATE OF SERVICE: 12-17-24 @ 13:24    SUBJECTIVE / OVERNIGHT EVENTS: overnight events noted    ROS:  Resp: No cough no sputum production  CVS: No chest pain no palpitations no orthopnea  GI: no N/V/D      MEDICATIONS  (STANDING):  busPIRone 5 milliGRAM(s) Oral two times a day  cefTRIAXone   IVPB 2000 milliGRAM(s) IV Intermittent every 24 hours  chlorhexidine 4% Liquid 1 Application(s) Topical <User Schedule>  fluticasone propionate/ salmeterol 100-50 MICROgram(s) Diskus 1 Dose(s) Inhalation two times a day  influenza  Vaccine (HIGH DOSE) 0.5 milliLiter(s) IntraMuscular once  metoprolol tartrate 25 milliGRAM(s) Oral two times a day  tiotropium 2.5 MICROgram(s) Inhaler 2 Puff(s) Inhalation daily  vancomycin  IVPB 1500 milliGRAM(s) IV Intermittent every 24 hours    MEDICATIONS  (PRN):  acetaminophen     Tablet .. 650 milliGRAM(s) Oral every 6 hours PRN Temp greater or equal to 38C (100.4F), Mild Pain (1 - 3)  melatonin 3 milliGRAM(s) Oral at bedtime PRN Insomnia  sodium chloride 0.9% lock flush 10 milliLiter(s) IV Push every 1 hour PRN Pre/post blood products, medications, blood draw, and to maintain line patency        CAPILLARY BLOOD GLUCOSE        I&O's Summary    16 Dec 2024 07:01  -  17 Dec 2024 07:00  --------------------------------------------------------  IN: 1630 mL / OUT: 0 mL / NET: 1630 mL        Vital Signs Last 24 Hrs  T(C): 36.8 (17 Dec 2024 11:22), Max: 36.8 (16 Dec 2024 22:00)  T(F): 98.2 (17 Dec 2024 11:22), Max: 98.2 (16 Dec 2024 22:00)  HR: 83 (17 Dec 2024 11:22) (82 - 115)  BP: 127/66 (17 Dec 2024 11:22) (105/57 - 136/73)  BP(mean): 82 (17 Dec 2024 11:22) (67 - 82)  RR: 20 (17 Dec 2024 11:22) (17 - 20)  SpO2: 97% (17 Dec 2024 11:22) (95% - 99%)      PHYSICAL EXAM:  CHEST/LUNG: clear   HEART: S1 S2; no murmurs   ABDOMEN: Soft, Nontender  EXTREMITIES:    no edema  NEUROLOGY: AO x 3 non-focal  no LE weakness or sensory loss    LABS:                        11.0   5.54  )-----------( 327      ( 17 Dec 2024 06:31 )             33.2     12-17    140  |  104  |  13  ----------------------------<  92  3.6   |  23  |  0.91    Ca    9.2      17 Dec 2024 06:31  Phos  3.8     12-17  Mg     2.10     12-17            Urinalysis Basic - ( 17 Dec 2024 06:31 )    Color: x / Appearance: x / SG: x / pH: x  Gluc: 92 mg/dL / Ketone: x  / Bili: x / Urobili: x   Blood: x / Protein: x / Nitrite: x   Leuk Esterase: x / RBC: x / WBC x   Sq Epi: x / Non Sq Epi: x / Bacteria: x          All consultant(s) notes reviewed and care discussed with other providers        Contact Number, Dr Allen [phone]

## 2024-12-17 NOTE — PROGRESS NOTE ADULT - ASSESSMENT
64F w/ hx of Lung CA s/p resection, HTN, anxiety, p/w concern for epidural infection after repeat imaging w/ Dr. Merino. Concern for infection began in October when MRI showed ambiguous findings of infection at L5-S1. Repeat imaging this week revealed progression of infection so she was sent in for infectious workup. She has had RLE radiculopathy for several years. She has not noticed her symptoms to have gotten any worse recently. She has been able to walk without difficulty. Denies focal weakness, numbness/tingling, or radicular sxs. Denies fevers/chills, acute changes in bowel/bladder function, or saddle anesthesia. Denies recent falls/trauma or any other ortho injuries. Community ambulator w/o assistive devices at baseline. She does report recent infection of UTI which was treated with PO abx. Patient otherwise denies fevers chills or back pain. Still has sciatica but is tolerable. Denies any procedures for past several months.    ER/hospital course: afebrile, no leucocytosis    Blood cx obtained    No prior significant micro data on chart review    ESR 46, CRP 9      # Concern for Septic arthritis/ OM/ Epidural abscess  # Elevated inflammatory markers  # Abnormal MRI      - afebrile, no leucocytosis, no systemic signs, no FND  - blood cx NTD   - repeat MRI noted.   - S/p Ir aspirate- follow up cultures, NTD   - Continue Vancomycin and Ceftriaxone post IR procedure  - based on AUC, changed dose to 1500 mg q24h   - Check vanco through before 4th dose  - s/p PICC  - will need 6 weeks of abx, until 1/23/25.   Pt needs iv abx with vanco and ceftriaxone for 6 weeks. Needs CBC, CMP, ESR, CRP, Vanco trough, once a week while on OPAT. Please email at [email or web address] or fax [phone].   pt to follow with me in 4 weeks.     Will sign off, please call with questions.       Jesus Bryan  Please contact through MS Teams   If no response or past 5 pm/weekend call [phone].

## 2024-12-17 NOTE — DISCHARGE NOTE PROVIDER - PROVIDER TOKENS
PROVIDER:[TOKEN:[[id number]],FOLLOWUP:[2 months]],PROVIDER:[TOKEN:[[id number]],FOLLOWUP:[1 week]],PROVIDER:[TOKEN:[[id number]],FOLLOWUP:[1 week]]

## 2024-12-17 NOTE — DISCHARGE NOTE PROVIDER - HOSPITAL COURSE
64F w/ hx of Lung CA s/p resection, HTN, anxiety, p/w concern for osteomyelitis and epidural abscess after repeat imaging with outpatient ortho     Epidural abscess.   -patient remains neurologically fully intact  continue antibiotics per ID day 4 today  aspirate remains negative  discussed with ID  vancomycin 1 gm q 12 and ceftriaxone IV 2 gm qd thru Jan 23th for total of 6 weeks  follow up with Dr Merino  PICC placed on 12/17/2024     Lung cancer.   - CT chest negative recurrence  outpatient surveillance I year.    Essential hypertension.   -Trend BP  -Cont. Metoprolol BID.     Anxiety.   -Cont. buspirone BID.    Prophylactic measure.   -continue enoxaparin while inpatient.    On 12/17/2024 this case was reviewed with Dr. Allen the patient is medically stable and optimized for discharge. All medications were reviewed and prescriptions were sent to mutually agreed upon pharmacy.     64F w/ hx of Lung CA s/p resection, HTN, anxiety, p/w concern for osteomyelitis and epidural abscess after repeat imaging with outpatient ortho     Epidural abscess.   -patient remains neurologically fully intact  continue antibiotics per ID day 4 today  aspirate remains negative  discussed with ID  vancomycin 1 gm q 12 and ceftriaxone IV 2 gm qd thru Jan 23th for total of 6 weeks  follow up with Dr Merino  PICC placed on 12/17/2024     Lung cancer.   - CT chest negative recurrence  outpatient surveillance I year.    Essential hypertension.   -Trend BP  -Cont. Metoprolol BID.     Anxiety.   -Cont. buspirone BID.    Prophylactic measure.   -continue enoxaparin while inpatient.    On 12/18/2024 this case was reviewed with Dr. Allen the patient is medically stable and optimized for discharge. All medications were reviewed and prescriptions were sent to mutually agreed upon pharmacy.

## 2024-12-17 NOTE — DISCHARGE NOTE PROVIDER - NSDCFUSCHEDAPPT_GEN_ALL_CORE_FT
[facility] [id number]   Scheduled Appointment: 01/07/2025    Arden Merino  [facility]  ORTHOSUR [address] [facility]  Scheduled Appointment: 02/21/2025

## 2024-12-17 NOTE — DISCHARGE NOTE PROVIDER - NSDCMRMEDTOKEN_GEN_ALL_CORE_FT
busPIRone 5 mg oral tablet: 1 tab(s) orally 2 times a day  metoprolol tartrate 25 mg oral tablet: 2 tab(s) orally once a day  Metoprolol Tartrate 25 mg oral tablet: 1 tab(s) orally once a day (at bedtime)  Trelegy Ellipta 100 mcg-62.5 mcg-25 mcg/inh inhalation powder: 1 inhaled once a day   acetaminophen 325 mg oral tablet: 2 tab(s) orally every 6 hours As needed Temp greater or equal to 38C (100.4F), Mild Pain (1 - 3)  busPIRone 5 mg oral tablet: 1 tab(s) orally 2 times a day  metoprolol tartrate 25 mg oral tablet: 2 tab(s) orally once a day  Trelegy Ellipta 100 mcg-62.5 mcg-25 mcg/inh inhalation powder: 1 inhaled once a day   acetaminophen 325 mg oral tablet: 2 tab(s) orally every 6 hours As needed Temp greater or equal to 38C (100.4F), Mild Pain (1 - 3)  busPIRone 5 mg oral tablet: 1 tab(s) orally 2 times a day  cefTRIAXone 2 g/50 mL-iso-osmotic dextrose intravenous solution: 2 gram(s) intravenously every 24 hours Last day 01/23/2025. -    Needs CBC, CMP, ESR, CRP, Vanco trough, once a week while on OPAT. Please email at [email or web address] or fax [phone].  metoprolol tartrate 25 mg oral tablet: 2 tab(s) orally once a day  Trelegy Ellipta 100 mcg-62.5 mcg-25 mcg/inh inhalation powder: 1 inhaled once a day  vancomycin 1.5 g/500 mL-NaCl 0.9% intravenous solution: 1.5 gram(s) intravenous every 24 hours until 1/23/25.    Needs CBC, CMP, ESR, CRP, Vanco trough, once a week while on OPAT. Please email at [email or web address] or fax [phone].

## 2024-12-17 NOTE — DISCHARGE NOTE PROVIDER - NSDCCPCAREPLAN_GEN_ALL_CORE_FT
PRINCIPAL DISCHARGE DIAGNOSIS  Diagnosis: Osteomyelitis of spine  Assessment and Plan of Treatment: [facility] of the spine results were:  Findings most consistent with septic arthritis and osteomyelitis of the   right L5/S1 facet articulation involving the right L5 pedicle and right   sacral ala.Dorsal epidural enhancement spanning L4-5 L5 to the imaged sacrum with displacement of the thecal sac to the left and suspicion of significant infectious involvement of prominent epidural fat ((lipomatosis) given the low signal T1 and increased signal T2 with associated enhancement. There is no rim-enhancing collection appreciated. This is seen on the prior study as well where markedly abnormal soft tissue signal T1 with enhancement displaces the thecal sac in its intracanal location beginning at the level of L5 to the left extending down through the level of the sacrum. Involvement of the right 5 1 neural foramen is seen with enhancement surrounding the exiting right L5 nerve root.  You underwent aspiration with Interventional Intervention.   Follow up infection disease recommendations:  -You had a PICC line placed   - You nrrf 6 weeks of abx, until 1/23/25.   Pt needs iv abx with vanco and ceftriaxone for 6 weeks.   Needs CBC, CMP, ESR, CRP, Vanco trough, once a week while on OPAT. Please email at [email or web address] or fax [phone].         SECONDARY DISCHARGE DIAGNOSES  Diagnosis: Epidural abscess  Assessment and Plan of Treatment: You undewent aspiration with interventional radiolody.     PRINCIPAL DISCHARGE DIAGNOSIS  Diagnosis: Osteomyelitis of spine  Assessment and Plan of Treatment: [facility] MRI of the spine results were:  Findings most consistent with septic arthritis and osteomyelitis of the   right L5/S1 facet articulation involving the right L5 pedicle and right   sacral ala.Dorsal epidural enhancement spanning L4-5 L5 to the imaged sacrum with displacement of the thecal sac to the left and suspicion of significant infectious involvement of prominent epidural fat ((lipomatosis) given the low signal T1 and increased signal T2 with associated enhancement. There is no rim-enhancing collection appreciated. This is seen on the prior study as well where markedly abnormal soft tissue signal T1 with enhancement displaces the thecal sac in its intracanal location beginning at the level of L5 to the left extending down through the level of the sacrum. Involvement of the right 5 1 neural foramen is seen with enhancement surrounding the exiting right L5 nerve root.  You underwent aspiration with Interventional Intervention.   Follow up infection disease recommendations:  -You had a PICC line placed   - You nrrf 6 weeks of abx, until 1/23/25.   Pt needs iv abx with vanco and ceftriaxone for 6 weeks.   Needs CBC, CMP, ESR, CRP, Vanco trough, once a week while on OPAT. Please email at [email or web address] or fax [phone].         SECONDARY DISCHARGE DIAGNOSES  Diagnosis: Essential hypertension  Assessment and Plan of Treatment: Continue blood pressure medication regimen as directed. Monitor for any visual changes, headaches or dizziness.  Monitor blood pressure regularly.  Follow up with your primary care provider for further management for high blood pressure.      Diagnosis: Lung cancer  Assessment and Plan of Treatment: Follow up with your outpatient medical provider within 1-2 weeks of discharge regarding outpatient surveillance.      Diagnosis: Epidural abscess  Assessment and Plan of Treatment: You undewent aspiration with interventional radiolody.     PRINCIPAL DISCHARGE DIAGNOSIS  Diagnosis: Osteomyelitis of spine  Assessment and Plan of Treatment: [facility] MRI of the spine results were:  Findings most consistent with septic arthritis and osteomyelitis of the   right L5/S1 facet articulation involving the right L5 pedicle and right   sacral ala.Dorsal epidural enhancement spanning L4-5 L5 to the imaged sacrum with displacement of the thecal sac to the left and suspicion of significant infectious involvement of prominent epidural fat ((lipomatosis) given the low signal T1 and increased signal T2 with associated enhancement. There is no rim-enhancing collection appreciated. This is seen on the prior study as well where markedly abnormal soft tissue signal T1 with enhancement displaces the thecal sac in its intracanal location beginning at the level of L5 to the left extending down through the level of the sacrum. Involvement of the right 5 1 neural foramen is seen with enhancement surrounding the exiting right L5 nerve root.  You underwent aspiration with Interventional Intervention.   Follow up infection disease recommendations:  -You had a PICC line placed   - Continue antibiotics for 6 weeks of , until 1/23/25.   Pt needs iv abx with vanco and ceftriaxone for 6 weeks.   Needs CBC, CMP, ESR, CRP, Vanco trough, once a week while on OPAT. Please email at [email or web address] or fax [phone].   Follow up with repeat blood work Monday.         SECONDARY DISCHARGE DIAGNOSES  Diagnosis: Essential hypertension  Assessment and Plan of Treatment: Continue blood pressure medication regimen as directed. Monitor for any visual changes, headaches or dizziness.  Monitor blood pressure regularly.  Follow up with your primary care provider for further management for high blood pressure.      Diagnosis: Lung cancer  Assessment and Plan of Treatment: Follow up with your outpatient medical provider within 1-2 weeks of discharge regarding outpatient surveillance.      Diagnosis: Epidural abscess  Assessment and Plan of Treatment: You undewent aspiration with interventional radiolody.

## 2024-12-17 NOTE — DISCHARGE NOTE PROVIDER - CARE PROVIDER_API CALL
Arden Merino)  Spine Surgery  [address], [address]  [place] [address]  Phone: [phone]  Fax: [phone]  Follow Up Time: 2 months    Freedom Fallon  Internal Medicine  [address] [place] [address], [address]  [place] [address]  Phone: [phone]  Fax: [phone]  Follow Up Time: 1 week    Jesus Martinez  Infectious Disease  [address] [place] [id number]  Phone: [phone]  Fax: [phone]  Follow Up Time: 1 week   26-Sep-2021

## 2024-12-17 NOTE — DISCHARGE NOTE PROVIDER - CARE PROVIDERS DIRECT ADDRESSES
,[email or web address],DirectAddress_Unknown,[email or web address]

## 2024-12-17 NOTE — PROGRESS NOTE ADULT - SUBJECTIVE AND OBJECTIVE BOX
64yPatient is a 64y old  Female who presents with a chief complaint of epidural abscess concern (17 Dec 2024 13:23)      Interval history:  Afebrile, no new complains. s/p PICC.       Allergies:   No Known Allergies      Antimicrobials:  cefTRIAXone   IVPB 2000 milliGRAM(s) IV Intermittent every 24 hours  vancomycin  IVPB 1500 milliGRAM(s) IV Intermittent every 24 hours      REVIEW OF SYSTEMS:  No chest pain   No SOB  No abdominal pain  No rash.       Vital Signs Last 24 Hrs  T(C): 36.4 (12-17-24 @ 17:32), Max: 36.8 (12-16-24 @ 22:00)  T(F): 97.6 (12-17-24 @ 17:32), Max: 98.2 (12-16-24 @ 22:00)  HR: 88 (12-17-24 @ 17:32) (82 - 115)  BP: 130/75 (12-17-24 @ 17:32) (102/63 - 136/73)  BP(mean): 82 (12-17-24 @ 11:22) (67 - 82)  RR: 18 (12-17-24 @ 17:32) (17 - 20)  SpO2: 98% (12-17-24 @ 17:32) (96% - 98%)      PHYSICAL EXAM:  Pt in no acute distress, alert, awake.   breathing comfortably   non distended abdomen  no edema LE   s/p PICC   no phlebitis                             11.0   5.54  )-----------( 327      ( 17 Dec 2024 06:31 )             33.2   12-17    140  |  104  |  13  ----------------------------<  92  3.6   |  23  |  0.91    Ca    9.2      17 Dec 2024 06:31  Phos  3.8     12-17  Mg     2.10     12-17      Culture - Tissue with Gram Stain (12.13.24 @ 17:55)   Gram Stain:   No polymorphonuclear cells seen per low power field   No organisms seen per oil power field  Specimen Source: Tissue  Culture Results:   No growth to date.

## 2024-12-18 ENCOUNTER — TRANSCRIPTION ENCOUNTER (OUTPATIENT)
Age: 64
End: 2024-12-18

## 2024-12-18 VITALS
RESPIRATION RATE: 18 BRPM | TEMPERATURE: 98 F | DIASTOLIC BLOOD PRESSURE: 57 MMHG | HEART RATE: 102 BPM | SYSTOLIC BLOOD PRESSURE: 108 MMHG | OXYGEN SATURATION: 97 %

## 2024-12-18 LAB
ANION GAP SERPL CALC-SCNC: 14 MMOL/L — SIGNIFICANT CHANGE UP (ref 7–14)
BUN SERPL-MCNC: 14 MG/DL — SIGNIFICANT CHANGE UP (ref 7–23)
CALCIUM SERPL-MCNC: 9.4 MG/DL — SIGNIFICANT CHANGE UP (ref 8.4–10.5)
CHLORIDE SERPL-SCNC: 103 MMOL/L — SIGNIFICANT CHANGE UP (ref 98–107)
CO2 SERPL-SCNC: 23 MMOL/L — SIGNIFICANT CHANGE UP (ref 22–31)
CREAT SERPL-MCNC: 0.85 MG/DL — SIGNIFICANT CHANGE UP (ref 0.5–1.3)
CULTURE RESULTS: SIGNIFICANT CHANGE UP
EGFR: 76 ML/MIN/1.73M2 — SIGNIFICANT CHANGE UP
GLUCOSE SERPL-MCNC: 94 MG/DL — SIGNIFICANT CHANGE UP (ref 70–99)
HCT VFR BLD CALC: 34.2 % — LOW (ref 34.5–45)
HGB BLD-MCNC: 11.7 G/DL — SIGNIFICANT CHANGE UP (ref 11.5–15.5)
MAGNESIUM SERPL-MCNC: 2.1 MG/DL — SIGNIFICANT CHANGE UP (ref 1.6–2.6)
MCHC RBC-ENTMCNC: 32.3 PG — SIGNIFICANT CHANGE UP (ref 27–34)
MCHC RBC-ENTMCNC: 34.2 G/DL — SIGNIFICANT CHANGE UP (ref 32–36)
MCV RBC AUTO: 94.5 FL — SIGNIFICANT CHANGE UP (ref 80–100)
NRBC # BLD: 0 /100 WBCS — SIGNIFICANT CHANGE UP (ref 0–0)
NRBC # FLD: 0 K/UL — SIGNIFICANT CHANGE UP (ref 0–0)
PHOSPHATE SERPL-MCNC: 3.8 MG/DL — SIGNIFICANT CHANGE UP (ref 2.5–4.5)
PLATELET # BLD AUTO: 342 K/UL — SIGNIFICANT CHANGE UP (ref 150–400)
POTASSIUM SERPL-MCNC: 4.1 MMOL/L — SIGNIFICANT CHANGE UP (ref 3.5–5.3)
POTASSIUM SERPL-SCNC: 4.1 MMOL/L — SIGNIFICANT CHANGE UP (ref 3.5–5.3)
RBC # BLD: 3.62 M/UL — LOW (ref 3.8–5.2)
RBC # FLD: 12.5 % — SIGNIFICANT CHANGE UP (ref 10.3–14.5)
SODIUM SERPL-SCNC: 140 MMOL/L — SIGNIFICANT CHANGE UP (ref 135–145)
SPECIMEN SOURCE: SIGNIFICANT CHANGE UP
WBC # BLD: 7.21 K/UL — SIGNIFICANT CHANGE UP (ref 3.8–10.5)
WBC # FLD AUTO: 7.21 K/UL — SIGNIFICANT CHANGE UP (ref 3.8–10.5)

## 2024-12-18 RX ADMIN — Medication 300 MILLIGRAM(S): at 11:49

## 2024-12-18 RX ADMIN — METOPROLOL TARTRATE 25 MILLIGRAM(S): 100 TABLET, FILM COATED ORAL at 06:26

## 2024-12-18 RX ADMIN — FLUTICASONE PROPIONATE AND SALMETEROL XINAFOATE 1 DOSE(S): 45; 21 AEROSOL, METERED RESPIRATORY (INHALATION) at 11:49

## 2024-12-18 RX ADMIN — Medication 5 MILLIGRAM(S): at 06:26

## 2024-12-18 NOTE — PROGRESS NOTE ADULT - PROBLEM SELECTOR PLAN 2
CT chest negative recurrence  outpatient surveillance I year
CT chest negative recurrence
CT chest negative recurrence  outpatient surveillance I year
CT chest negative recurrence  outpatient surveillance 1 yesr
Hx of Lung Resection, pt states she is due for surveillance CT chest on Tuesday  -Consider ordering CT chest non-con if able to be followed after discharge  -Trelegy substitution
CT chest negative recurrence  outpatient surveillance I year

## 2024-12-18 NOTE — PROGRESS NOTE ADULT - PROBLEM SELECTOR PLAN 4
-Cont. buspirone BID

## 2024-12-18 NOTE — PROGRESS NOTE ADULT - PROVIDER SPECIALTY LIST ADULT
Infectious Disease
Internal Medicine
Orthopedics
Anesthesia
Infectious Disease
Intervent Radiology
Orthopedics
Infectious Disease
Internal Medicine

## 2024-12-18 NOTE — PROGRESS NOTE ADULT - PROBLEM SELECTOR PROBLEM 3
Essential hypertension
Include Pregnancy/Lactation Warning?: No

## 2024-12-18 NOTE — PROGRESS NOTE ADULT - PROBLEM SELECTOR PLAN 3
-Trend BP  -Cont. Metoprolol BID

## 2024-12-18 NOTE — PROGRESS NOTE ADULT - NSPROGADDITIONALINFOA_GEN_ALL_CORE
discussed with patient, expresses understanding of treatment plans.
discussed with patient's son at the bedside in detail
discussed with patient, expresses understanding of treatment plans.
discussed with patient, expresses understanding of treatment plans.  discussed with ortho attending
discussed with covering ACP
discussed with patient, expresses understanding of treatment plans.  discharge planning
discussed with patient, expresses understanding of treatment plans.

## 2024-12-18 NOTE — PROGRESS NOTE ADULT - REASON FOR ADMISSION
epidural abscess concern

## 2024-12-18 NOTE — PROGRESS NOTE ADULT - PROBLEM SELECTOR PROBLEM 1
Epidural abscess

## 2024-12-18 NOTE — DISCHARGE NOTE NURSING/CASE MANAGEMENT/SOCIAL WORK - FINANCIAL ASSISTANCE
[facility] provides services at a reduced cost to those who are determined to be eligible through [facility]’s financial assistance program. Information regarding [facility]’s financial assistance program can be found by going to [email or web address] or by calling [phone].

## 2024-12-18 NOTE — PROGRESS NOTE ADULT - PROBLEM SELECTOR PLAN 1
awaiting IR aspiration Fri  CT and MRI noted  blood cultures negative   discussed with ortho attending  OK to await till Fri for IR aspiration  patient remains neurologically fully intact
awaiting IR aspiration tomorrow   discussed with ortho attending  patient remains neurologically fully intact
s/p IR aspiration   so far negative  patient remains neurologically fully intact  continue antibiotics per ID day 3 today
awaiting IR aspiration   blood cultures negative   awaiting MRI  result   ID consultation appreciated
awaiting IR aspiration   patient remains neurologically fully intact
s/p IR aspiration   so far negative  patient remains neurologically fully intact  continue antibiotics per ID day 2 today
Recent MRI concerning for OM and epidural abscess. Note lack of leukocytosis of fever. Slight elevation in inflammatory markers. Appreciate orthopedic recommendations.   IR consult appreciated  for repeat MRI   awaiting blood cultures since if positive we may not need any procedures  ID consultation appreciated
patient remains neurologically fully intact  continue antibiotics per ID day 4 today  aspirate remains negative  discussed with ID  vancomycin 1 gm q 12 and ceftriaxone IV 2 gm qd thru Jan 27th for total of 6 weeks  follow up with Dr Merino  [facility] eval
continue antibiotics per ID day 5 today  aspirate remains negative  vancomycin 1 gm q 12 and ceftriaxone IV 2 gm qd total of 6 weeks  follow up with Dr Merino  [facility] done
patient remains neurologically fully intact  continue antibiotics per ID day 4 today  aspirate remains negative  discussed with ID  vancomycin 1 gm q 12 and ceftriaxone IV 2 gm qd thru Jan 27th for total of 6 weeks  follow up with Dr Merino  [facility] today

## 2024-12-18 NOTE — PROGRESS NOTE ADULT - SUBJECTIVE AND OBJECTIVE BOX
Patient is a 64y old  Female who presents with a chief complaint of epidural abscess concern (17 Dec 2024 17:53)      DATE OF SERVICE: 12-18-24 @ 11:30    SUBJECTIVE / OVERNIGHT EVENTS: overnight events noted    ROS:  Resp: No cough no sputum production  CVS: No chest pain no palpitations no orthopnea  GI: no N/V/D    MEDICATIONS  (STANDING):  busPIRone 5 milliGRAM(s) Oral two times a day  cefTRIAXone   IVPB 2000 milliGRAM(s) IV Intermittent every 24 hours  chlorhexidine 4% Liquid 1 Application(s) Topical <User Schedule>  fluticasone propionate/ salmeterol 100-50 MICROgram(s) Diskus 1 Dose(s) Inhalation two times a day  influenza  Vaccine (HIGH DOSE) 0.5 milliLiter(s) IntraMuscular once  metoprolol tartrate 25 milliGRAM(s) Oral two times a day  tiotropium 2.5 MICROgram(s) Inhaler 2 Puff(s) Inhalation daily  vancomycin  IVPB 1500 milliGRAM(s) IV Intermittent every 24 hours    MEDICATIONS  (PRN):  acetaminophen     Tablet .. 650 milliGRAM(s) Oral every 6 hours PRN Temp greater or equal to 38C (100.4F), Mild Pain (1 - 3)  melatonin 3 milliGRAM(s) Oral at bedtime PRN Insomnia  sodium chloride 0.9% lock flush 10 milliLiter(s) IV Push every 1 hour PRN Pre/post blood products, medications, blood draw, and to maintain line patency        CAPILLARY BLOOD GLUCOSE        I&O's Summary    17 Dec 2024 07:01  -  18 Dec 2024 07:00  --------------------------------------------------------  IN: 220 mL / OUT: 0 mL / NET: 220 mL        Vital Signs Last 24 Hrs  T(C): 36.3 (18 Dec 2024 09:48), Max: 36.6 (18 Dec 2024 05:00)  T(F): 97.3 (18 Dec 2024 09:48), Max: 97.9 (18 Dec 2024 05:00)  HR: 90 (18 Dec 2024 09:48) (88 - 95)  BP: 108/63 (18 Dec 2024 09:48) (102/63 - 130/75)  BP(mean): --  RR: 18 (18 Dec 2024 09:48) (18 - 18)  SpO2: 97% (18 Dec 2024 09:48) (97% - 98%)    PHYSICAL EXAM:  CHEST/LUNG: clear   HEART: S1 S2; no murmurs   ABDOMEN: Soft, Nontender  EXTREMITIES:    no edema  NEUROLOGY: AO x 3 non-focal  no LE weakness or sensory loss    LABS:                        11.7   7.21  )-----------( 342      ( 18 Dec 2024 06:50 )             34.2     12-18    140  |  103  |  14  ----------------------------<  94  4.1   |  23  |  0.85    Ca    9.4      18 Dec 2024 06:50  Phos  3.8     12-18  Mg     2.10     12-18            Urinalysis Basic - ( 18 Dec 2024 06:50 )    Color: x / Appearance: x / SG: x / pH: x  Gluc: 94 mg/dL / Ketone: x  / Bili: x / Urobili: x   Blood: x / Protein: x / Nitrite: x   Leuk Esterase: x / RBC: x / WBC x   Sq Epi: x / Non Sq Epi: x / Bacteria: x          All consultant(s) notes reviewed and care discussed with other providers        Contact Number, Dr Allen [phone]

## 2024-12-18 NOTE — DISCHARGE NOTE NURSING/CASE MANAGEMENT/SOCIAL WORK - NSDCPEFALRISK_GEN_ALL_CORE
For information on Fall & Injury Prevention, visit: [email or web address] OR  [email or web address] OR  https://www.cdc.gov/steadi/patient.html

## 2024-12-18 NOTE — DISCHARGE NOTE NURSING/CASE MANAGEMENT/SOCIAL WORK - PATIENT PORTAL LINK FT
You can access the FollowMyHealth Patient Portal offered by [facility] by registering at the following website: [email or web address]. By joining [facility]’s FollowMyHealth portal, you will also be able to view your health information using other applications (apps) compatible with our system.

## 2024-12-18 NOTE — DISCHARGE NOTE NURSING/CASE MANAGEMENT/SOCIAL WORK - NSSCNAMETXT_GEN_ALL_CORE
Infusion services that were arranged with [facility] for home infusion services. The visiting RN is scheduled to visit on the day of discharge between the hours of 7PM-9PM. Please contact agency with any questions or concerns. [phone]    You were also referred to [facility] for nursing services. A visit is scheduled for the day after discharge. The RN will call to schedule a time to visit. You can call the agency with any questions or concerns [phone].

## 2024-12-18 NOTE — PROGRESS NOTE ADULT - PROBLEM SELECTOR PLAN 5
continue enoxaparin while inpatient
continue enoxaparin while inpatient
DVT PPx  -SCDs for now
active  low risk  SCD while in bed
continue enoxaparin while inpatient
active  low risk  SCD while in bed
active  low risk  SCD while in bed
DVT PPx  -SCDs for now
start enoxaparin
start enoxaparin

## 2024-12-23 ENCOUNTER — APPOINTMENT (OUTPATIENT)
Dept: CT IMAGING | Facility: IMAGING CENTER | Age: 64
End: 2024-12-23

## 2024-12-31 PROBLEM — C34.90 MALIGNANT NEOPLASM OF UNSPECIFIED PART OF UNSPECIFIED BRONCHUS OR LUNG: Chronic | Status: ACTIVE | Noted: 2024-12-08

## 2025-01-07 ENCOUNTER — APPOINTMENT (OUTPATIENT)
Dept: THORACIC SURGERY | Facility: CLINIC | Age: 65
End: 2025-01-07
Payer: COMMERCIAL

## 2025-01-07 DIAGNOSIS — C34.90 MALIGNANT NEOPLASM OF UNSPECIFIED PART OF UNSPECIFIED BRONCHUS OR LUNG: ICD-10-CM

## 2025-01-07 PROCEDURE — 99212 OFFICE O/P EST SF 10 MIN: CPT

## 2025-01-11 LAB
CULTURE RESULTS: SIGNIFICANT CHANGE UP
SPECIMEN SOURCE: SIGNIFICANT CHANGE UP

## 2025-01-21 ENCOUNTER — APPOINTMENT (OUTPATIENT)
Dept: INFECTIOUS DISEASE | Facility: CLINIC | Age: 65
End: 2025-01-21
Payer: MEDICARE

## 2025-01-21 VITALS
BODY MASS INDEX: 29.59 KG/M2 | HEART RATE: 88 BPM | HEIGHT: 63 IN | TEMPERATURE: 98.2 F | WEIGHT: 167 LBS | DIASTOLIC BLOOD PRESSURE: 73 MMHG | OXYGEN SATURATION: 95 % | SYSTOLIC BLOOD PRESSURE: 115 MMHG

## 2025-01-21 DIAGNOSIS — G06.2 EXTRADURAL AND SUBDURAL ABSCESS, UNSPECIFIED: ICD-10-CM

## 2025-01-21 DIAGNOSIS — M46.26 OSTEOMYELITIS OF VERTEBRA, LUMBAR REGION: ICD-10-CM

## 2025-01-21 PROCEDURE — 99214 OFFICE O/P EST MOD 30 MIN: CPT

## 2025-02-21 ENCOUNTER — APPOINTMENT (OUTPATIENT)
Dept: ORTHOPEDIC SURGERY | Facility: CLINIC | Age: 65
End: 2025-02-21
Payer: MEDICARE

## 2025-02-21 DIAGNOSIS — M48.07 SPINAL STENOSIS, LUMBOSACRAL REGION: ICD-10-CM

## 2025-02-21 PROCEDURE — 99214 OFFICE O/P EST MOD 30 MIN: CPT

## 2025-05-06 ENCOUNTER — NON-APPOINTMENT (OUTPATIENT)
Age: 65
End: 2025-05-06

## 2025-05-06 ENCOUNTER — APPOINTMENT (OUTPATIENT)
Dept: INFECTIOUS DISEASE | Facility: CLINIC | Age: 65
End: 2025-05-06
Payer: MEDICARE

## 2025-05-06 VITALS
OXYGEN SATURATION: 96 % | SYSTOLIC BLOOD PRESSURE: 137 MMHG | DIASTOLIC BLOOD PRESSURE: 80 MMHG | HEIGHT: 63 IN | WEIGHT: 165 LBS | TEMPERATURE: 98.3 F | BODY MASS INDEX: 29.23 KG/M2 | HEART RATE: 78 BPM

## 2025-05-06 DIAGNOSIS — M46.26 OSTEOMYELITIS OF VERTEBRA, LUMBAR REGION: ICD-10-CM

## 2025-05-06 LAB
ANION GAP SERPL CALC-SCNC: 11 MMOL/L
BASOPHILS # BLD AUTO: 0.06 K/UL
BASOPHILS NFR BLD AUTO: 0.9 %
BUN SERPL-MCNC: 15 MG/DL
CALCIUM SERPL-MCNC: 9.7 MG/DL
CHLORIDE SERPL-SCNC: 102 MMOL/L
CO2 SERPL-SCNC: 26 MMOL/L
CREAT SERPL-MCNC: 1.05 MG/DL
CRP SERPL-MCNC: 3 MG/L
EGFRCR SERPLBLD CKD-EPI 2021: 59 ML/MIN/1.73M2
EOSINOPHIL # BLD AUTO: 0.21 K/UL
EOSINOPHIL NFR BLD AUTO: 3.1 %
ERYTHROCYTE [SEDIMENTATION RATE] IN BLOOD BY WESTERGREN METHOD: 6 MM/HR
GLUCOSE SERPL-MCNC: 73 MG/DL
HCT VFR BLD CALC: 45.6 %
HGB BLD-MCNC: 14.7 G/DL
IMM GRANULOCYTES NFR BLD AUTO: 0.6 %
LYMPHOCYTES # BLD AUTO: 1.36 K/UL
LYMPHOCYTES NFR BLD AUTO: 20 %
MAN DIFF?: NORMAL
MCHC RBC-ENTMCNC: 31 PG
MCHC RBC-ENTMCNC: 32.2 G/DL
MCV RBC AUTO: 96.2 FL
MONOCYTES # BLD AUTO: 0.75 K/UL
MONOCYTES NFR BLD AUTO: 11 %
NEUTROPHILS # BLD AUTO: 4.37 K/UL
NEUTROPHILS NFR BLD AUTO: 64.4 %
PLATELET # BLD AUTO: 324 K/UL
POTASSIUM SERPL-SCNC: 4.9 MMOL/L
RBC # BLD: 4.74 M/UL
RBC # FLD: 14.9 %
SODIUM SERPL-SCNC: 140 MMOL/L
WBC # FLD AUTO: 6.79 K/UL

## 2025-05-06 PROCEDURE — 99214 OFFICE O/P EST MOD 30 MIN: CPT

## 2025-05-23 ENCOUNTER — RESULT REVIEW (OUTPATIENT)
Age: 65
End: 2025-05-23

## 2025-05-23 ENCOUNTER — OUTPATIENT (OUTPATIENT)
Dept: OUTPATIENT SERVICES | Facility: HOSPITAL | Age: 65
LOS: 1 days | End: 2025-05-23
Payer: MEDICARE

## 2025-05-23 ENCOUNTER — APPOINTMENT (OUTPATIENT)
Dept: MRI IMAGING | Facility: IMAGING CENTER | Age: 65
End: 2025-05-23
Payer: MEDICARE

## 2025-05-23 DIAGNOSIS — Z90.89 ACQUIRED ABSENCE OF OTHER ORGANS: Chronic | ICD-10-CM

## 2025-05-23 DIAGNOSIS — M46.26 OSTEOMYELITIS OF VERTEBRA, LUMBAR REGION: ICD-10-CM

## 2025-05-23 DIAGNOSIS — Z98.890 OTHER SPECIFIED POSTPROCEDURAL STATES: Chronic | ICD-10-CM

## 2025-05-23 PROCEDURE — 74018 RADEX ABDOMEN 1 VIEW: CPT

## 2025-05-23 PROCEDURE — 72158 MRI LUMBAR SPINE W/O & W/DYE: CPT

## 2025-05-23 PROCEDURE — A9585: CPT

## 2025-05-23 PROCEDURE — 71045 X-RAY EXAM CHEST 1 VIEW: CPT

## 2025-05-23 PROCEDURE — 71045 X-RAY EXAM CHEST 1 VIEW: CPT | Mod: 26

## 2025-05-23 PROCEDURE — 74018 RADEX ABDOMEN 1 VIEW: CPT | Mod: 26

## 2025-05-23 PROCEDURE — 72158 MRI LUMBAR SPINE W/O & W/DYE: CPT | Mod: 26

## 2025-06-02 ENCOUNTER — APPOINTMENT (OUTPATIENT)
Dept: ORTHOPEDIC SURGERY | Facility: CLINIC | Age: 65
End: 2025-06-02

## 2025-06-20 ENCOUNTER — APPOINTMENT (OUTPATIENT)
Dept: ORTHOPEDIC SURGERY | Facility: CLINIC | Age: 65
End: 2025-06-20
Payer: MEDICARE

## 2025-06-20 VITALS — WEIGHT: 170 LBS | BODY MASS INDEX: 30.12 KG/M2 | HEIGHT: 63 IN

## 2025-06-20 PROCEDURE — 99214 OFFICE O/P EST MOD 30 MIN: CPT

## 2025-06-27 ENCOUNTER — OUTPATIENT (OUTPATIENT)
Dept: OUTPATIENT SERVICES | Facility: HOSPITAL | Age: 65
LOS: 1 days | End: 2025-06-27
Payer: MEDICARE

## 2025-06-27 ENCOUNTER — APPOINTMENT (OUTPATIENT)
Dept: CT IMAGING | Facility: IMAGING CENTER | Age: 65
End: 2025-06-27

## 2025-06-27 DIAGNOSIS — Z98.890 OTHER SPECIFIED POSTPROCEDURAL STATES: Chronic | ICD-10-CM

## 2025-06-27 DIAGNOSIS — Z90.89 ACQUIRED ABSENCE OF OTHER ORGANS: Chronic | ICD-10-CM

## 2025-06-27 DIAGNOSIS — C34.90 MALIGNANT NEOPLASM OF UNSPECIFIED PART OF UNSPECIFIED BRONCHUS OR LUNG: ICD-10-CM

## 2025-06-27 PROCEDURE — 71250 CT THORAX DX C-: CPT

## 2025-06-27 PROCEDURE — 71250 CT THORAX DX C-: CPT | Mod: 26

## 2025-07-08 ENCOUNTER — APPOINTMENT (OUTPATIENT)
Dept: THORACIC SURGERY | Facility: CLINIC | Age: 65
End: 2025-07-08
Payer: MEDICARE

## 2025-07-08 VITALS
HEIGHT: 63 IN | DIASTOLIC BLOOD PRESSURE: 72 MMHG | SYSTOLIC BLOOD PRESSURE: 113 MMHG | OXYGEN SATURATION: 96 % | HEART RATE: 85 BPM | WEIGHT: 175 LBS | BODY MASS INDEX: 31.01 KG/M2

## 2025-07-08 PROCEDURE — 99204 OFFICE O/P NEW MOD 45 MIN: CPT

## 2025-08-12 ENCOUNTER — NON-APPOINTMENT (OUTPATIENT)
Age: 65
End: 2025-08-12